# Patient Record
Sex: FEMALE | Race: WHITE | Employment: OTHER | URBAN - METROPOLITAN AREA
[De-identification: names, ages, dates, MRNs, and addresses within clinical notes are randomized per-mention and may not be internally consistent; named-entity substitution may affect disease eponyms.]

---

## 2017-01-02 DIAGNOSIS — I10 ESSENTIAL HYPERTENSION WITH GOAL BLOOD PRESSURE LESS THAN 130/80: ICD-10-CM

## 2017-01-03 RX ORDER — AMLODIPINE BESYLATE 5 MG/1
TABLET ORAL
Qty: 90 TAB | Refills: 0 | Status: SHIPPED | OUTPATIENT
Start: 2017-01-03 | End: 2017-04-01 | Stop reason: SDUPTHER

## 2017-03-14 ENCOUNTER — OFFICE VISIT (OUTPATIENT)
Dept: INTERNAL MEDICINE CLINIC | Age: 67
End: 2017-03-14

## 2017-03-14 VITALS
HEIGHT: 61 IN | TEMPERATURE: 98.4 F | DIASTOLIC BLOOD PRESSURE: 75 MMHG | OXYGEN SATURATION: 98 % | HEART RATE: 87 BPM | WEIGHT: 187 LBS | BODY MASS INDEX: 35.3 KG/M2 | RESPIRATION RATE: 18 BRPM | SYSTOLIC BLOOD PRESSURE: 149 MMHG

## 2017-03-14 DIAGNOSIS — R05.9 COUGH: ICD-10-CM

## 2017-03-14 DIAGNOSIS — M45.9 ANKYLOSING SPONDYLITIS (HCC): ICD-10-CM

## 2017-03-14 DIAGNOSIS — I10 ESSENTIAL HYPERTENSION, BENIGN: ICD-10-CM

## 2017-03-14 DIAGNOSIS — M17.11 PRIMARY OSTEOARTHRITIS OF RIGHT KNEE: Primary | ICD-10-CM

## 2017-03-14 DIAGNOSIS — K21.9 GASTROESOPHAGEAL REFLUX DISEASE, ESOPHAGITIS PRESENCE NOT SPECIFIED: ICD-10-CM

## 2017-03-14 DIAGNOSIS — Z01.818 PREOP GENERAL PHYSICAL EXAM: ICD-10-CM

## 2017-03-14 DIAGNOSIS — E11.9 CONTROLLED TYPE 2 DIABETES MELLITUS WITHOUT COMPLICATION, WITHOUT LONG-TERM CURRENT USE OF INSULIN (HCC): ICD-10-CM

## 2017-03-14 RX ORDER — RANITIDINE 150 MG/1
150 TABLET, FILM COATED ORAL 2 TIMES DAILY
Qty: 30 TAB | Refills: 0
Start: 2017-03-14 | End: 2020-04-07 | Stop reason: ALTCHOICE

## 2017-03-14 RX ORDER — BENZONATATE 200 MG/1
200 CAPSULE ORAL
Qty: 30 CAP | Refills: 0 | Status: SHIPPED | OUTPATIENT
Start: 2017-03-14 | End: 2017-03-21

## 2017-03-14 NOTE — PROGRESS NOTES
Have you been to the ER or urgent care clinic since your last visit? No     Have you been hospitalized since your last visit? No     Have you been seen or consulted any other health care provider outside of [address] [place] [address] since your last visit (including pap smears, colonoscopy screening)?   No

## 2017-03-14 NOTE — PROGRESS NOTES
HISTORY OF PRESENT ILLNESS  Reilly So is a 77 y.o. female. HPI  Reilly So was referred for evaluation by: Dr. Angelia Sandoval for Pre- Op Evaluation. Please see encounter details and orders for consultative summary. Type of surgery : Right total knee replacement  Surgery site : Right knee  Anesthesia type: Spinal with IV sedation  Date of procedure:  4/11/2017    Allergies: Allergies   Allergen Reactions    Latex Swelling    Benadryl [Diphenhydramine Hcl] Unknown (comments)     Raises heart rate    Atropine Swelling     Eye swelling    Compazine [Prochlorperazine Edisylate] Other (comments)     Muscle spasm    Cymbalta [Duloxetine] Vertigo    Diclofenac Sodium (Bulk) Rash    Phenobarbital Other (comments)     tachy     Latex allergy: yes  Prior reactions to anesthesia:  Has had early awakening with her cataract surgeries and Hysterectomy. Functional status:  she is able to ambulate up 1 flights of step without shortness of breath, chest pain  Prior cardiac evaluation:   Had cardiac workup for SOB 1/2016 with negative Lexiscan with EF of 65%. Has been taking Tums on a daily basis and has increased her usage up to 3 times per day which seems to keep her heartburn under control. Does not wish to take PPI due to side effect profile. Has history of Ankylosing spondylitis and is currently receiving Remicade infusions and takes Sulfasalazine. Her Iritis has been stable and she is not currently using steroid eye drops. Has started coughing over the past 2-3 days and has needed to use her Albuterol inhaler several times but denies purulent mucous or productive cough; denies fever, chills. Current Outpatient Prescriptions   Medication Sig    raNITIdine (ZANTAC) 150 mg tablet Take 1 Tab by mouth two (2) times a day.  benzonatate (TESSALON) 200 mg capsule Take 1 Cap by mouth three (3) times daily as needed for Cough for up to 7 days.     amLODIPine (NORVASC) 5 mg tablet TAKE 1 TABLET BY MOUTH DAILY    atorvastatin (LIPITOR) 20 mg tablet TAKE 1 TABLET BY MOUTH EVERY EVENING    metFORMIN ER (GLUCOPHAGE XR) 500 mg tablet TAKE 1 TABLET BY MOUTH EVERY DAY WITH DINNER    olmesartan-hydrochlorothiazide (BENICAR HCT) 40-25 mg per tablet Take 1 Tab by mouth daily.  montelukast (SINGULAIR) 10 mg tablet TAKE 1 TABLET BY MOUTH EVERY DAY    HYDROcodone-acetaminophen (NORCO) 5-325 mg per tablet Take 1 Tab by mouth every eight (8) hours as needed.  sulfaSALAzine (AZULFIDINE) 500 mg tablet Take 1,000 mg by mouth two (2) times a day.  albuterol (PROVENTIL HFA, VENTOLIN HFA, PROAIR HFA) 90 mcg/actuation inhaler Take 1 Puff by inhalation every six (6) hours as needed.  fluticasone (FLONASE) 50 mcg/actuation nasal spray 2 sprays by Both Nostrils route daily.  INFLIXIMAB (REMICADE IV) by IntraVENous route. Every 60 days    albuterol (PROVENTIL HFA, VENTOLIN HFA, PROAIR HFA) 90 mcg/actuation inhaler Take 1 Puff by inhalation every six (6) hours as needed for Wheezing for up to 30 days.  folic acid (FOLVITE) 1 mg tablet Take 2 mg by mouth daily. No current facility-administered medications for this visit.       Past Medical History:   Diagnosis Date    Ankylosing spondylitis ([facility] 2/8/2012    Autoimmune disease ([facility]     ankylosing spondylitis    Diabetes ([facility]     Essential hypertension, benign     First degree atrioventricular block     Hypercholesteremia     Iritis 1/15/2009    S/P partial hysterectomy     Sacroiliitis ([facility] 9/15/2010    Spinal stenosis, lumbar 5/29/14    mri tamiko at l4-l5    TMJ (dislocation of temporomandibular joint) 9/4/2009    Unspecified adverse effect of anesthesia     woke up durning  cataract and hysterectomy      Past Surgical History:   Procedure Laterality Date    ENDOSCOPY, COLON, DIAGNOSTIC  1/01/07    negative     HX CATARACT REMOVAL  1992    bilateral  / lens implant    HX GYN  9/28/13    bilat oophorectomy    HX HYSTERECTOMY  2008    HX KNEE ARTHROSCOPY  2003    right    HX ORTHOPAEDIC  8/6/15    LUMBAR LAMINECTOMY L3-4 dr Alissa Rogel     Social History   Substance Use Topics    Smoking status: Never Smoker    Smokeless tobacco: Never Used    Alcohol use Yes      Comment: occassional     Denies diagnosis of sleep apnea and does not use any CPAP device. Denies history of DVT or Pulmonary embolism; denies bleeding disorder. Blood pressure 149/75, pulse 87, temperature 98.4 °F (36.9 °C), temperature source Oral, resp. rate 18, height 5' 1\" (1.549 m), weight 187 lb (84.8 kg), SpO2 98 %. Review of Systems   Constitutional: Negative for chills, fever and malaise/fatigue. HENT: Positive for congestion. Negative for sore throat. Eyes: Positive for blurred vision. Respiratory: Positive for cough and wheezing. Negative for sputum production and shortness of breath. Cardiovascular: Negative for chest pain, palpitations and leg swelling. Gastrointestinal: Negative for abdominal pain, constipation, diarrhea, nausea and vomiting. Genitourinary: Negative for dysuria and frequency. Musculoskeletal: Positive for back pain and joint pain (right knee pain). Skin: Negative for rash. Neurological: Negative for dizziness, tingling and headaches. Endo/Heme/Allergies: Does not bruise/bleed easily. Psychiatric/Behavioral: Negative for depression. The patient is not nervous/anxious. Physical Exam   Constitutional: She is oriented to person, place, and time. She appears well-developed and well-nourished. HENT:   Head: Normocephalic and atraumatic. Right Ear: External ear normal.   Left Ear: External ear normal.   Nose: Nose normal.   Mouth/Throat: Oropharynx is clear and moist.   Eyes: Conjunctivae are normal.   Neck: Normal range of motion. Neck supple. No thyromegaly present. Cardiovascular: Normal rate, regular rhythm and normal heart sounds. Pulmonary/Chest: Effort normal. She has wheezes.  She has no rales.   Abdominal: Soft. Bowel sounds are normal. There is no tenderness. There is no rebound. Musculoskeletal:        Right knee: She exhibits decreased range of motion. Tenderness found. Medial joint line and lateral joint line tenderness noted. Lymphadenopathy:     She has no cervical adenopathy. Neurological: She is alert and oriented to person, place, and time. Skin: Skin is warm and dry. No rash noted. Psychiatric: She has a normal mood and affect. Her behavior is normal.   Nursing note and vitals reviewed. ASSESSMENT and Carly Bui was seen today for pre-op exam and wheezing. Diagnoses and all orders for this visit:    Primary osteoarthritis of right knee    Preop general physical exam -- she is scheduled for labs and EKG with Preadmission testing department at [facility] prior to surgery; provided all normal, she is considered acceptable risk for upcoming Right total knee replacement surgery with Spinal anesthesia and IV sedation. Ankylosing spondylitis ([facility] -- she has discussed stopping her infusions prior to knee replacement surgery with her Rheumatologist.    Gastroesophageal reflux disease, esophagitis presence not specified  -     raNITIdine (ZANTAC) 150 mg tablet; Take 1 Tab by mouth two (2) times a day. Essential hypertension, benign --stable    Cough -- no signs of bacterial infection noted at this time; advised to contact office if cough worsens or she develops purulent mucous  -     benzonatate (TESSALON) 200 mg capsule; Take 1 Cap by mouth three (3) times daily as needed for Cough for up to 7 days.     Diabetes Type 2, controlled -- has been stable on Metformin    lab results and schedule of future lab studies reviewed with patient  reviewed diet, exercise and weight control  reviewed medications and side effects in detail

## 2017-03-14 NOTE — MR AVS SNAPSHOT
Visit Information Date & Time Provider Department Dept. Phone Encounter #  
 3/14/2017 10:00 AM Cris Hernández NP [facility] [address] [id number] Upcoming Health Maintenance Date Due DTaP/Tdap/Td series (1 - Tdap) 12/20/1971 OSTEOPOROSIS SCREENING (DEXA) 12/20/2015 MEDICARE YEARLY EXAM 12/20/2015 BREAST CANCER SCRN MAMMOGRAM 6/4/2017 HEMOGLOBIN A1C Q6M 5/9/2017 FOOT EXAM Q1 5/25/2017 MICROALBUMIN Q1 8/23/2017 LIPID PANEL Q1 11/9/2017 EYE EXAM RETINAL OR DILATED Q1 2/7/2018 GLAUCOMA SCREENING Q2Y 2/7/2019 Pneumococcal 65+ Low/Medium Risk (2 of 2 - PPSV23) 8/6/2019 COLONOSCOPY 11/6/2019 Allergies as of 3/14/2017  Review Complete On: 3/14/2017 By: Cris Hernández NP Severity Noted Reaction Type Reactions Latex  01/15/2009    Swelling Benadryl [Diphenhydramine Hcl] Medium 03/07/2011   Systemic Unknown (comments) Raises heart rate Atropine  01/15/2009    Swelling Eye swelling Compazine [Prochlorperazine Edisylate]  01/15/2009    Other (comments) Muscle spasm Cymbalta [Duloxetine]  03/14/2017    Vertigo Diclofenac Sodium (Bulk)  11/13/2012    Rash Phenobarbital  01/15/2009    Other (comments)  
 tachy Current Immunizations  Reviewed on 3/14/2017 Name Date Influenza High Dose Vaccine PF 10/6/2016 Pneumococcal Conjugate (PCV-13) 5/25/2016 Pneumococcal Polysaccharide (PPSV-23) 8/6/2014 Pneumococcal Vaccine (Unspecified Type) 2/10/2011 10:59 AM  
 Varicella Virus Vaccine Live 2/1/2011 Zoster Vaccine, Live 1/1/2011 Reviewed by Cris Hernández NP on 3/14/2017 at 10:32 AM  
You Were Diagnosed With   
  
 Codes Comments Primary osteoarthritis of right knee    -  Primary ICD-10-CM: M17.11 ICD-9-CM: 715.16 Preop general physical exam     ICD-10-CM: U82.326 ICD-9-CM: V72.83 Ankylosing spondylitis (HCC)     ICD-10-CM: M45.9 ICD-9-CM: 720.0 Gastroesophageal reflux disease, esophagitis presence not specified     ICD-10-CM: K21.9 ICD-9-CM: 530.81 Essential hypertension, benign     ICD-10-CM: I10 
ICD-9-CM: 401.1 Cough     ICD-10-CM: R05 ICD-9-CM: 523. 2 Vitals BP Pulse Temp Resp Height(growth percentile) Weight(growth percentile) 149/75 (BP 1 Location: Left arm, BP Patient Position: Sitting) 87 98.4 °F (36.9 °C) (Oral) 18 5' 1\" (1.549 m) 187 lb (84.8 kg) SpO2 BMI OB Status Smoking Status 98% 35.33 kg/m2 Hysterectomy Never Smoker BMI and BSA Data Body Mass Index Body Surface Area  
 35.33 kg/m 2 1.91 m 2 Preferred Pharmacy Pharmacy Name Phone [facility] [address] [facility], [address] [place] [address] AT [address] [place] [address], [facility] [phone] Your Updated Medication List  
  
   
This list is accurate as of: 3/14/17 10:42 AM.  Always use your most recent med list.  
  
  
  
  
 * albuterol 90 mcg/actuation inhaler Commonly known as:  PROVENTIL HFA, VENTOLIN HFA, PROAIR HFA Take 1 Puff by inhalation every six (6) hours as needed. * albuterol 90 mcg/actuation inhaler Commonly known as:  PROVENTIL HFA, VENTOLIN HFA, PROAIR HFA Take 1 Puff by inhalation every six (6) hours as needed for Wheezing for up to 30 days. amLODIPine 5 mg tablet Commonly known as:  Mao Lasha TAKE 1 TABLET BY MOUTH DAILY  
  
 atorvastatin 20 mg tablet Commonly known as:  LIPITOR  
TAKE 1 TABLET BY MOUTH EVERY EVENING  
  
 benzonatate 200 mg capsule Commonly known as:  TESSALON Take 1 Cap by mouth three (3) times daily as needed for Cough for up to 7 days. fluticasone 50 mcg/actuation nasal spray Commonly known as:  FLONASE  
2 sprays by Both Nostrils route daily. folic acid 1 mg tablet Commonly known as:  Google Take 2 mg by mouth daily. HYDROcodone-acetaminophen 5-325 mg per tablet Commonly known as:  Lawrence Christine  
 Take 1 Tab by mouth every eight (8) hours as needed. metFORMIN  mg tablet Commonly known as:  GLUCOPHAGE XR  
TAKE 1 TABLET BY MOUTH EVERY DAY WITH DINNER  
  
 montelukast 10 mg tablet Commonly known as:  SINGULAIR  
TAKE 1 TABLET BY MOUTH EVERY DAY  
  
 olmesartan-hydroCHLOROthiazide 40-25 mg per tablet Commonly known as:  BENICAR HCT Take 1 Tab by mouth daily. raNITIdine 150 mg tablet Commonly known as:  ZANTAC Take 1 Tab by mouth two (2) times a day. REMICADE IV  
by IntraVENous route. Every 60 days  
  
 sulfaSALAzine 500 mg tablet Commonly known as:  AZULFIDINE Take 1,000 mg by mouth two (2) times a day. * Notice: This list has 2 medication(s) that are the same as other medications prescribed for you. Read the directions carefully, and ask your doctor or other care provider to review them with you. Prescriptions Sent to Pharmacy Refills  
 benzonatate (TESSALON) 200 mg capsule 0 Sig: Take 1 Cap by mouth three (3) times daily as needed for Cough for up to 7 days. Class: Normal  
 [facility] [address] [place] [address], [address] [place] [address] of [facility] Ph #: [phone] Route: Oral  
  
To-Do List   
 03/27/2017 8:00 AM  
  Appointment with [facility] PAT EXAM RM 3 at [place] [address] ([phone])  
  
 03/27/2017 9:30 AM  
  Appointment with [facility] PAT CLASSROOM at [place] [address] ([phone]) Patient Instructions Gastroesophageal Reflux Disease (GERD): Care Instructions Your Care Instructions Gastroesophageal reflux disease (GERD) is the backward flow of stomach acid into the esophagus. The esophagus is the tube that leads from your throat to your stomach. A one-way valve prevents the stomach acid from moving up into this tube. When you have GERD, this valve does not close tightly enough.  
If you have mild GERD symptoms including heartburn, you may be able to control the problem with antacids or over-the-counter medicine. Changing your diet, losing weight, and making other lifestyle changes can also help reduce symptoms. Follow-up care is a key part of your treatment and safety. Be sure to make and go to all appointments, and call your doctor if you are having problems. Its also a good idea to know your test results and keep a list of the medicines you take. How can you care for yourself at home? · Take your medicines exactly as prescribed. Call your doctor if you think you are having a problem with your medicine. · Your doctor may recommend over-the-counter medicine. For mild or occasional indigestion, antacids, such as Tums, Gaviscon, Mylanta, or Maalox, may help. Your doctor also may recommend over-the-counter acid reducers, such as Pepcid AC, Tagamet HB, Zantac 75, or Prilosec. Read and follow all instructions on the label. If you use these medicines often, talk with your doctor. · Change your eating habits. ¨ Its best to eat several small meals instead of two or three large meals. ¨ After you eat, wait 2 to 3 hours before you lie down. ¨ Chocolate, mint, and alcohol can make GERD worse. ¨ Spicy foods, foods that have a lot of acid (like tomatoes and oranges), and coffee can make GERD symptoms worse in some people. If your symptoms are worse after you eat a certain food, you may want to stop eating that food to see if your symptoms get better. · Do not smoke or chew tobacco. Smoking can make GERD worse. If you need help quitting, talk to your doctor about stop-smoking programs and medicines. These can increase your chances of quitting for good. · If you have GERD symptoms at night, raise the head of your bed 6 to 8 inches by putting the frame on blocks or placing a foam wedge under the head of your mattress. (Adding extra pillows does not work.) · Do not wear tight clothing around your middle. · Lose weight if you need to. Losing just 5 to 10 pounds can help. When should you call for help? Call your doctor now or seek immediate medical care if: 
· You have new or different belly pain. · Your stools are black and tarlike or have streaks of blood. Watch closely for changes in your health, and be sure to contact your doctor if: 
· Your symptoms have not improved after 2 days. · Food seems to catch in your throat or chest. 
Where can you learn more? Go to http://zara-nelly.info/. Enter Y382 in the search box to learn more about \"Gastroesophageal Reflux Disease (GERD): Care Instructions. \" Current as of: August 9, 2016 Content Version: 11.1 © 7603-8284 [employer]. Care instructions adapted under license by [employer] video (which disclaims liability or warranty for this information). If you have questions about a medical condition or this instruction, always ask your healthcare professional. [place] [address] any warranty or liability for your use of this information. Total Knee Replacement: Before Your Surgery What is a total knee replacement? A total knee replacement replaces the worn ends of the bones where they meet at the knee. Those bones are the thighbone (femur) and the lower leg bone (tibia). Your doctor will remove the damaged bone. Then he or she will replace it with plastic and metal parts. These new parts may be attached to your bones with cement. Your doctor will make a cut down the center of your knee. This cut is called an incision. It will be about 8 to 10 inches long. Sometimes the surgery can be done with a smaller incision that is 4 to 6 inches. Both kinds of incisions leave scars that usually fade with time. You probably will be able to go home about 3 to 5 days after surgery.  If you have both knees done at the same time, you may need to be in the hospital for 1 week. If there is no one to help you at home, you may go to a rehab center. Most people go back to normal activities or work in 4 to 16 weeks. This depends on your health. It also depends on how well your knee does in your rehab program. This may take longer if you have both knees done at the same time. Follow-up care is a key part of your treatment and safety. Be sure to make and go to all appointments, and call your doctor if you are having problems. It's also a good idea to know your test results and keep a list of the medicines you take. What happens before surgery? Surgery can be stressful. This information will help you understand what you can expect. And it will help you safely prepare for surgery. Preparing for surgery · Understand exactly what surgery is planned, along with the risks, benefits, and other options. · Tell your doctors ALL the medicines, vitamins, supplements, and herbal remedies you take. Some of these can increase the risk of bleeding or interact with anesthesia. · If you take blood thinners, such as warfarin (Coumadin), clopidogrel (Plavix), or aspirin, be sure to talk to your doctor. He or she will tell you if you should stop taking these medicines before your surgery. Make sure that you understand exactly what your doctor wants you to do. · Your doctor will tell you which medicines to take or stop before your surgery. You may need to stop taking certain medicines a week or more before surgery. So talk to your doctor as soon as you can. · If you have an advance directive, let your doctor know. It may include a living will and a durable power of  for health care. Bring a copy to the hospital. If you don't have one, you may want to prepare one. It lets your doctor and loved ones know your health care wishes. Doctors advise that everyone prepare these papers before any type of surgery or procedure. · You may need to shower or bathe with a special soap the night before and the morning of your surgery. The soap contains chlorhexidine. It reduces the amount of bacteria on your skin that could cause an infection after surgery. What happens on the day of surgery? · Follow the instructions exactly about when to stop eating and drinking. If you don't, your surgery may be canceled. If your doctor told you to take your medicines on the day of surgery, take them with only a sip of water. · Take a bath or shower before you come in for your surgery. Do not apply lotions, perfumes, deodorants, or nail polish. · Do not shave the surgical site yourself. · Take off all jewelry and piercings. And take out contact lenses, if you wear them. At the hospital or surgery center · Bring a picture ID. · The area for surgery is often marked to make sure there are no errors. · You will be kept comfortable and safe by your anesthesia provider. The anesthesia may make you sleep. Or it may just numb the area being worked on. 
· You also will get antibiotics through the IV tube before surgery. This lowers the risk of an infection of the incision. · The surgery will take about 2 to 3 hours. Going home · Be sure you have someone to drive you home. Anesthesia and pain medicine make it unsafe for you to drive. · You will be given more specific instructions about recovering from your surgery. They will cover things like diet, wound care, follow-up care, driving, and getting back to your normal routine. When should you call your doctor? · You have questions or concerns. · You don't understand how to prepare for your surgery. · You become ill before the surgery (such as fever, flu, or a cold). · You need to reschedule or have changed your mind about having the surgery. Where can you learn more? Go to http://zara-nelly.info/.  
Enter J519 in the search box to learn more about \"Total Knee Replacement: Before Your Surgery. \" Current as of: May 23, 2016 Content Version: 11.1 © 1011-0459 [employer]. Care instructions adapted under license by [employer] (which disclaims liability or warranty for this information). If you have questions about a medical condition or this instruction, always ask your healthcare professional. [address] any warranty or liability for your use of this information. Introducing [facility]! Dear Livia Timmons: 
Thank you for requesting a [employer] account. Our records indicate that you already have an active [employer] account. You can access your account anytime at [email or web address] Did you know that you can access your hospital and ER discharge instructions at any time in [employer] You can also review all of your test results from your hospital stay or ER visit. Additional Information If you have questions, please visit the Frequently Asked Questions section of the [employer] website at [email or web address] Remember, [employer] is NOT to be used for urgent needs. For medical emergencies, dial 911. Now available from your iPhone and Android! Please provide this summary of care documentation to your next provider. Your primary care clinician is listed as Chava Davis. If you have any questions after today's visit, please call [phone].

## 2017-03-14 NOTE — PATIENT INSTRUCTIONS
Gastroesophageal Reflux Disease (GERD): Care Instructions  Your Care Instructions    Gastroesophageal reflux disease (GERD) is the backward flow of stomach acid into the esophagus. The esophagus is the tube that leads from your throat to your stomach. A one-way valve prevents the stomach acid from moving up into this tube. When you have GERD, this valve does not close tightly enough. If you have mild GERD symptoms including heartburn, you may be able to control the problem with antacids or over-the-counter medicine. Changing your diet, losing weight, and making other lifestyle changes can also help reduce symptoms. Follow-up care is a key part of your treatment and safety. Be sure to make and go to all appointments, and call your doctor if you are having problems. Its also a good idea to know your test results and keep a list of the medicines you take. How can you care for yourself at home? · Take your medicines exactly as prescribed. Call your doctor if you think you are having a problem with your medicine. · Your doctor may recommend over-the-counter medicine. For mild or occasional indigestion, antacids, such as Tums, Gaviscon, Mylanta, or Maalox, may help. Your doctor also may recommend over-the-counter acid reducers, such as Pepcid AC, Tagamet HB, Zantac 75, or Prilosec. Read and follow all instructions on the label. If you use these medicines often, talk with your doctor. · Change your eating habits. ¨ Its best to eat several small meals instead of two or three large meals. ¨ After you eat, wait 2 to 3 hours before you lie down. ¨ Chocolate, mint, and alcohol can make GERD worse. ¨ Spicy foods, foods that have a lot of acid (like tomatoes and oranges), and coffee can make GERD symptoms worse in some people. If your symptoms are worse after you eat a certain food, you may want to stop eating that food to see if your symptoms get better.   · Do not smoke or chew tobacco. Smoking can make GERD worse. If you need help quitting, talk to your doctor about stop-smoking programs and medicines. These can increase your chances of quitting for good. · If you have GERD symptoms at night, raise the head of your bed 6 to 8 inches by putting the frame on blocks or placing a foam wedge under the head of your mattress. (Adding extra pillows does not work.)  · Do not wear tight clothing around your middle. · Lose weight if you need to. Losing just 5 to 10 pounds can help. When should you call for help? Call your doctor now or seek immediate medical care if:  · You have new or different belly pain. · Your stools are black and tarlike or have streaks of blood. Watch closely for changes in your health, and be sure to contact your doctor if:  · Your symptoms have not improved after 2 days. · Food seems to catch in your throat or chest.  Where can you learn more? Go to http://zaraigobubblenelly.info/. Enter Y111 in the search box to learn more about \"Gastroesophageal Reflux Disease (GERD): Care Instructions. \"  Current as of: August 9, 2016  Content Version: 11.1  © 9106-1678 [employer]. Care instructions adapted under license by [employer] (which disclaims liability or warranty for this information). If you have questions about a medical condition or this instruction, always ask your healthcare professional. [address] any warranty or liability for your use of this information. Total Knee Replacement: Before Your Surgery  What is a total knee replacement? A total knee replacement replaces the worn ends of the bones where they meet at the knee. Those bones are the thighbone (femur) and the lower leg bone (tibia). Your doctor will remove the damaged bone. Then he or she will replace it with plastic and metal parts. These new parts may be attached to your bones with cement. Your doctor will make a cut down the center of your knee.  This cut is called an incision. It will be about 8 to 10 inches long. Sometimes the surgery can be done with a smaller incision that is 4 to 6 inches. Both kinds of incisions leave scars that usually fade with time. You probably will be able to go home about 3 to 5 days after surgery. If you have both knees done at the same time, you may need to be in the hospital for 1 week. If there is no one to help you at home, you may go to a rehab center. Most people go back to normal activities or work in 4 to 16 weeks. This depends on your health. It also depends on how well your knee does in your rehab program. This may take longer if you have both knees done at the same time. Follow-up care is a key part of your treatment and safety. Be sure to make and go to all appointments, and call your doctor if you are having problems. It's also a good idea to know your test results and keep a list of the medicines you take. What happens before surgery? Surgery can be stressful. This information will help you understand what you can expect. And it will help you safely prepare for surgery. Preparing for surgery  · Understand exactly what surgery is planned, along with the risks, benefits, and other options. · Tell your doctors ALL the medicines, vitamins, supplements, and herbal remedies you take. Some of these can increase the risk of bleeding or interact with anesthesia. · If you take blood thinners, such as warfarin (Coumadin), clopidogrel (Plavix), or aspirin, be sure to talk to your doctor. He or she will tell you if you should stop taking these medicines before your surgery. Make sure that you understand exactly what your doctor wants you to do. · Your doctor will tell you which medicines to take or stop before your surgery. You may need to stop taking certain medicines a week or more before surgery. So talk to your doctor as soon as you can. · If you have an advance directive, let your doctor know.  It may include a living will and a durable power of  for health care. Bring a copy to the hospital. If you don't have one, you may want to prepare one. It lets your doctor and loved ones know your health care wishes. Doctors advise that everyone prepare these papers before any type of surgery or procedure. · You may need to shower or bathe with a special soap the night before and the morning of your surgery. The soap contains chlorhexidine. It reduces the amount of bacteria on your skin that could cause an infection after surgery. What happens on the day of surgery? · Follow the instructions exactly about when to stop eating and drinking. If you don't, your surgery may be canceled. If your doctor told you to take your medicines on the day of surgery, take them with only a sip of water. · Take a bath or shower before you come in for your surgery. Do not apply lotions, perfumes, deodorants, or nail polish. · Do not shave the surgical site yourself. · Take off all jewelry and piercings. And take out contact lenses, if you wear them. At the hospital or surgery center  · Bring a picture ID. · The area for surgery is often marked to make sure there are no errors. · You will be kept comfortable and safe by your anesthesia provider. The anesthesia may make you sleep. Or it may just numb the area being worked on.  · You also will get antibiotics through the IV tube before surgery. This lowers the risk of an infection of the incision. · The surgery will take about 2 to 3 hours. Going home  · Be sure you have someone to drive you home. Anesthesia and pain medicine make it unsafe for you to drive. · You will be given more specific instructions about recovering from your surgery. They will cover things like diet, wound care, follow-up care, driving, and getting back to your normal routine. When should you call your doctor? · You have questions or concerns. · You don't understand how to prepare for your surgery.   · You become ill before the surgery (such as fever, flu, or a cold). · You need to reschedule or have changed your mind about having the surgery. Where can you learn more? Go to http://zara-nelly.info/. Enter W285 in the search box to learn more about \"Total Knee Replacement: Before Your Surgery. \"  Current as of: May 23, 2016  Content Version: 11.1  © 5237-4963 [employer]. Care instructions adapted under license by [email or web address] (which disclaims liability or warranty for this information). If you have questions about a medical condition or this instruction, always ask your healthcare professional. [address] any warranty or liability for your use of this information.

## 2017-03-27 ENCOUNTER — HOSPITAL ENCOUNTER (OUTPATIENT)
Dept: PREADMISSION TESTING | Age: 67
Discharge: HOME | End: 2017-03-27
Payer: MEDICARE

## 2017-03-27 ENCOUNTER — APPOINTMENT (OUTPATIENT)
Dept: PREADMISSION TESTING | Age: 67
End: 2017-03-27
Payer: MEDICARE

## 2017-03-27 VITALS
BODY MASS INDEX: 34.41 KG/M2 | HEIGHT: 62 IN | HEART RATE: 84 BPM | OXYGEN SATURATION: 96 % | TEMPERATURE: 98.4 F | WEIGHT: 187 LBS | SYSTOLIC BLOOD PRESSURE: 136 MMHG | DIASTOLIC BLOOD PRESSURE: 80 MMHG

## 2017-03-27 LAB
ABO + RH BLD: NORMAL
ANION GAP BLD CALC-SCNC: 7 MMOL/L (ref 5–15)
APPEARANCE UR: CLEAR
ATRIAL RATE: 83 BPM
BACTERIA URNS QL MICRO: NEGATIVE /HPF
BILIRUB UR QL: NEGATIVE
BLOOD GROUP ANTIBODIES SERPL: NORMAL
BUN SERPL-MCNC: 15 MG/DL (ref 6–20)
BUN/CREAT SERPL: 19 (ref 12–20)
CALCIUM SERPL-MCNC: 9.3 MG/DL (ref 8.5–10.1)
CALCULATED P AXIS, ECG09: 45 DEGREES
CALCULATED R AXIS, ECG10: 8 DEGREES
CALCULATED T AXIS, ECG11: 8 DEGREES
CHLORIDE SERPL-SCNC: 101 MMOL/L (ref 97–108)
CO2 SERPL-SCNC: 30 MMOL/L (ref 21–32)
COLOR UR: ABNORMAL
CREAT SERPL-MCNC: 0.78 MG/DL (ref 0.55–1.02)
DIAGNOSIS, 93000: NORMAL
EPITH CASTS URNS QL MICRO: ABNORMAL /LPF
ERYTHROCYTE [DISTWIDTH] IN BLOOD BY AUTOMATED COUNT: 12.5 % (ref 11.5–14.5)
EST. AVERAGE GLUCOSE BLD GHB EST-MCNC: 146 MG/DL
GLUCOSE SERPL-MCNC: 176 MG/DL (ref 65–100)
GLUCOSE UR STRIP.AUTO-MCNC: 250 MG/DL
HBA1C MFR BLD: 6.7 % (ref 4.2–6.3)
HCT VFR BLD AUTO: 39.7 % (ref 35–47)
HGB BLD-MCNC: 13.6 G/DL (ref 11.5–16)
HGB UR QL STRIP: NEGATIVE
HYALINE CASTS URNS QL MICRO: ABNORMAL /LPF (ref 0–5)
INR PPP: 1 (ref 0.9–1.1)
KETONES UR QL STRIP.AUTO: NEGATIVE MG/DL
LEUKOCYTE ESTERASE UR QL STRIP.AUTO: NEGATIVE
MCH RBC QN AUTO: 32 PG (ref 26–34)
MCHC RBC AUTO-ENTMCNC: 34.3 G/DL (ref 30–36.5)
MCV RBC AUTO: 93.4 FL (ref 80–99)
NITRITE UR QL STRIP.AUTO: NEGATIVE
P-R INTERVAL, ECG05: 180 MS
PH UR STRIP: 5.5 [PH] (ref 5–8)
PLATELET # BLD AUTO: 247 K/UL (ref 150–400)
POTASSIUM SERPL-SCNC: 3.7 MMOL/L (ref 3.5–5.1)
PROT UR STRIP-MCNC: NEGATIVE MG/DL
PROTHROMBIN TIME: 10.7 SEC (ref 9–11.1)
Q-T INTERVAL, ECG07: 422 MS
QRS DURATION, ECG06: 96 MS
QTC CALCULATION (BEZET), ECG08: 495 MS
RBC # BLD AUTO: 4.25 M/UL (ref 3.8–5.2)
RBC #/AREA URNS HPF: ABNORMAL /HPF (ref 0–5)
SODIUM SERPL-SCNC: 138 MMOL/L (ref 136–145)
SP GR UR REFRACTOMETRY: 1.02 (ref 1–1.03)
SPECIMEN EXP DATE BLD: NORMAL
UA: UC IF INDICATED,UAUC: ABNORMAL
UROBILINOGEN UR QL STRIP.AUTO: 0.2 EU/DL (ref 0.2–1)
VENTRICULAR RATE, ECG03: 83 BPM
WBC # BLD AUTO: 7.4 K/UL (ref 3.6–11)
WBC URNS QL MICRO: ABNORMAL /HPF (ref 0–4)

## 2017-03-27 PROCEDURE — 85610 PROTHROMBIN TIME: CPT | Performed by: ORTHOPAEDIC SURGERY

## 2017-03-27 PROCEDURE — 93005 ELECTROCARDIOGRAM TRACING: CPT

## 2017-03-27 PROCEDURE — 86900 BLOOD TYPING SEROLOGIC ABO: CPT | Performed by: ORTHOPAEDIC SURGERY

## 2017-03-27 PROCEDURE — 36415 COLL VENOUS BLD VENIPUNCTURE: CPT | Performed by: ORTHOPAEDIC SURGERY

## 2017-03-27 PROCEDURE — 85027 COMPLETE CBC AUTOMATED: CPT | Performed by: ORTHOPAEDIC SURGERY

## 2017-03-27 PROCEDURE — 83036 HEMOGLOBIN GLYCOSYLATED A1C: CPT | Performed by: ORTHOPAEDIC SURGERY

## 2017-03-27 PROCEDURE — 81001 URINALYSIS AUTO W/SCOPE: CPT | Performed by: ORTHOPAEDIC SURGERY

## 2017-03-27 PROCEDURE — 80048 BASIC METABOLIC PNL TOTAL CA: CPT | Performed by: ORTHOPAEDIC SURGERY

## 2017-03-27 RX ORDER — MELATONIN 5 MG
5 CAPSULE ORAL
COMMUNITY

## 2017-03-27 RX ORDER — PREDNISOLONE ACETATE 10 MG/ML
1 SUSPENSION/ DROPS OPHTHALMIC AS NEEDED
COMMUNITY
End: 2019-07-12

## 2017-03-27 NOTE — PERIOP NOTES
Preoperative instructions reviewed with patient. Patient given six pack of CHG wipes. Instructions to be reviewed  in class on use of CHG wipes. Patient given SSI infection sheet and hand washing tips sheets. MRSA/MSSA treatment instruction sheet given with an explanation to patient that they  will be notified if treatment instructions need to be initiated. Patient was given the opportunity to ask questions on the information provided.

## 2017-03-28 LAB
BACTERIA SPEC CULT: NORMAL
BACTERIA SPEC CULT: NORMAL
SERVICE CMNT-IMP: NORMAL

## 2017-04-01 DIAGNOSIS — I10 ESSENTIAL HYPERTENSION WITH GOAL BLOOD PRESSURE LESS THAN 130/80: ICD-10-CM

## 2017-04-02 RX ORDER — AMLODIPINE BESYLATE 5 MG/1
TABLET ORAL
Qty: 90 TAB | Refills: 0 | Status: SHIPPED | OUTPATIENT
Start: 2017-04-02 | End: 2017-05-12 | Stop reason: SDUPTHER

## 2017-04-07 RX ORDER — DEXAMETHASONE SODIUM PHOSPHATE 100 MG/10ML
10 INJECTION INTRAMUSCULAR; INTRAVENOUS ONCE
Status: CANCELLED | OUTPATIENT
Start: 2017-04-07 | End: 2017-04-07

## 2017-04-07 RX ORDER — CELECOXIB 200 MG/1
200 CAPSULE ORAL ONCE
Status: CANCELLED | OUTPATIENT
Start: 2017-04-07 | End: 2017-04-07

## 2017-04-07 RX ORDER — CEFAZOLIN SODIUM IN 0.9 % NACL 2 G/50 ML
2 INTRAVENOUS SOLUTION, PIGGYBACK (ML) INTRAVENOUS ONCE
Status: CANCELLED | OUTPATIENT
Start: 2017-04-07 | End: 2017-04-07

## 2017-04-07 RX ORDER — ACETAMINOPHEN 500 MG
1000 TABLET ORAL ONCE
Status: CANCELLED | OUTPATIENT
Start: 2017-04-07 | End: 2017-04-07

## 2017-04-07 RX ORDER — PREGABALIN 75 MG/1
75 CAPSULE ORAL ONCE
Status: CANCELLED | OUTPATIENT
Start: 2017-04-07 | End: 2017-04-07

## 2017-04-10 ENCOUNTER — ANESTHESIA EVENT (OUTPATIENT)
Dept: SURGERY | Age: 67
DRG: 470 | End: 2017-04-10
Payer: MEDICARE

## 2017-04-10 RX ORDER — METFORMIN HYDROCHLORIDE 500 MG/1
TABLET, EXTENDED RELEASE ORAL
Qty: 30 TAB | Refills: 6 | Status: SHIPPED | OUTPATIENT
Start: 2017-04-10 | End: 2017-10-28 | Stop reason: SDUPTHER

## 2017-04-11 ENCOUNTER — ANESTHESIA (OUTPATIENT)
Dept: SURGERY | Age: 67
DRG: 470 | End: 2017-04-11
Payer: MEDICARE

## 2017-04-11 ENCOUNTER — HOSPITAL ENCOUNTER (INPATIENT)
Age: 67
LOS: 2 days | Discharge: HOME HEALTH | DRG: 470 | End: 2017-04-13
Attending: ORTHOPAEDIC SURGERY | Admitting: ORTHOPAEDIC SURGERY
Payer: MEDICARE

## 2017-04-11 PROBLEM — M17.11 PRIMARY OSTEOARTHRITIS OF RIGHT KNEE: Status: ACTIVE | Noted: 2017-04-11

## 2017-04-11 LAB
ABO + RH BLD: NORMAL
BLOOD GROUP ANTIBODIES SERPL: NORMAL
GLUCOSE BLD STRIP.AUTO-MCNC: 133 MG/DL (ref 65–100)
GLUCOSE BLD STRIP.AUTO-MCNC: 149 MG/DL (ref 65–100)
GLUCOSE BLD STRIP.AUTO-MCNC: 283 MG/DL (ref 65–100)
SERVICE CMNT-IMP: ABNORMAL
SPECIMEN EXP DATE BLD: NORMAL

## 2017-04-11 PROCEDURE — 77030033067 HC SUT PDO STRATFX SPIR J&J -B: Performed by: ORTHOPAEDIC SURGERY

## 2017-04-11 PROCEDURE — 77030014077 HC TOWER MX CEM J&J -C: Performed by: ORTHOPAEDIC SURGERY

## 2017-04-11 PROCEDURE — 77030020365 HC SOL INJ SOD CL 0.9% 50ML: Performed by: ORTHOPAEDIC SURGERY

## 2017-04-11 PROCEDURE — 77030000032 HC CUF TRNQT ZIMM -B: Performed by: ORTHOPAEDIC SURGERY

## 2017-04-11 PROCEDURE — 82962 GLUCOSE BLOOD TEST: CPT

## 2017-04-11 PROCEDURE — 74011250637 HC RX REV CODE- 250/637: Performed by: ORTHOPAEDIC SURGERY

## 2017-04-11 PROCEDURE — 74011250636 HC RX REV CODE- 250/636: Performed by: ORTHOPAEDIC SURGERY

## 2017-04-11 PROCEDURE — 77030011640 HC PAD GRND REM COVD -A: Performed by: ORTHOPAEDIC SURGERY

## 2017-04-11 PROCEDURE — 77030020788: Performed by: ORTHOPAEDIC SURGERY

## 2017-04-11 PROCEDURE — 74011000250 HC RX REV CODE- 250: Performed by: ANESTHESIOLOGY

## 2017-04-11 PROCEDURE — 77030031139 HC SUT VCRL2 J&J -A: Performed by: ORTHOPAEDIC SURGERY

## 2017-04-11 PROCEDURE — 76010000172 HC OR TIME 2.5 TO 3 HR INTENSV-TIER 1: Performed by: ORTHOPAEDIC SURGERY

## 2017-04-11 PROCEDURE — 86900 BLOOD TYPING SEROLOGIC ABO: CPT | Performed by: ORTHOPAEDIC SURGERY

## 2017-04-11 PROCEDURE — C1713 ANCHOR/SCREW BN/BN,TIS/BN: HCPCS | Performed by: ORTHOPAEDIC SURGERY

## 2017-04-11 PROCEDURE — 77030007866 HC KT SPN ANES BBMI -B: Performed by: NURSE ANESTHETIST, CERTIFIED REGISTERED

## 2017-04-11 PROCEDURE — 77030012935 HC DRSG AQUACEL BMS -B: Performed by: ORTHOPAEDIC SURGERY

## 2017-04-11 PROCEDURE — 64450 NJX AA&/STRD OTHER PN/BRANCH: CPT

## 2017-04-11 PROCEDURE — 77030013079 HC BLNKT BAIR HGGR 3M -A: Performed by: NURSE ANESTHETIST, CERTIFIED REGISTERED

## 2017-04-11 PROCEDURE — 3E0T3CZ INTRODUCE REGIONAL ANESTH IN PERIPH NRV, PLEXI, PERC: ICD-10-PCS | Performed by: ANESTHESIOLOGY

## 2017-04-11 PROCEDURE — 0SRC0J9 REPLACEMENT OF RIGHT KNEE JOINT WITH SYNTHETIC SUBSTITUTE, CEMENTED, OPEN APPROACH: ICD-10-PCS | Performed by: ORTHOPAEDIC SURGERY

## 2017-04-11 PROCEDURE — 36415 COLL VENOUS BLD VENIPUNCTURE: CPT | Performed by: ORTHOPAEDIC SURGERY

## 2017-04-11 PROCEDURE — 74011000258 HC RX REV CODE- 258: Performed by: ORTHOPAEDIC SURGERY

## 2017-04-11 PROCEDURE — C9290 INJ, BUPIVACAINE LIPOSOME: HCPCS | Performed by: ORTHOPAEDIC SURGERY

## 2017-04-11 PROCEDURE — 74011250636 HC RX REV CODE- 250/636: Performed by: ANESTHESIOLOGY

## 2017-04-11 PROCEDURE — 77030003601 HC NDL NRV BLK BBMI -A

## 2017-04-11 PROCEDURE — 77030018836 HC SOL IRR NACL ICUM -A: Performed by: ORTHOPAEDIC SURGERY

## 2017-04-11 PROCEDURE — 76210000006 HC OR PH I REC 0.5 TO 1 HR: Performed by: ORTHOPAEDIC SURGERY

## 2017-04-11 PROCEDURE — 77030018846 HC SOL IRR STRL H20 ICUM -A: Performed by: ORTHOPAEDIC SURGERY

## 2017-04-11 PROCEDURE — 74011000250 HC RX REV CODE- 250

## 2017-04-11 PROCEDURE — 74011250636 HC RX REV CODE- 250/636

## 2017-04-11 PROCEDURE — 77030016547 HC BLD SAW SAG1 STRY -B: Performed by: ORTHOPAEDIC SURGERY

## 2017-04-11 PROCEDURE — 74011000258 HC RX REV CODE- 258

## 2017-04-11 PROCEDURE — 77030018822 HC SLV COMPR FT COVD -B

## 2017-04-11 PROCEDURE — 74011000250 HC RX REV CODE- 250: Performed by: ORTHOPAEDIC SURGERY

## 2017-04-11 PROCEDURE — 77030005515 HC CATH URETH FOL14 BARD -B: Performed by: ORTHOPAEDIC SURGERY

## 2017-04-11 PROCEDURE — 65270000029 HC RM PRIVATE

## 2017-04-11 PROCEDURE — 76060000036 HC ANESTHESIA 2.5 TO 3 HR: Performed by: ORTHOPAEDIC SURGERY

## 2017-04-11 PROCEDURE — 77030002933 HC SUT MCRYL J&J -A: Performed by: ORTHOPAEDIC SURGERY

## 2017-04-11 PROCEDURE — C1776 JOINT DEVICE (IMPLANTABLE): HCPCS | Performed by: ORTHOPAEDIC SURGERY

## 2017-04-11 PROCEDURE — 77030010507 HC ADH SKN DERMBND J&J -B: Performed by: ORTHOPAEDIC SURGERY

## 2017-04-11 DEVICE — COMPONENT PAT DIA35MM KNEE POLY DOME CEM MEDIALIZED ATTUNE: Type: IMPLANTABLE DEVICE | Site: KNEE | Status: FUNCTIONAL

## 2017-04-11 DEVICE — BASEPLATE TIB SZ 3 KNEE CEM FIX BEAR ATTUNE: Type: IMPLANTABLE DEVICE | Site: KNEE | Status: FUNCTIONAL

## 2017-04-11 DEVICE — INSERT TIB SZ 5 THK7MM KNEE POST STBL FIX BEAR ATTUNE: Type: IMPLANTABLE DEVICE | Site: KNEE | Status: FUNCTIONAL

## 2017-04-11 DEVICE — COMPONENT FEM SZ 5 R KNEE POST STBL CEM ATTUNE: Type: IMPLANTABLE DEVICE | Site: KNEE | Status: FUNCTIONAL

## 2017-04-11 DEVICE — INSERT TIB RP FEM KNEE CEM: Type: IMPLANTABLE DEVICE | Site: KNEE | Status: FUNCTIONAL

## 2017-04-11 DEVICE — CEMENT BNE GENTAMICIN 40 GM SMARTSET GMV: Type: IMPLANTABLE DEVICE | Site: KNEE | Status: FUNCTIONAL

## 2017-04-11 RX ORDER — MIDAZOLAM HYDROCHLORIDE 1 MG/ML
1 INJECTION, SOLUTION INTRAMUSCULAR; INTRAVENOUS AS NEEDED
Status: DISCONTINUED | OUTPATIENT
Start: 2017-04-11 | End: 2017-04-11 | Stop reason: HOSPADM

## 2017-04-11 RX ORDER — NALOXONE HYDROCHLORIDE 0.4 MG/ML
0.4 INJECTION, SOLUTION INTRAMUSCULAR; INTRAVENOUS; SUBCUTANEOUS AS NEEDED
Status: DISCONTINUED | OUTPATIENT
Start: 2017-04-11 | End: 2017-04-13 | Stop reason: HOSPADM

## 2017-04-11 RX ORDER — SODIUM CHLORIDE, SODIUM LACTATE, POTASSIUM CHLORIDE, CALCIUM CHLORIDE 600; 310; 30; 20 MG/100ML; MG/100ML; MG/100ML; MG/100ML
125 INJECTION, SOLUTION INTRAVENOUS CONTINUOUS
Status: DISCONTINUED | OUTPATIENT
Start: 2017-04-11 | End: 2017-04-11 | Stop reason: HOSPADM

## 2017-04-11 RX ORDER — FLUTICASONE PROPIONATE 50 MCG
2 SPRAY, SUSPENSION (ML) NASAL DAILY
Status: DISCONTINUED | OUTPATIENT
Start: 2017-04-12 | End: 2017-04-13 | Stop reason: HOSPADM

## 2017-04-11 RX ORDER — MIDAZOLAM HYDROCHLORIDE 1 MG/ML
0.5 INJECTION, SOLUTION INTRAMUSCULAR; INTRAVENOUS
Status: DISCONTINUED | OUTPATIENT
Start: 2017-04-11 | End: 2017-04-11 | Stop reason: HOSPADM

## 2017-04-11 RX ORDER — MORPHINE SULFATE 10 MG/ML
2 INJECTION, SOLUTION INTRAMUSCULAR; INTRAVENOUS
Status: DISCONTINUED | OUTPATIENT
Start: 2017-04-11 | End: 2017-04-11 | Stop reason: HOSPADM

## 2017-04-11 RX ORDER — SODIUM CHLORIDE 0.9 % (FLUSH) 0.9 %
5-10 SYRINGE (ML) INJECTION AS NEEDED
Status: DISCONTINUED | OUTPATIENT
Start: 2017-04-11 | End: 2017-04-11 | Stop reason: HOSPADM

## 2017-04-11 RX ORDER — HYDROXYZINE HYDROCHLORIDE 10 MG/1
10 TABLET, FILM COATED ORAL
Status: DISCONTINUED | OUTPATIENT
Start: 2017-04-11 | End: 2017-04-13 | Stop reason: HOSPADM

## 2017-04-11 RX ORDER — LIDOCAINE HYDROCHLORIDE 10 MG/ML
0.1 INJECTION, SOLUTION EPIDURAL; INFILTRATION; INTRACAUDAL; PERINEURAL AS NEEDED
Status: DISCONTINUED | OUTPATIENT
Start: 2017-04-11 | End: 2017-04-11 | Stop reason: HOSPADM

## 2017-04-11 RX ORDER — ASPIRIN 325 MG
325 TABLET, DELAYED RELEASE (ENTERIC COATED) ORAL 2 TIMES DAILY
Status: DISCONTINUED | OUTPATIENT
Start: 2017-04-11 | End: 2017-04-13 | Stop reason: HOSPADM

## 2017-04-11 RX ORDER — DEXTROSE 50 % IN WATER (D50W) INTRAVENOUS SYRINGE
12.5-25 AS NEEDED
Status: DISCONTINUED | OUTPATIENT
Start: 2017-04-11 | End: 2017-04-13 | Stop reason: HOSPADM

## 2017-04-11 RX ORDER — SODIUM CHLORIDE, SODIUM LACTATE, POTASSIUM CHLORIDE, CALCIUM CHLORIDE 600; 310; 30; 20 MG/100ML; MG/100ML; MG/100ML; MG/100ML
INJECTION, SOLUTION INTRAVENOUS
Status: DISCONTINUED | OUTPATIENT
Start: 2017-04-11 | End: 2017-04-11

## 2017-04-11 RX ORDER — DEXAMETHASONE SODIUM PHOSPHATE 100 MG/10ML
10 INJECTION INTRAMUSCULAR; INTRAVENOUS ONCE
Status: DISPENSED | OUTPATIENT
Start: 2017-04-11 | End: 2017-04-12

## 2017-04-11 RX ORDER — SODIUM CHLORIDE, SODIUM LACTATE, POTASSIUM CHLORIDE, CALCIUM CHLORIDE 600; 310; 30; 20 MG/100ML; MG/100ML; MG/100ML; MG/100ML
75 INJECTION, SOLUTION INTRAVENOUS CONTINUOUS
Status: DISCONTINUED | OUTPATIENT
Start: 2017-04-11 | End: 2017-04-11 | Stop reason: HOSPADM

## 2017-04-11 RX ORDER — HYDROMORPHONE HYDROCHLORIDE 1 MG/ML
0.2 INJECTION, SOLUTION INTRAMUSCULAR; INTRAVENOUS; SUBCUTANEOUS
Status: DISCONTINUED | OUTPATIENT
Start: 2017-04-11 | End: 2017-04-11 | Stop reason: HOSPADM

## 2017-04-11 RX ORDER — HYDROCODONE BITARTRATE AND ACETAMINOPHEN 5; 325 MG/1; MG/1
1 TABLET ORAL AS NEEDED
Status: DISCONTINUED | OUTPATIENT
Start: 2017-04-11 | End: 2017-04-11 | Stop reason: HOSPADM

## 2017-04-11 RX ORDER — OXYCODONE HYDROCHLORIDE 5 MG/1
10 TABLET ORAL
Status: DISCONTINUED | OUTPATIENT
Start: 2017-04-11 | End: 2017-04-13 | Stop reason: HOSPADM

## 2017-04-11 RX ORDER — MONTELUKAST SODIUM 10 MG/1
10 TABLET ORAL
Status: DISCONTINUED | OUTPATIENT
Start: 2017-04-11 | End: 2017-04-13 | Stop reason: HOSPADM

## 2017-04-11 RX ORDER — CEFAZOLIN SODIUM IN 0.9 % NACL 2 G/50 ML
2 INTRAVENOUS SOLUTION, PIGGYBACK (ML) INTRAVENOUS EVERY 8 HOURS
Status: COMPLETED | OUTPATIENT
Start: 2017-04-11 | End: 2017-04-12

## 2017-04-11 RX ORDER — FENTANYL CITRATE 50 UG/ML
INJECTION, SOLUTION INTRAMUSCULAR; INTRAVENOUS AS NEEDED
Status: DISCONTINUED | OUTPATIENT
Start: 2017-04-11 | End: 2017-04-11 | Stop reason: HOSPADM

## 2017-04-11 RX ORDER — FENTANYL CITRATE 50 UG/ML
50 INJECTION, SOLUTION INTRAMUSCULAR; INTRAVENOUS AS NEEDED
Status: DISCONTINUED | OUTPATIENT
Start: 2017-04-11 | End: 2017-04-11 | Stop reason: HOSPADM

## 2017-04-11 RX ORDER — POLYETHYLENE GLYCOL 3350 17 G/17G
17 POWDER, FOR SOLUTION ORAL DAILY
Status: DISCONTINUED | OUTPATIENT
Start: 2017-04-12 | End: 2017-04-13 | Stop reason: HOSPADM

## 2017-04-11 RX ORDER — PREGABALIN 75 MG/1
75 CAPSULE ORAL ONCE
Status: COMPLETED | OUTPATIENT
Start: 2017-04-11 | End: 2017-04-11

## 2017-04-11 RX ORDER — MIDAZOLAM HYDROCHLORIDE 1 MG/ML
INJECTION, SOLUTION INTRAMUSCULAR; INTRAVENOUS AS NEEDED
Status: DISCONTINUED | OUTPATIENT
Start: 2017-04-11 | End: 2017-04-11 | Stop reason: HOSPADM

## 2017-04-11 RX ORDER — ONDANSETRON 2 MG/ML
4 INJECTION INTRAMUSCULAR; INTRAVENOUS
Status: ACTIVE | OUTPATIENT
Start: 2017-04-11 | End: 2017-04-12

## 2017-04-11 RX ORDER — ACETAMINOPHEN 500 MG
1000 TABLET ORAL ONCE
Status: COMPLETED | OUTPATIENT
Start: 2017-04-11 | End: 2017-04-11

## 2017-04-11 RX ORDER — DEXAMETHASONE SODIUM PHOSPHATE 10 MG/ML
10 INJECTION INTRAMUSCULAR; INTRAVENOUS ONCE
Status: COMPLETED | OUTPATIENT
Start: 2017-04-11 | End: 2017-04-11

## 2017-04-11 RX ORDER — OXYCODONE HYDROCHLORIDE 5 MG/1
5 TABLET ORAL
Status: DISCONTINUED | OUTPATIENT
Start: 2017-04-11 | End: 2017-04-13 | Stop reason: HOSPADM

## 2017-04-11 RX ORDER — ACETAMINOPHEN 500 MG
500 TABLET ORAL
Status: DISCONTINUED | OUTPATIENT
Start: 2017-04-11 | End: 2017-04-13 | Stop reason: HOSPADM

## 2017-04-11 RX ORDER — SODIUM CHLORIDE 0.9 % (FLUSH) 0.9 %
5-10 SYRINGE (ML) INJECTION EVERY 8 HOURS
Status: DISCONTINUED | OUTPATIENT
Start: 2017-04-12 | End: 2017-04-13 | Stop reason: HOSPADM

## 2017-04-11 RX ORDER — AMOXICILLIN 250 MG
1 CAPSULE ORAL 2 TIMES DAILY
Status: DISCONTINUED | OUTPATIENT
Start: 2017-04-11 | End: 2017-04-13 | Stop reason: HOSPADM

## 2017-04-11 RX ORDER — FACIAL-BODY WIPES
10 EACH TOPICAL DAILY PRN
Status: DISCONTINUED | OUTPATIENT
Start: 2017-04-13 | End: 2017-04-13 | Stop reason: HOSPADM

## 2017-04-11 RX ORDER — HYDROMORPHONE HYDROCHLORIDE 1 MG/ML
0.5 INJECTION, SOLUTION INTRAMUSCULAR; INTRAVENOUS; SUBCUTANEOUS
Status: DISPENSED | OUTPATIENT
Start: 2017-04-11 | End: 2017-04-12

## 2017-04-11 RX ORDER — SULFASALAZINE 500 MG/1
1000 TABLET ORAL 2 TIMES DAILY
Status: DISCONTINUED | OUTPATIENT
Start: 2017-04-11 | End: 2017-04-13 | Stop reason: HOSPADM

## 2017-04-11 RX ORDER — SODIUM CHLORIDE 0.9 % (FLUSH) 0.9 %
5-10 SYRINGE (ML) INJECTION AS NEEDED
Status: DISCONTINUED | OUTPATIENT
Start: 2017-04-11 | End: 2017-04-13 | Stop reason: HOSPADM

## 2017-04-11 RX ORDER — ONDANSETRON 2 MG/ML
4 INJECTION INTRAMUSCULAR; INTRAVENOUS AS NEEDED
Status: DISCONTINUED | OUTPATIENT
Start: 2017-04-11 | End: 2017-04-11 | Stop reason: HOSPADM

## 2017-04-11 RX ORDER — SODIUM CHLORIDE 9 MG/ML
50 INJECTION, SOLUTION INTRAVENOUS CONTINUOUS
Status: DISCONTINUED | OUTPATIENT
Start: 2017-04-11 | End: 2017-04-11 | Stop reason: HOSPADM

## 2017-04-11 RX ORDER — SODIUM CHLORIDE 9 MG/ML
25 INJECTION, SOLUTION INTRAVENOUS CONTINUOUS
Status: DISCONTINUED | OUTPATIENT
Start: 2017-04-11 | End: 2017-04-11 | Stop reason: HOSPADM

## 2017-04-11 RX ORDER — ATORVASTATIN CALCIUM 10 MG/1
10 TABLET, FILM COATED ORAL
Status: DISCONTINUED | OUTPATIENT
Start: 2017-04-11 | End: 2017-04-13 | Stop reason: HOSPADM

## 2017-04-11 RX ORDER — FAMOTIDINE 20 MG/1
20 TABLET, FILM COATED ORAL 2 TIMES DAILY
Status: DISCONTINUED | OUTPATIENT
Start: 2017-04-11 | End: 2017-04-13 | Stop reason: HOSPADM

## 2017-04-11 RX ORDER — CEFAZOLIN SODIUM IN 0.9 % NACL 2 G/50 ML
2 INTRAVENOUS SOLUTION, PIGGYBACK (ML) INTRAVENOUS ONCE
Status: COMPLETED | OUTPATIENT
Start: 2017-04-11 | End: 2017-04-11

## 2017-04-11 RX ORDER — FENTANYL CITRATE 50 UG/ML
25 INJECTION, SOLUTION INTRAMUSCULAR; INTRAVENOUS
Status: COMPLETED | OUTPATIENT
Start: 2017-04-11 | End: 2017-04-11

## 2017-04-11 RX ORDER — PHENYLEPHRINE HCL IN 0.9% NACL 0.4MG/10ML
SYRINGE (ML) INTRAVENOUS AS NEEDED
Status: DISCONTINUED | OUTPATIENT
Start: 2017-04-11 | End: 2017-04-11 | Stop reason: HOSPADM

## 2017-04-11 RX ORDER — ALBUTEROL SULFATE 0.83 MG/ML
2.5 SOLUTION RESPIRATORY (INHALATION)
Status: DISCONTINUED | OUTPATIENT
Start: 2017-04-11 | End: 2017-04-13 | Stop reason: HOSPADM

## 2017-04-11 RX ORDER — SODIUM CHLORIDE 0.9 % (FLUSH) 0.9 %
5-10 SYRINGE (ML) INJECTION EVERY 8 HOURS
Status: DISCONTINUED | OUTPATIENT
Start: 2017-04-11 | End: 2017-04-11 | Stop reason: HOSPADM

## 2017-04-11 RX ORDER — AMLODIPINE BESYLATE 5 MG/1
5 TABLET ORAL DAILY
Status: DISCONTINUED | OUTPATIENT
Start: 2017-04-12 | End: 2017-04-13 | Stop reason: HOSPADM

## 2017-04-11 RX ORDER — CELECOXIB 200 MG/1
200 CAPSULE ORAL ONCE
Status: COMPLETED | OUTPATIENT
Start: 2017-04-11 | End: 2017-04-11

## 2017-04-11 RX ORDER — SODIUM CHLORIDE 9 MG/ML
125 INJECTION, SOLUTION INTRAVENOUS CONTINUOUS
Status: DISPENSED | OUTPATIENT
Start: 2017-04-11 | End: 2017-04-12

## 2017-04-11 RX ORDER — PROPOFOL 10 MG/ML
INJECTION, EMULSION INTRAVENOUS
Status: DISCONTINUED | OUTPATIENT
Start: 2017-04-11 | End: 2017-04-11 | Stop reason: HOSPADM

## 2017-04-11 RX ORDER — MAGNESIUM SULFATE 100 %
4 CRYSTALS MISCELLANEOUS AS NEEDED
Status: DISCONTINUED | OUTPATIENT
Start: 2017-04-11 | End: 2017-04-13 | Stop reason: HOSPADM

## 2017-04-11 RX ADMIN — DOCUSATE SODIUM AND SENNOSIDES 1 TABLET: 8.6; 5 TABLET, FILM COATED ORAL at 21:36

## 2017-04-11 RX ADMIN — ASPIRIN 325 MG: 325 TABLET, DELAYED RELEASE ORAL at 20:00

## 2017-04-11 RX ADMIN — HYDROMORPHONE HYDROCHLORIDE 0.2 MG: 1 INJECTION, SOLUTION INTRAMUSCULAR; INTRAVENOUS; SUBCUTANEOUS at 17:45

## 2017-04-11 RX ADMIN — SODIUM CHLORIDE, SODIUM LACTATE, POTASSIUM CHLORIDE, AND CALCIUM CHLORIDE 125 ML/HR: 600; 310; 30; 20 INJECTION, SOLUTION INTRAVENOUS at 11:05

## 2017-04-11 RX ADMIN — ONDANSETRON 4 MG: 2 INJECTION INTRAMUSCULAR; INTRAVENOUS at 17:13

## 2017-04-11 RX ADMIN — MIDAZOLAM HYDROCHLORIDE 3 MG: 1 INJECTION, SOLUTION INTRAMUSCULAR; INTRAVENOUS at 12:40

## 2017-04-11 RX ADMIN — FENTANYL CITRATE 25 MCG: 50 INJECTION, SOLUTION INTRAMUSCULAR; INTRAVENOUS at 13:27

## 2017-04-11 RX ADMIN — PROPOFOL 35 MCG/KG/MIN: 10 INJECTION, EMULSION INTRAVENOUS at 13:44

## 2017-04-11 RX ADMIN — CELECOXIB 200 MG: 200 CAPSULE ORAL at 11:17

## 2017-04-11 RX ADMIN — PREGABALIN 75 MG: 75 CAPSULE ORAL at 11:17

## 2017-04-11 RX ADMIN — FAMOTIDINE 20 MG: 20 TABLET ORAL at 21:36

## 2017-04-11 RX ADMIN — HYDROMORPHONE HYDROCHLORIDE 0.2 MG: 1 INJECTION, SOLUTION INTRAMUSCULAR; INTRAVENOUS; SUBCUTANEOUS at 17:15

## 2017-04-11 RX ADMIN — MIDAZOLAM HYDROCHLORIDE 1 MG: 1 INJECTION, SOLUTION INTRAMUSCULAR; INTRAVENOUS at 13:36

## 2017-04-11 RX ADMIN — Medication 80 MCG: at 14:52

## 2017-04-11 RX ADMIN — ACETAMINOPHEN 500 MG: 500 TABLET, FILM COATED ORAL at 21:36

## 2017-04-11 RX ADMIN — FENTANYL CITRATE 25 MCG: 50 INJECTION, SOLUTION INTRAMUSCULAR; INTRAVENOUS at 13:44

## 2017-04-11 RX ADMIN — HYDROMORPHONE HYDROCHLORIDE 0.2 MG: 1 INJECTION, SOLUTION INTRAMUSCULAR; INTRAVENOUS; SUBCUTANEOUS at 17:30

## 2017-04-11 RX ADMIN — FENTANYL CITRATE 25 MCG: 50 INJECTION, SOLUTION INTRAMUSCULAR; INTRAVENOUS at 17:25

## 2017-04-11 RX ADMIN — SODIUM CHLORIDE 125 ML/HR: 900 INJECTION, SOLUTION INTRAVENOUS at 16:24

## 2017-04-11 RX ADMIN — FENTANYL CITRATE 25 MCG: 50 INJECTION, SOLUTION INTRAMUSCULAR; INTRAVENOUS at 13:37

## 2017-04-11 RX ADMIN — ATORVASTATIN CALCIUM 10 MG: 10 TABLET, FILM COATED ORAL at 21:35

## 2017-04-11 RX ADMIN — FENTANYL CITRATE 25 MCG: 50 INJECTION, SOLUTION INTRAMUSCULAR; INTRAVENOUS at 17:39

## 2017-04-11 RX ADMIN — HYDROMORPHONE HYDROCHLORIDE 0.5 MG: 1 INJECTION, SOLUTION INTRAMUSCULAR; INTRAVENOUS; SUBCUTANEOUS at 20:30

## 2017-04-11 RX ADMIN — SULFASALAZINE 1000 MG: 500 TABLET ORAL at 21:35

## 2017-04-11 RX ADMIN — SODIUM CHLORIDE, SODIUM LACTATE, POTASSIUM CHLORIDE, AND CALCIUM CHLORIDE: 600; 310; 30; 20 INJECTION, SOLUTION INTRAVENOUS at 14:30

## 2017-04-11 RX ADMIN — LIDOCAINE HYDROCHLORIDE 0.1 ML: 10 INJECTION, SOLUTION EPIDURAL; INFILTRATION; INTRACAUDAL; PERINEURAL at 11:05

## 2017-04-11 RX ADMIN — MIDAZOLAM HYDROCHLORIDE 1 MG: 1 INJECTION, SOLUTION INTRAMUSCULAR; INTRAVENOUS at 13:32

## 2017-04-11 RX ADMIN — MIDAZOLAM HYDROCHLORIDE 2 MG: 1 INJECTION, SOLUTION INTRAMUSCULAR; INTRAVENOUS at 13:27

## 2017-04-11 RX ADMIN — FENTANYL CITRATE 25 MCG: 50 INJECTION, SOLUTION INTRAMUSCULAR; INTRAVENOUS at 17:15

## 2017-04-11 RX ADMIN — FENTANYL CITRATE 25 MCG: 50 INJECTION, SOLUTION INTRAMUSCULAR; INTRAVENOUS at 17:45

## 2017-04-11 RX ADMIN — FENTANYL CITRATE 50 MCG: 50 INJECTION, SOLUTION INTRAMUSCULAR; INTRAVENOUS at 12:41

## 2017-04-11 RX ADMIN — CEFAZOLIN 2 G: 1 INJECTION, POWDER, FOR SOLUTION INTRAMUSCULAR; INTRAVENOUS; PARENTERAL at 20:30

## 2017-04-11 RX ADMIN — MIDAZOLAM HYDROCHLORIDE 1 MG: 1 INJECTION, SOLUTION INTRAMUSCULAR; INTRAVENOUS at 13:48

## 2017-04-11 RX ADMIN — Medication 80 MCG: at 14:49

## 2017-04-11 RX ADMIN — Medication 80 MCG: at 14:36

## 2017-04-11 RX ADMIN — ACETAMINOPHEN 1000 MG: 500 TABLET, FILM COATED ORAL at 11:18

## 2017-04-11 RX ADMIN — CEFAZOLIN 2 G: 1 INJECTION, POWDER, FOR SOLUTION INTRAMUSCULAR; INTRAVENOUS; PARENTERAL at 13:30

## 2017-04-11 RX ADMIN — DEXAMETHASONE SODIUM PHOSPHATE 8 MG: 10 INJECTION, SOLUTION INTRAMUSCULAR; INTRAVENOUS at 14:59

## 2017-04-11 NOTE — PROGRESS NOTES
Primary Nurse Hussein Alfred RN and Jeanine Alarcon RN performed a dual skin assessment on this patient No impairment noted  Ramón score is 20

## 2017-04-11 NOTE — IP AVS SNAPSHOT
[address] 
[phone] Patient: Gayathri Cruz MRN: [id number] You are allergic to the following Allergen Reactions Latex Swelling SWELLING TONGUE, ORAL CAVITY Benadryl (Diphenhydramine Hcl) Unknown (comments) Raises heart rate Atropine Swelling Eye swelling Chocolate (Cocoa) Rash Compazine (Prochlorperazine Edisylate) Other (comments) Muscle spasm, POSSIBLE SEIZURES Cymbalta (Duloxetine) Vertigo Diclofenac Sodium (Bulk) Rash Milk Rash Phenobarbital Other (comments)  
 tachy Recent Documentation Height Weight BMI OB Status Smoking Status 1.575 m 84.8 kg 34.19 kg/m2 Hysterectomy Never Smoker Emergency Contacts Name Discharge Info Relation Home Work Mobile [address] [place] CAREGIVER [3] Spouse [3] [phone] About your hospitalization You were admitted on:  April 11, 2017 [facility]:  April 13, 2017 Unit phone number:  [phone] Why you were hospitalized Your primary diagnosis was:  Primary Osteoarthritis Of Right Knee Your diagnoses also included:  Obesity (Bmi 30.0-34. 9) Providers Seen During Your Hospitalizations Provider Role Specialty Primary office phone Pranav Jenkins MD Attending Provider Orthopedic Surgery [phone] Your Primary Care Physician (PCP) Primary Care Physician Office Phone Office Fax Davida Gaytan [phone] Follow-up Information Follow up With Details Comments Contact Info At [facility] Health/Physical therapy [phone] Mago Coles MD   [address] 
[phone] Current Discharge Medication List  
  
 START taking these medications Dose & Instructions Dispensing Information Comments Morning Noon Evening Bedtime  
 aspirin delayed-release 325 mg tablet Your last dose was: Your next dose is:    
   
   
 Dose:  325 mg Take 1 Tab by mouth two (2) times a day. Quantity:  60 Tab Refills:  0  
     
   
   
   
  
 oxyCODONE IR 5 mg immediate release tablet Commonly known as:  Jimy Hoyt Your last dose was: Your next dose is:    
   
   
 Dose:  5-10 mg Take 1-2 Tabs by mouth every four (4) hours as needed for Pain. Max Daily Amount: 60 mg.  
 Quantity:  90 Tab Refills:  0 CONTINUE these medications which have CHANGED Dose & Instructions Dispensing Information Comments Morning Noon Evening Bedtime  
 atorvastatin 20 mg tablet Commonly known as:  LIPITOR What changed:  See the new instructions. Your last dose was: Your next dose is: TAKE 1 TABLET BY MOUTH EVERY EVENING Quantity:  30 Tab Refills:  5 CONTINUE these medications which have NOT CHANGED Dose & Instructions Dispensing Information Comments Morning Noon Evening Bedtime * albuterol 90 mcg/actuation inhaler Commonly known as:  PROVENTIL HFA, VENTOLIN HFA, PROAIR HFA Your last dose was: Your next dose is:    
   
   
 Dose:  1 Puff Take 1 Puff by inhalation every six (6) hours as needed. Refills:  0  
     
   
   
   
  
 * albuterol 90 mcg/actuation inhaler Commonly known as:  PROVENTIL HFA, VENTOLIN HFA, PROAIR HFA Your last dose was: Your next dose is:    
   
   
 Dose:  1 Puff Take 1 Puff by inhalation every six (6) hours as needed for Wheezing for up to 30 days. Quantity:  1 Inhaler Refills:  3  
     
   
   
   
  
 amLODIPine 5 mg tablet Commonly known as:  Claudell Hesselbach Your last dose was: Your next dose is: TAKE 1 TABLET BY MOUTH DAILY Quantity:  90 Tab Refills:  0  
     
   
   
   
  
 fluticasone 50 mcg/actuation nasal spray Commonly known as:  Mee Us Your last dose was: Your next dose is:    
   
   
 Dose:  2 Spray  
2 sprays by Both Nostrils route daily. Quantity:  1 Bottle Refills:  3  
     
   
   
   
  
 melatonin 5 mg Cap capsule Your last dose was: Your next dose is:    
   
   
 Dose:  5 mg Take 5 mg by mouth nightly. Refills:  0  
     
   
   
   
  
 metFORMIN  mg tablet Commonly known as:  GLUCOPHAGE XR Your last dose was: Your next dose is: TAKE 1 TABLET BY MOUTH EVERY DAY WITH DINNER Quantity:  30 Tab Refills:  6  
     
   
   
   
  
 montelukast 10 mg tablet Commonly known as:  SINGULAIR Your last dose was: Your next dose is: TAKE 1 TABLET BY MOUTH EVERY DAY Quantity:  30 Tab Refills:  11  
     
   
   
   
  
 olmesartan-hydroCHLOROthiazide 40-25 mg per tablet Commonly known as:  BENICAR HCT Your last dose was: Your next dose is:    
   
   
 Dose:  1 Tab Take 1 Tab by mouth daily. Quantity:  90 Tab Refills:  2  
     
   
   
   
  
 prednisoLONE acetate 1 % ophthalmic suspension Commonly known as:  PRED FORTE Your last dose was: Your next dose is:    
   
   
 Dose:  1 Drop Administer 1 Drop to both eyes as needed. Refills:  0  
     
   
   
   
  
 raNITIdine 150 mg tablet Commonly known as:  ZANTAC Your last dose was: Your next dose is:    
   
   
 Dose:  150 mg Take 1 Tab by mouth two (2) times a day. Quantity:  30 Tab Refills:  0  
     
   
   
   
  
 sulfaSALAzine 500 mg tablet Commonly known as:  AZULFIDINE Your last dose was: Your next dose is:    
   
   
 Dose:  1000 mg Take 1,000 mg by mouth two (2) times a day. Refills:  0 * Notice: This list has 2 medication(s) that are the same as other medications prescribed for you. Read the directions carefully, and ask your doctor or other care provider to review them with you. STOP taking these medications HYDROcodone-acetaminophen 5-325 mg per tablet Commonly known as:  Bao Washington  
   
  
 REMICADE IV Where to Get Your Medications Information on where to get these meds will be given to you by the nurse or doctor. ! Ask your nurse or doctor about these medications  
  aspirin delayed-release 325 mg tablet  
 oxyCODONE IR 5 mg immediate release tablet Discharge Instructions Patient meets criteria for BUNDLED PAYMENT  
for Care Improvement Initiative Criteria Contact Information for Orthopedic Nurse Navigator:     
ATUL Anthony, RN-BC 
J:[phone] U:[phone] Y:[phone] After Hospital Care Plan:  Discharge Instructions Knee Replacement- Dr. Huong Whitlock Patient Name: Ralph Peters Date of procedure: 4/11/2017 Procedure: Procedure(s): RIGHT TOTAL KNEE ARTHROPLASTY Surgeon: Surgeon(s) and Role: Daly Whittington MD - Primary PCP: Mihcael Ch MD 
Date of discharge: No discharge date for patient encounter. Follow up appointments ? Follow up with Dr. Huong Whitlock in 4 weeks. Call [phone] to make an appointment. ? If home health has been arranged for you the agency will contact you to arrange dates/times for visits. Please call them if you do not hear from them within 24 hours after you are discharged When to call your Orthopaedic Surgeon: Call [phone]. If you call after 5pm or on a weekend, the on call physician will be contacted ? Unrelieved pain ? Signs of infection-if your incision is red; continues to have drainage; drainage has a foul odor or if you have a persistent fever over 101 degrees ? Signs of a blood clot in your leg-calf pain, tenderness, redness, swelling of lower leg When to call your Primary Care Physician: 
? Concerns about medical conditions such as diabetes, high blood pressure, asthma, congestive heart failure ? Call if blood sugars are elevated, persistent headache or dizziness, coughing or congestion, constipation or diarrhea, burning with urination, abnormal heart rate When to call 911and go to the nearest emergency room ? Acute onset of chest pain, shortness of breath, difficulty breathing Activity ? Weight bearing as tolerated with walker or crutches. Refer to pages 23-31 of your handbook for instructions and pictures ? Complete your Home Exercise Program daily as instructed by your therapist.  Refer to pages 33-41 of your handbook for instructions and pictures ? Get up every one hour and walk (except at night when sleeping) ? Do not drive or operate heavy machinery Incision Care ? The Aquacel (brown, waterproof) surgical dressing is to remain on your knee for 7 days. On the 7th day have someone gently peel the dressing off by carefully lifting the edge and stretching it slightly to break the adhesive seal 
? If you have steri-strips (small, white pieces of tape) on your incision, they may come off when you remove the Aquacel surgical dressing. This is okay. You may now leave your incision open to air ? If your Aquacel dressing comes loose/off before the 7th day, you may replace it with a dry sterile gauze dressing; change it daily. Once you incision is not draining, you may leave it open to air ? You may take a shower with the Aquacel dressing in place. Once the Aquacel is removed, you may shower and get your incision wet but do not submerge your incision under water in a bath tub, hot tub or swimming pool for 6 weeks after surgery. Preventing blood clots ? Take Enteric coated Aspirin (delayed release) one tablet twice a day for one month following surgery Pain management ? Take pain medication as prescribed; decrease the amount you use as your pain lessens ? Avoid alcoholic beverages while taking pain medication ? Continue to take Tylenol (acetaminophen) for pain control. Please be aware that many medications contain Tylenol. We do not want you to over medicate so please read the information below as a guide. Do not take more than 3 Grams of Tylenol in a 24 hour period. (There are 1000 milligrams in one Gram) o Tylenol 325 mg per tablet (do not take more than 9 tablets in 24 hours) o Tylenol 500 mg per tablet (do not take more than 6 tablets in 24 hours) o Tylenol 650 mg per tablet (do not take more than 4 tablets in 24 hours) ? You may place an ice bag on your knee for 15-20 minutes after exercising and as needed for comfort Diet ? Resume usual diet; drink plenty of fluids; eat foods high in fiber ? You may want to take a stool softener (such as Senokot-S or Colace) to prevent constipation while you are taking pain medication. If constipation occurs, take a laxative (such as Dulcolax tablets, Milk of Magnesia, or a suppository) Home Health Care Protocol (to be followed by [address]) Nursing-per physicians order ? Complete head to toe assessment, vital signs ? Medication reconciliation ? Review pain management ? Manage chronic medical conditions Physical Therapy-per physicians order Weight bearing status: 
Precautions at Admission: Fall, WBAT Right Side Weight Bearing: As tolerated Mobility Status: 
Supine to Sit: Modified independent Sit to Stand: Stand-by asssistance Gait: 
Distance (ft): 120 Feet (ft) Ambulation - Level of Assistance: Contact guard assistance Assistive Device: Gait belt, Walker, rolling Gait Abnormalities: Antalgic, Decreased step clearance, Step to gait ADL status overall composite: 
  
  
  
  
 
Physical Therapy ?  Assessment and evaluation-bed mobility; functional transfers (bed, chair, bathroom, stairs); ambulation with equipment, car transfers, shower transfers, safety and ability to get out of house in the event of an emergency ? Review weight bearing as tolerated, wean from walker or crutches as tolerated ? Discuss pain management ? Review how to do ADLs. Refer to page 42 of patient handbook Home Exercise Program-refer to pages 33-41 of patient handbook for exercises. Discharge Orders None ACO Transitions of Care Introducing [facility] [address] offers a voluntary care coordination program to provide high quality service and care to [facility] fee-for-service beneficiaries. Guy Thompson was designed to help you enhance your health and well-being through the following services: ? Transitions of Care  support for individuals who are transitioning from one care setting to another (example: Hospital to home). ? Chronic and Complex Care Coordination  support for individuals and caregivers of those with serious or chronic illnesses or with more than one chronic (ongoing) condition and those who take a number of different medications. If you meet specific medical criteria, a [address] [place] [address] Rd may call you directly to coordinate your care with your primary care physician and your other care providers. For questions about the [facility] programs, please, contact your physicians office. For general questions or additional information about Accountable Care Organizations: 
Please visit www.medicare.gov/acos. html or call 1-800-MEDICARE (1-912.641.2129) TTY users should call 0-646.839.1058. Introducing \[facility] [place] [facility]\"" & HEALTH SERVICES! Dear Sherrell Holt: 
Thank you for requesting a [employer] account. Our records indicate that you already have an active [employer] account. You can access your account anytime at [email or web address] Did you know that you can access your hospital and ER discharge instructions at any time in [employer]? You can also review all of your test results from your hospital stay or ER visit. Additional Information If you have questions, please visit the Frequently Asked Questions section of the [employer] website at [email or web address] Remember, Home[employer] is NOT to be used for urgent needs. For medical emergencies, dial 911. Now available from your iPhone and Android! General Information Please provide this summary of care documentation to your next provider. Patient Signature:  ____________________________________________________________ Date:  ____________________________________________________________  
  
Arna Star Provider Signature:  ____________________________________________________________ Date:  ____________________________________________________________

## 2017-04-11 NOTE — ANESTHESIA POSTPROCEDURE EVALUATION
Post-Anesthesia Evaluation and Assessment    Patient: Rosibel Gilbert MRN: [id number]  SSN: [id number]    YOB: 1950  Age: 77 y.o. Sex: female       Cardiovascular Function/Vital Signs  Visit Vitals    /77    Pulse 82    Temp 36.6 °C (97.8 °F)    Resp 15    Ht 5' 2\" (1.575 m)    Wt 84.8 kg (186 lb 15.2 oz)    SpO2 100%    BMI 34.19 kg/m2       Patient is status post spinal anesthesia for Procedure(s):  RIGHT TOTAL KNEE ARTHROPLASTY . Nausea/Vomiting: None    Postoperative hydration reviewed and adequate. Pain:  Pain Scale 1: Numeric (0 - 10) (04/11/17 1605)  Pain Intensity 1: 0 (04/11/17 1605)   Managed    Neurological Status:   Neuro (WDL): Exceptions to WDL (s/p spinal BLE numb and weak) (04/11/17 1605)  Neuro  Neurologic State: Appropriate for age;Eyes open spontaneously; Eyes open to voice (04/11/17 1605)  LUE Motor Response: Purposeful (04/11/17 1605)  LLE Motor Response: Purposeful;Weak;Numbness; Pharmacologically paralyzed (04/11/17 1605)  RUE Motor Response: Purposeful (04/11/17 1605)  RLE Motor Response: Purposeful;Weak;Numbness; Pharmacologically paralyzed (04/11/17 1605)   At baseline    Mental Status and Level of Consciousness: Arousable    Pulmonary Status:   O2 Device: Nasal cannula (04/11/17 1605)   Adequate oxygenation and airway patent    Complications related to anesthesia: None    Post-anesthesia assessment completed.  No concerns    Signed By: Quiana Fam MD     April 11, 2017

## 2017-04-11 NOTE — ANESTHESIA PROCEDURE NOTES
Peripheral Block    Start time: 4/11/2017 1:05 PM  End time: 4/11/2017 1:11 PM  Performed by: An Whitehead  Authorized by: An Whitehead       Pre-procedure: Indications: at surgeon's request and post-op pain management    Preanesthetic Checklist: patient identified, risks and benefits discussed, site marked, timeout performed and patient being monitored      Block Type:   Block Type:   Adductor canal  Laterality:  Right  Monitoring:  Standard ASA monitoring, continuous pulse ox, frequent vital sign checks, heart rate, responsive to questions and oxygen  Injection Technique:  Single shot  Procedures: ultrasound guided    Patient Position: supine  Prep: DuraPrep    Needle Type:  Stimuplex  Needle Gauge:  22 G  Needle Localization:  Ultrasound guidance  Medication Injected:  0.5%  ropivacaine  Volume (mL):  20    Assessment:  Number of attempts:  1  Injection Assessment:  Incremental injection every 5 mL, local visualized surrounding nerve on ultrasound, negative aspiration for blood, no paresthesia and no intravascular symptoms  Patient tolerance:  Patient tolerated the procedure well with no immediate complications

## 2017-04-11 NOTE — PROGRESS NOTES
Bedside and Verbal shift change report given to rafael (oncoming nurse) by Alverto Alaniz (offgoing nurse). Report included the following information SBAR, Kardex, OR Summary, Procedure Summary, Intake/Output, MAR and Recent Results.

## 2017-04-11 NOTE — ANESTHESIA PROCEDURE NOTES
Spinal Block    Performed by: Buddy Cowan  Authorized by: Buddy Cowan     Pre-procedure: Indications: primary anesthetic          Assessment:      Spinal Block Procedure Note    Anesthetic procedure including post-op analgesic plan was discussed and all questions were answered. Pt. agrees to proceed. Patient was assisted to the seated position in the O.R., a \"time-out\" was performed. Monitors (ECG, NIBP & Sp02) and oxygen @ 2LPM via nasal cannula were applied. Mild sedation was administered. The back was prepped at the lumbar region with topical antiseptic and draped. 3mL 1% lidocaine was injected locally at the L3-4 interspace. A 25 G pencil point spinal needle was placed @ the above noted interspace and advanced until CSF was obtained. No blood or paresthesia was noted. Bupivacaine-MPF(hyperbaric) 12mg   was injected into the CSF. Patient tolerated the procedure well. No apparent complications were noted.

## 2017-04-11 NOTE — PROGRESS NOTES
The following herbal, alternative, and/or nutritional/dietary supplement product(s) has been discontinued  per [facility] approved policy:    Melatonin (medication name/dose/frequency)    Please reorder upon discharge if appropriate.

## 2017-04-11 NOTE — H&P (VIEW-ONLY)
HISTORY OF PRESENT ILLNESS  Katie Marte is a 77 y.o. female. HPI  Katie Marte was referred for evaluation by: Dr. Kulkarni for Pre- Op Evaluation. Please see encounter details and orders for consultative summary. Type of surgery : Right total knee replacement  Surgery site : Right knee  Anesthesia type: Spinal with IV sedation  Date of procedure:  4/11/2017    Allergies: Allergies   Allergen Reactions    Latex Swelling    Benadryl [Diphenhydramine Hcl] Unknown (comments)     Raises heart rate    Atropine Swelling     Eye swelling    Compazine [Prochlorperazine Edisylate] Other (comments)     Muscle spasm    Cymbalta [Duloxetine] Vertigo    Diclofenac Sodium (Bulk) Rash    Phenobarbital Other (comments)     tachy     Latex allergy: yes  Prior reactions to anesthesia:  Has had early awakening with her cataract surgeries and Hysterectomy. Functional status:  she is able to ambulate up 1 flights of step without shortness of breath, chest pain  Prior cardiac evaluation:   Had cardiac workup for SOB 1/2016 with negative Lexiscan with EF of 65%. Has been taking Tums on a daily basis and has increased her usage up to 3 times per day which seems to keep her heartburn under control. Does not wish to take PPI due to side effect profile. Has history of Ankylosing spondylitis and is currently receiving Remicade infusions and takes Sulfasalazine. Her Iritis has been stable and she is not currently using steroid eye drops. Has started coughing over the past 2-3 days and has needed to use her Albuterol inhaler several times but denies purulent mucous or productive cough; denies fever, chills. Current Outpatient Prescriptions   Medication Sig    raNITIdine (ZANTAC) 150 mg tablet Take 1 Tab by mouth two (2) times a day.  benzonatate (TESSALON) 200 mg capsule Take 1 Cap by mouth three (3) times daily as needed for Cough for up to 7 days.     amLODIPine (NORVASC) 5 mg tablet TAKE 1 TABLET BY MOUTH DAILY    atorvastatin (LIPITOR) 20 mg tablet TAKE 1 TABLET BY MOUTH EVERY EVENING    metFORMIN ER (GLUCOPHAGE XR) 500 mg tablet TAKE 1 TABLET BY MOUTH EVERY DAY WITH DINNER    olmesartan-hydrochlorothiazide (BENICAR HCT) 40-25 mg per tablet Take 1 Tab by mouth daily.  montelukast (SINGULAIR) 10 mg tablet TAKE 1 TABLET BY MOUTH EVERY DAY    HYDROcodone-acetaminophen (NORCO) 5-325 mg per tablet Take 1 Tab by mouth every eight (8) hours as needed.  sulfaSALAzine (AZULFIDINE) 500 mg tablet Take 1,000 mg by mouth two (2) times a day.  albuterol (PROVENTIL HFA, VENTOLIN HFA, PROAIR HFA) 90 mcg/actuation inhaler Take 1 Puff by inhalation every six (6) hours as needed.  fluticasone (FLONASE) 50 mcg/actuation nasal spray 2 sprays by Both Nostrils route daily.  INFLIXIMAB (REMICADE IV) by IntraVENous route. Every 60 days    albuterol (PROVENTIL HFA, VENTOLIN HFA, PROAIR HFA) 90 mcg/actuation inhaler Take 1 Puff by inhalation every six (6) hours as needed for Wheezing for up to 30 days.  folic acid (FOLVITE) 1 mg tablet Take 2 mg by mouth daily. No current facility-administered medications for this visit.       Past Medical History:   Diagnosis Date    Ankylosing spondylitis ([facility] [address] 2/8/2012    Autoimmune disease ([facility] [address]     ankylosing spondylitis    Diabetes ([facility] [address]     Essential hypertension, benign     First degree atrioventricular block     Hypercholesteremia     Iritis 1/15/2009    S/P partial hysterectomy     Sacroiliitis ([facility] [address] 9/15/2010    Spinal stenosis, lumbar 5/29/14    mri tamiko at l4-l5    TMJ (dislocation of temporomandibular joint) 9/4/2009    Unspecified adverse effect of anesthesia     woke up durning  cataract and hysterectomy      Past Surgical History:   Procedure Laterality Date    ENDOSCOPY, COLON, DIAGNOSTIC  1/01/07    negative     HX CATARACT REMOVAL  1992    bilateral  / lens implant    HX GYN  9/28/13    bilat oophorectomy    HX HYSTERECTOMY  2008    HX KNEE ARTHROSCOPY  2003    right    HX ORTHOPAEDIC  8/6/15    LUMBAR LAMINECTOMY L3-4 dr Du Fontaine     Social History   Substance Use Topics    Smoking status: Never Smoker    Smokeless tobacco: Never Used    Alcohol use Yes      Comment: occassional     Denies diagnosis of sleep apnea and does not use any CPAP device. Denies history of DVT or Pulmonary embolism; denies bleeding disorder. Blood pressure 149/75, pulse 87, temperature 98.4 °F (36.9 °C), temperature source Oral, resp. rate 18, height 5' 1\" (1.549 m), weight 187 lb (84.8 kg), SpO2 98 %. Review of Systems   Constitutional: Negative for chills, fever and malaise/fatigue. HENT: Positive for congestion. Negative for sore throat. Eyes: Positive for blurred vision. Respiratory: Positive for cough and wheezing. Negative for sputum production and shortness of breath. Cardiovascular: Negative for chest pain, palpitations and leg swelling. Gastrointestinal: Negative for abdominal pain, constipation, diarrhea, nausea and vomiting. Genitourinary: Negative for dysuria and frequency. Musculoskeletal: Positive for back pain and joint pain (right knee pain). Skin: Negative for rash. Neurological: Negative for dizziness, tingling and headaches. Endo/Heme/Allergies: Does not bruise/bleed easily. Psychiatric/Behavioral: Negative for depression. The patient is not nervous/anxious. Physical Exam   Constitutional: She is oriented to person, place, and time. She appears well-developed and well-nourished. HENT:   Head: Normocephalic and atraumatic. Right Ear: External ear normal.   Left Ear: External ear normal.   Nose: Nose normal.   Mouth/Throat: Oropharynx is clear and moist.   Eyes: Conjunctivae are normal.   Neck: Normal range of motion. Neck supple. No thyromegaly present. Cardiovascular: Normal rate, regular rhythm and normal heart sounds. Pulmonary/Chest: Effort normal. She has wheezes.  She has no rales.   Abdominal: Soft. Bowel sounds are normal. There is no tenderness. There is no rebound. Musculoskeletal:        Right knee: She exhibits decreased range of motion. Tenderness found. Medial joint line and lateral joint line tenderness noted. Lymphadenopathy:     She has no cervical adenopathy. Neurological: She is alert and oriented to person, place, and time. Skin: Skin is warm and dry. No rash noted. Psychiatric: She has a normal mood and affect. Her behavior is normal.   Nursing note and vitals reviewed. ASSESSMENT and Chetna Villegas was seen today for pre-op exam and wheezing. Diagnoses and all orders for this visit:    Primary osteoarthritis of right knee    Preop general physical exam -- she is scheduled for labs and EKG with Preadmission testing department at [facility] prior to surgery; provided all normal, she is considered acceptable risk for upcoming Right total knee replacement surgery with Spinal anesthesia and IV sedation. Ankylosing spondylitis ([facility] [address] -- she has discussed stopping her infusions prior to knee replacement surgery with her Rheumatologist.    Gastroesophageal reflux disease, esophagitis presence not specified  -     raNITIdine (ZANTAC) 150 mg tablet; Take 1 Tab by mouth two (2) times a day. Essential hypertension, benign --stable    Cough -- no signs of bacterial infection noted at this time; advised to contact office if cough worsens or she develops purulent mucous  -     benzonatate (TESSALON) 200 mg capsule; Take 1 Cap by mouth three (3) times daily as needed for Cough for up to 7 days.     Diabetes Type 2, controlled -- has been stable on Metformin    lab results and schedule of future lab studies reviewed with patient  reviewed diet, exercise and weight control  reviewed medications and side effects in detail

## 2017-04-11 NOTE — BRIEF OP NOTE
BRIEF OPERATIVE NOTE    Date of Procedure: 4/11/2017   Preoperative Diagnosis: OA RIGHT KNEE  Postoperative Diagnosis: OA RIGHT KNEE    Procedure(s):  RIGHT TOTAL KNEE ARTHROPLASTY   Surgeon(s) and Role:     * Gil Sewell MD - Primary            Surgical Staff:  Circ-1: Dulce Quiles RN  Circ-Relief: Rachell Ponce RN  Scrub Tech-1: Lila Cowan  Retractor Quiros: Amalia Dandy  Surg Asst-1: Elnoria Lobe  Surg Asst-Relief: Nan Velarde; Дмитрий Gregorio  Event Time In   Incision Start 1413   Incision Close 1550     Anesthesia: Spinal   Estimated Blood Loss: 100  Specimens: * No specimens in log *   Findings: severe djd   Complications: none  Implants:   Implant Name Type Inv.  Item Serial No.  Lot No. LRB No. Used Action   CEMENT BNE GENTAMC MV 40GM -- [employer] - [id number]  CEMENT BNE GENTAMC MV 40GM -- [employer] [id number] [facility] [employer] [id number] Right 2 Implanted   FEM PS SZ 5 RT ASHOK -- ATTUNE - SN/A  FEM PS SZ 5 RT ASHOK -- ATTUNE N/A [facility] [employer] [id number] Right 1 Implanted   BASE TIB FB SZ 3 ASHOK -- ATTUNE - SN/A  BASE TIB FB SZ 3 ASHOK -- ATTUNE N/A [facility] [employer] [id number] Right 1 Implanted   INSERT TIB FB PS SZ 5 7MM -- ATTUNE - SN/A  INSERT TIB FB PS SZ 5 7MM -- ATTUNE N/A [facility] [employer] [id number] Right 1 Implanted   PAT ASHOK DOME MEDIAL 35MM -- ATTUNE - SN/A   PAT ASHOK DOME MEDIAL 35MM -- ATTUNE N/A [facility] [employer] [id number] Right 1 Implanted

## 2017-04-11 NOTE — ANESTHESIA PREPROCEDURE EVALUATION
Anesthetic History   No history of anesthetic complications            Review of Systems / Medical History  Patient summary reviewed, nursing notes reviewed and pertinent labs reviewed    Pulmonary  Within defined limits          Asthma        Neuro/Psych   Within defined limits           Cardiovascular  Within defined limits  Hypertension                   GI/Hepatic/Renal  Within defined limits              Endo/Other  Within defined limits  Diabetes    Obesity     Other Findings              Physical Exam    Airway  Mallampati: II  TM Distance: > 6 cm  Neck ROM: normal range of motion   Mouth opening: Normal     Cardiovascular  Regular rate and rhythm,  S1 and S2 normal,  no murmur, click, rub, or gallop             Dental  No notable dental hx       Pulmonary  Breath sounds clear to auscultation               Abdominal  GI exam deferred       Other Findings            Anesthetic Plan    ASA: 2  Anesthesia type: spinal      Post-op pain plan if not by surgeon: peripheral nerve block single    Induction: Intravenous  Anesthetic plan and risks discussed with: Patient

## 2017-04-11 NOTE — INTERVAL H&P NOTE
H&P Update:  Magaly Gomez was seen and examined. History and physical has been reviewed. The patient has been examined. There have been no significant clinical changes since the completion of the originally dated History and Physical.    Severe osteoarthritis right knee with progressive symptoms despite comprehensive conservative treatment measures including NSAIDS, tylenol, corticosteroid injections, and activity modification. Proceed with Right total knee replacement.     Signed By: Ji Brewer MD     April 11, 2017 12:15 PM

## 2017-04-11 NOTE — PERIOP NOTES
1240: RT leg adductor canal nerve block done by Dr Guillaume Shetty using 20cc of 0.5, ropivicaine with US guidance, with pt monitored, O2 @ 2l/m/nc and IV sedation. Pt tolerated procedure well. 1241:VSS post nerve block: 707/68-72-53, SaO2=95% on 2l/m/nc. Arouses to verbal stimuli. See anesthesia record.

## 2017-04-11 NOTE — PROGRESS NOTES
TRANSFER - IN REPORT:    Verbal report received from Stephenie(name) on [facility]  being received from [place]) for routine post - op      Report consisted of patients Situation, Background, Assessment and   Recommendations(SBAR). Information from the following report(s) SBAR, Kardex, OR Summary, Procedure Summary, Intake/Output and Recent Results was reviewed with the receiving nurse. Opportunity for questions and clarification was provided. Assessment completed upon patients arrival to unit and care assumed.

## 2017-04-11 NOTE — PERIOP NOTES
TRANSFER - OUT REPORT:    Verbal report given to Chasidy (name) on Bisi Redmond  being transferred to  570 (unit) for routine post - op       Report consisted of patients Situation, Background, Assessment and   Recommendations(SBAR). Time Pre op antibiotic given:1330   Anesthesia Stop time: 0197  Maguire Present on Transfer to floor:yes  Order for Maguire on Chart:yes    Information from the following report(s) SBAR, Kardex, OR Summary, Intake/Output, MAR, Recent Results, Med Rec Status and Cardiac Rhythm SR was reviewed with the receiving nurse. Opportunity for questions and clarification was provided. Is the patient on 02? YES       L/Min 2L      Is the patient on a monitor? NO    Is the nurse transporting with the patient? NO    Surgical Waiting Area notified of patient's transfer from PACU?  YES      The following personal items collected during your admission accompanied patient upon transfer:   Dental Appliance: Dental Appliances: None  Vision:    Hearing Aid:    Jewelry:    Clothing: Clothing:  (clothing bag, glasses, cane to pacu)  Other Valuables:    Valuables sent to safe:

## 2017-04-11 NOTE — IP AVS SNAPSHOT
Current Discharge Medication List  
  
START taking these medications Dose & Instructions Dispensing Information Comments Morning Noon Evening Bedtime  
 aspirin delayed-release 325 mg tablet Your last dose was: Your next dose is:    
   
   
 Dose:  325 mg Take 1 Tab by mouth two (2) times a day. Quantity:  60 Tab Refills:  0  
     
   
   
   
  
 oxyCODONE IR 5 mg immediate release tablet Commonly known as:  Hernandez Lilly Your last dose was: Your next dose is:    
   
   
 Dose:  5-10 mg Take 1-2 Tabs by mouth every four (4) hours as needed for Pain. Max Daily Amount: 60 mg.  
 Quantity:  90 Tab Refills:  0 CONTINUE these medications which have CHANGED Dose & Instructions Dispensing Information Comments Morning Noon Evening Bedtime  
 atorvastatin 20 mg tablet Commonly known as:  LIPITOR What changed:  See the new instructions. Your last dose was: Your next dose is: TAKE 1 TABLET BY MOUTH EVERY EVENING Quantity:  30 Tab Refills:  5 CONTINUE these medications which have NOT CHANGED Dose & Instructions Dispensing Information Comments Morning Noon Evening Bedtime * albuterol 90 mcg/actuation inhaler Commonly known as:  PROVENTIL HFA, VENTOLIN HFA, PROAIR HFA Your last dose was: Your next dose is:    
   
   
 Dose:  1 Puff Take 1 Puff by inhalation every six (6) hours as needed. Refills:  0  
     
   
   
   
  
 * albuterol 90 mcg/actuation inhaler Commonly known as:  PROVENTIL HFA, VENTOLIN HFA, PROAIR HFA Your last dose was: Your next dose is:    
   
   
 Dose:  1 Puff Take 1 Puff by inhalation every six (6) hours as needed for Wheezing for up to 30 days. Quantity:  1 Inhaler Refills:  3  
     
   
   
   
  
 amLODIPine 5 mg tablet Commonly known as:  Opal Heymann Your last dose was: Your next dose is: TAKE 1 TABLET BY MOUTH DAILY Quantity:  90 Tab Refills:  0  
     
   
   
   
  
 fluticasone 50 mcg/actuation nasal spray Commonly known as:  Olivia Franklin Your last dose was: Your next dose is:    
   
   
 Dose:  2 Spray  
2 sprays by Both Nostrils route daily. Quantity:  1 Bottle Refills:  3  
     
   
   
   
  
 melatonin 5 mg Cap capsule Your last dose was: Your next dose is:    
   
   
 Dose:  5 mg Take 5 mg by mouth nightly. Refills:  0  
     
   
   
   
  
 metFORMIN  mg tablet Commonly known as:  GLUCOPHAGE XR Your last dose was: Your next dose is: TAKE 1 TABLET BY MOUTH EVERY DAY WITH DINNER Quantity:  30 Tab Refills:  6  
     
   
   
   
  
 montelukast 10 mg tablet Commonly known as:  SINGULAIR Your last dose was: Your next dose is: TAKE 1 TABLET BY MOUTH EVERY DAY Quantity:  30 Tab Refills:  11  
     
   
   
   
  
 olmesartan-hydroCHLOROthiazide 40-25 mg per tablet Commonly known as:  BENICAR HCT Your last dose was: Your next dose is:    
   
   
 Dose:  1 Tab Take 1 Tab by mouth daily. Quantity:  90 Tab Refills:  2  
     
   
   
   
  
 prednisoLONE acetate 1 % ophthalmic suspension Commonly known as:  PRED FORTE Your last dose was: Your next dose is:    
   
   
 Dose:  1 Drop Administer 1 Drop to both eyes as needed. Refills:  0  
     
   
   
   
  
 raNITIdine 150 mg tablet Commonly known as:  ZANTAC Your last dose was: Your next dose is:    
   
   
 Dose:  150 mg Take 1 Tab by mouth two (2) times a day. Quantity:  30 Tab Refills:  0  
     
   
   
   
  
 sulfaSALAzine 500 mg tablet Commonly known as:  AZULFIDINE Your last dose was: Your next dose is:    
   
   
 Dose:  1000 mg Take 1,000 mg by mouth two (2) times a day. Refills:  0 * Notice: This list has 2 medication(s) that are the same as other medications prescribed for you. Read the directions carefully, and ask your doctor or other care provider to review them with you. STOP taking these medications HYDROcodone-acetaminophen 5-325 mg per tablet Commonly known as:  Queenie POST IV Where to Get Your Medications Information on where to get these meds will be given to you by the nurse or doctor. ! Ask your nurse or doctor about these medications  
  aspirin delayed-release 325 mg tablet  
 oxyCODONE IR 5 mg immediate release tablet

## 2017-04-12 PROBLEM — E66.9 OBESITY (BMI 30.0-34.9): Status: ACTIVE | Noted: 2017-04-11

## 2017-04-12 LAB
ANION GAP BLD CALC-SCNC: 10 MMOL/L (ref 5–15)
BUN SERPL-MCNC: 10 MG/DL (ref 6–20)
BUN/CREAT SERPL: 11 (ref 12–20)
CALCIUM SERPL-MCNC: 8 MG/DL (ref 8.5–10.1)
CHLORIDE SERPL-SCNC: 102 MMOL/L (ref 97–108)
CO2 SERPL-SCNC: 25 MMOL/L (ref 21–32)
CREAT SERPL-MCNC: 0.91 MG/DL (ref 0.55–1.02)
GLUCOSE BLD STRIP.AUTO-MCNC: 149 MG/DL (ref 65–100)
GLUCOSE BLD STRIP.AUTO-MCNC: 164 MG/DL (ref 65–100)
GLUCOSE BLD STRIP.AUTO-MCNC: 226 MG/DL (ref 65–100)
GLUCOSE BLD STRIP.AUTO-MCNC: 233 MG/DL (ref 65–100)
GLUCOSE SERPL-MCNC: 235 MG/DL (ref 65–100)
HGB BLD-MCNC: 10.9 G/DL (ref 11.5–16)
POTASSIUM SERPL-SCNC: 4.1 MMOL/L (ref 3.5–5.1)
SERVICE CMNT-IMP: ABNORMAL
SODIUM SERPL-SCNC: 137 MMOL/L (ref 136–145)

## 2017-04-12 PROCEDURE — 85018 HEMOGLOBIN: CPT | Performed by: ORTHOPAEDIC SURGERY

## 2017-04-12 PROCEDURE — 65270000029 HC RM PRIVATE

## 2017-04-12 PROCEDURE — 36415 COLL VENOUS BLD VENIPUNCTURE: CPT | Performed by: ORTHOPAEDIC SURGERY

## 2017-04-12 PROCEDURE — 97161 PT EVAL LOW COMPLEX 20 MIN: CPT

## 2017-04-12 PROCEDURE — 74011636637 HC RX REV CODE- 636/637: Performed by: ORTHOPAEDIC SURGERY

## 2017-04-12 PROCEDURE — 74011636637 HC RX REV CODE- 636/637: Performed by: NURSE PRACTITIONER

## 2017-04-12 PROCEDURE — 82962 GLUCOSE BLOOD TEST: CPT

## 2017-04-12 PROCEDURE — 74011250637 HC RX REV CODE- 250/637: Performed by: ORTHOPAEDIC SURGERY

## 2017-04-12 PROCEDURE — 97116 GAIT TRAINING THERAPY: CPT

## 2017-04-12 PROCEDURE — 80048 BASIC METABOLIC PNL TOTAL CA: CPT | Performed by: ORTHOPAEDIC SURGERY

## 2017-04-12 PROCEDURE — 74011250636 HC RX REV CODE- 250/636: Performed by: ORTHOPAEDIC SURGERY

## 2017-04-12 RX ORDER — INSULIN LISPRO 100 [IU]/ML
INJECTION, SOLUTION INTRAVENOUS; SUBCUTANEOUS
Status: DISCONTINUED | OUTPATIENT
Start: 2017-04-12 | End: 2017-04-13 | Stop reason: HOSPADM

## 2017-04-12 RX ADMIN — INSULIN LISPRO 3 UNITS: 100 INJECTION, SOLUTION INTRAVENOUS; SUBCUTANEOUS at 12:15

## 2017-04-12 RX ADMIN — ACETAMINOPHEN 500 MG: 500 TABLET, FILM COATED ORAL at 09:18

## 2017-04-12 RX ADMIN — Medication 10 ML: at 22:02

## 2017-04-12 RX ADMIN — SULFASALAZINE 1000 MG: 500 TABLET ORAL at 09:18

## 2017-04-12 RX ADMIN — ASPIRIN 325 MG: 325 TABLET, DELAYED RELEASE ORAL at 09:18

## 2017-04-12 RX ADMIN — CEFAZOLIN 2 G: 1 INJECTION, POWDER, FOR SOLUTION INTRAMUSCULAR; INTRAVENOUS; PARENTERAL at 05:24

## 2017-04-12 RX ADMIN — OXYCODONE HYDROCHLORIDE 10 MG: 5 TABLET ORAL at 16:50

## 2017-04-12 RX ADMIN — DOCUSATE SODIUM AND SENNOSIDES 1 TABLET: 8.6; 5 TABLET, FILM COATED ORAL at 09:18

## 2017-04-12 RX ADMIN — POLYETHYLENE GLYCOL 3350 17 G: 17 POWDER, FOR SOLUTION ORAL at 09:18

## 2017-04-12 RX ADMIN — FAMOTIDINE 20 MG: 20 TABLET ORAL at 09:18

## 2017-04-12 RX ADMIN — ACETAMINOPHEN 500 MG: 500 TABLET, FILM COATED ORAL at 05:23

## 2017-04-12 RX ADMIN — OXYCODONE HYDROCHLORIDE 5 MG: 5 TABLET ORAL at 03:09

## 2017-04-12 RX ADMIN — ACETAMINOPHEN 500 MG: 500 TABLET, FILM COATED ORAL at 17:32

## 2017-04-12 RX ADMIN — OXYCODONE HYDROCHLORIDE 10 MG: 5 TABLET ORAL at 20:01

## 2017-04-12 RX ADMIN — HUMAN INSULIN 3 UNITS: 100 INJECTION, SOLUTION SUBCUTANEOUS at 06:53

## 2017-04-12 RX ADMIN — Medication 10 ML: at 05:24

## 2017-04-12 RX ADMIN — FAMOTIDINE 20 MG: 20 TABLET ORAL at 17:32

## 2017-04-12 RX ADMIN — ASPIRIN 325 MG: 325 TABLET, DELAYED RELEASE ORAL at 17:32

## 2017-04-12 RX ADMIN — MONTELUKAST SODIUM 10 MG: 10 TABLET, FILM COATED ORAL at 22:02

## 2017-04-12 RX ADMIN — AMLODIPINE BESYLATE 5 MG: 5 TABLET ORAL at 09:18

## 2017-04-12 RX ADMIN — SULFASALAZINE 1000 MG: 500 TABLET ORAL at 17:31

## 2017-04-12 RX ADMIN — ACETAMINOPHEN 500 MG: 500 TABLET, FILM COATED ORAL at 22:02

## 2017-04-12 RX ADMIN — OXYCODONE HYDROCHLORIDE 5 MG: 5 TABLET ORAL at 10:52

## 2017-04-12 RX ADMIN — ATORVASTATIN CALCIUM 10 MG: 10 TABLET, FILM COATED ORAL at 22:02

## 2017-04-12 RX ADMIN — Medication 10 ML: at 13:43

## 2017-04-12 RX ADMIN — DOCUSATE SODIUM AND SENNOSIDES 1 TABLET: 8.6; 5 TABLET, FILM COATED ORAL at 17:31

## 2017-04-12 RX ADMIN — INSULIN LISPRO 2 UNITS: 100 INJECTION, SOLUTION INTRAVENOUS; SUBCUTANEOUS at 17:31

## 2017-04-12 RX ADMIN — OXYCODONE HYDROCHLORIDE 5 MG: 5 TABLET ORAL at 09:18

## 2017-04-12 RX ADMIN — OXYCODONE HYDROCHLORIDE 10 MG: 5 TABLET ORAL at 13:43

## 2017-04-12 RX ADMIN — ACETAMINOPHEN 500 MG: 500 TABLET, FILM COATED ORAL at 13:43

## 2017-04-12 NOTE — OP NOTES
[address]   [address], [address]   OP NOTE       Name:  Kylie Grover   MR#:  [id number]   :  1950   Account #:  [de-identified]    Surgery Date:  2017   Date of Adm:  2017       SURGEON: Lucia Anders MD    FIRST ASSISTANT: Fernandez Ayala    PREOPERATIVE DIAGNOSIS: Osteoarthritis of right knee. POSTOPERATIVE DIAGNOSIS: Osteoarthritis of right knee. PROCEDURES PERFORMED: Right total knee arthroplasty. COMPONENTS IMPLANTED: DePuy Attune size 5 posterior stabilized   femur, size 3 tibial tray with 7-mm posterior stabilized polyethylene   insert, and 35 mm patella. ANESTHESIA: Spinal with sedation as well as adductor canal block. COMPLICATIONS: None. ESTIMATED BLOOD LOSS: 100 mL. SPECIMENS REMOVED: None. INDICATIONS: The patient is a 27-year-old female with progressive   debilitating right knee pain due to severe tricompartmental arthritis. Symptoms have progressed despite comprehensive conservative   treatment, and she presents for right total knee replacement. Risks,   benefits, alternatives of the procedure were reviewed with her in detail   and she desires to proceed. DESCRIPTION OF PROCEDURE: The patient was taken to the   operating room where the anesthesia team placed a spinal.   Preoperative IV antibiotics were administered. Maguire catheter was   inserted and a padded pneumatic tourniquet was placed around the   right upper thigh. Right lower extremity was prepped and draped in the   usual sterile fashion. Tourniquet was inflated to 275. Through a midline anterior knee incision, I performed a medial   parapatellar arthrotomy. A very small medial release was performed to   facilitate exposure. I took 10 mm off the distal femur using a 5-degree   distal femoral cutting block over a long intramedullary mickey. The femur   was sized to a 5, prepared for a size 5 posterior stabilized component   utilizing appropriate jigs. Rotational alignment was gauged off of   Whitesides line as well as the transepicondylar axis. This required a   few extra degrees of external rotation due to significant posterior wear   on the lateral femoral condyle. Neutral proximal tibial resection was   performed using an extramedullary alignment guide. Menisci were   excised and posterior osteophytes removed from the femur. Subperiosteal posterior capsular release was performed. No additional   medial release was required to produce a symmetrical extension gap   with a 5-mm spacer block. The flexion gap was also symmetrical, but   with 7-mm I resected an additional 2 mm off the distal femur and trials   were inserted. With the 7-mm trial insert in place, the knee had full   extension to gravity, satisfactory coronal plane, stability and soft tissue   tension throughout arc of motion. The patella was prepared for a 35   mm component and tracked centrally using no-thumbs technique. Trials were removed and bony surfaces were copiously irrigated by   pulse lavage and dried before the real components were cemented   into place using antibiotic-impregnated cement. Excess cement was   removed and the knee was reduced in full extension with the real insert   in place during cement setup. Periarticular soft tissues were injected   with a solution containing Exparel, 0.5% Marcaine with epinephrine, as   well as morphine and Toradol. Tourniquet was released and   hemostasis obtained with the Bovie. Arthrotomy was closed with a   combination of heavy Vicryl sutures and a running #2 Stratafix suture. Skin and subcutaneous layers were closed in layered fashion with   Vicryl and a running Monocryl subcuticular stitch. The wound was   dressed with Dermabond and an Aquacel occlusive dressing as well as   a sterile compressive dressing. The patient was transported to the   postanesthesia care unit in stable condition.  All counts were correct at   the end of the procedure.         Marvin Heredia MD      [facility] JASWINDER / MARITZA   D:  04/11/2017   23:33   T:  04/12/2017   01:26   Job #:  [id number]

## 2017-04-12 NOTE — DIABETES MGMT
DTC Consult Note    Recommendations/ Comments: PO day 1 TKA. BG's 130-140 initially. Results > 200 since receiving dexamethasone yesterday. A1c is 6.7    If appropriate, please consider   Resume home medication Metformin 500 mg daily  If BG remains > 200 add Amaryl 1mg daily    Consult received for:  [x]             Assessment of home management                [x]      Medication Recommendations                    Chart reviewed and initial evaluation complete on [facility]. Patient is a 77 y.o. female with known DM on Metfomrin 500mg daily. BG monitoring at home - [facility] stopped testing and just goes by what her a1c is. Assessed and instructed patient on the following:   · interpretation of lab results - A1c 6.7  · blood sugar goals and importance of excellent control post-op  · complications of diabetes mellitus including risk of infection post op,    · exercise,   · need for insulin correction scale   · Effect of surgery, pain, steroids and stress on BG's   · SMBG - she has  ameter and knows how to use it. Instructed her to test BG's 2x/day at home,   · Nutrition. She avoids sweet drinks and sweet foods. Both she and her  have DM. They attended DTC class. They have recipe books which they use. and   · referred to Diabetes Educator. She attended class at the DTC in 3/2006. Informed of availability of 1:1 sessions    Encouraged the following:   · Test BG 2x/day at home     Provided patient with the following: [x]             Survival skills education materials               []             Insulin education materials               []             CHO counting education materials               [x]             Outpatient DTC contact number               []             Glucometer               Discussed with patient and/or family need for follow up appointment for diabetes management after discharge.       A1c:   Lab Results   Component Value Date/Time    Hemoglobin A1c 6.7 03/27/2017 09:01 AM Recent Glucose Results:   Lab Results   Component Value Date/Time     (H) 04/12/2017 03:10 AM    GLUCPOC 233 (H) 04/12/2017 11:57 AM    GLUCPOC 226 (H) 04/12/2017 06:23 AM    GLUCPOC 283 (H) 04/11/2017 09:15 PM        Lab Results   Component Value Date/Time    Creatinine 0.91 04/12/2017 03:10 AM       Active Orders   Diet    DIET DIABETIC WITH OPTIONS Consistent Carb 1800kcal; Regular; No Conc. Sweets        PO intake: No data found. Current hospital DM medication: lispro normal correction scale  Will continue to follow as needed. Thank you.   Anthony Downey RN, CDE

## 2017-04-12 NOTE — PROGRESS NOTES
Pain controlled. No cp/sob.  No n/v.    Visit Vitals    /75 (BP 1 Location: Right arm, BP Patient Position: At rest)    Pulse 80    Temp 97.7 °F (36.5 °C)    Resp 16    Ht 5' 2\" (1.575 m)    Wt 84.8 kg (186 lb 15.2 oz)    SpO2 95%    BMI 34.19 kg/m2       R knee dressing dry  Calves soft NT  Motor 5/5  Pulses symmetrical  Recent Results (from the past 12 hour(s))   GLUCOSE, POC    Collection Time: 04/11/17  9:15 PM   Result Value Ref Range    Glucose (POC) 283 (H) 65 - 100 mg/dL    Performed by JASPER HOLLAND    METABOLIC PANEL, BASIC    Collection Time: 04/12/17  3:10 AM   Result Value Ref Range    Sodium 137 136 - 145 mmol/L    Potassium 4.1 3.5 - 5.1 mmol/L    Chloride 102 97 - 108 mmol/L    CO2 25 21 - 32 mmol/L    Anion gap 10 5 - 15 mmol/L    Glucose 235 (H) 65 - 100 mg/dL    BUN 10 6 - 20 MG/DL    Creatinine 0.91 0.55 - 1.02 MG/DL    BUN/Creatinine ratio 11 (L) 12 - 20      GFR est AA >60 >60 ml/min/1.73m2    GFR est non-AA >60 >60 ml/min/1.73m2    Calcium 8.0 (L) 8.5 - 10.1 MG/DL   HEMOGLOBIN    Collection Time: 04/12/17  3:10 AM   Result Value Ref Range    HGB 10.9 (L) 11.5 - 16.0 g/dL   GLUCOSE, POC    Collection Time: 04/12/17  6:23 AM   Result Value Ref Range    Glucose (POC) 226 (H) 65 - 100 mg/dL    Performed by THEE IWLL          POD 1 R TKA; acute postop blood loss anemia (expected)  -PT  -pain control  -ASA  -disch planning - home today vs tomorrow  -no Remicaid until after f/u with me    Lilli Poole MD

## 2017-04-12 NOTE — PROGRESS NOTES
Problem: Mobility Impaired (Adult and Pediatric)  Goal: *Acute Goals and Plan of Care (Insert Text)  Physical Therapy Goals  Initiated 4/12/2017    1. Patient will move from supine to sit and sit to supine in bed with modified independence within 4 days. 2. Patient will perform sit to stand with modified independence within 4 days. 3. Patient will ambulate with modified independence for 250 feet with the least restrictive device within 4 days. 4. Patient will ascend/descend 4 stairs with use of handrail(s) with modified independence within 4 days. 5. Patient will perform home exercise program per protocol with independence within 4 days. 6. Patient will demonstrate AROM 0-90 degrees in operative joint within 4 days. PHYSICAL THERAPY EVALUATION  Patient: Dana Oliveira (38 y.o. female)  Date: 4/12/2017  Primary Diagnosis: OA RIGHT KNEE  Primary osteoarthritis of right knee  Procedure(s) (LRB):  RIGHT TOTAL KNEE ARTHROPLASTY  (Right) 1 Day Post-Op   Precautions:   Fall, WBAT      ASSESSMENT :  Based on the objective data described below, the patient presents with decreased R knee ROM/strength and decreased functional independence compared to her baseline. She was able to perform knee exercises followed by ambulate 100 ft with RW, supervision for safety. Returned to chair at end of session and ice applied. Pending her progression today and clearance on stairs, pt to discharge home with HHPT today vs tomorrow. Pt has all equipment needs at this time. Patient will benefit from skilled intervention to address the above impairments.   Patients rehabilitation potential is considered to be Good  Factors which may influence rehabilitation potential include:   [X]         None noted  [ ]         Mental ability/status  [ ]         Medical condition  [ ]         Home/family situation and support systems  [ ]         Safety awareness  [ ]         Pain tolerance/management  [ ]         Other:        PLAN :  Recommendations and Planned Interventions:  [X]           Bed Mobility Training             [ ]    Neuromuscular Re-Education  [X]           Transfer Training                   [ ]    Orthotic/Prosthetic Training  [X]           Gait Training                         [ ]    Modalities  [X]           Therapeutic Exercises           [ ]    Edema Management/Control  [X]           Therapeutic Activities            [X]    Patient and Family Training/Education  [ ]           Other (comment):     Frequency/Duration: Patient will be followed by physical therapy  twice daily to address goals.   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.      OBJECTIVE DATA SUMMARY:   HISTORY:    Past Medical History:   Diagnosis Date    Ankylosing spondylitis ([facility] [address] 2/8/2012    Asthma      Chronic pain      Diabetes ([facility] [address]      Essential hypertension, benign      First degree atrioventricular block      GERD (gastroesophageal reflux disease)      Hypercholesteremia      Iritis 1/15/2009    PUD (peptic ulcer disease)       HX ULCERS 1970'S    S/P partial hysterectomy      Sacroiliitis ([address] 9/15/2010    Seizures ([facility] [address] CHILD     HX SEIZURES CHILD (NOT CURRENT PROBLEM)    Spinal stenosis, lumbar 5/29/14     mri tamiko at l4-l5    TMJ (dislocation of temporomandibular joint) 9/4/2009    Unspecified adverse effect of anesthesia       woke up durning  cataract and hysterectomy      Past Surgical History:   Procedure Laterality Date    ENDOSCOPY, COLON, DIAGNOSTIC   1/01/07     negative     HX CATARACT REMOVAL   1992     bilateral  / lens implant    HX GYN   9/28/13     bilat oophorectomy    HX HYSTERECTOMY   2008    HX KNEE ARTHROSCOPY   2003     right    HX ORTHOPAEDIC   8/6/15     LUMBAR LAMINECTOMY L3-4 dr Wilnette Nyhan     Prior Level of Function/Home Situation: independent  Personal factors and/or comorbidities impacting plan of care:      Home Situation  Home Environment: Private residence  # Steps to Enter: 0  One/Two Story Residence: One story  Living Alone: No  Support Systems: Spouse/Significant Other/Partner  Patient Expects to be Discharged to[de-identified] Private residence  Current DME Used/Available at Home: [employer], straight, Walker, rolling     EXAMINATION/PRESENTATION/DECISION MAKING:   Critical Behavior:  Neurologic State: Alert  Orientation Level: Oriented X4  Cognition: Appropriate decision making, Appropriate for age attention/concentration, Follows commands  Safety/Judgement: Awareness of environment  Hearing: Auditory  Auditory Impairment: None  Skin:  Appears intact     Range Of Motion:  AROM: Generally decreased, functional                       Strength:    Strength: Generally decreased, functional                    Tone & Sensation:   Tone: Normal              Sensation: Intact               Coordination:  Coordination: Within functional limits  Functional Mobility:  Bed Mobility:     Supine to Sit: Modified independent        Transfers:  Sit to Stand: Supervision  Stand to Sit: Supervision                       Balance:   Sitting: Intact  Standing: Intact; With support  Ambulation/Gait Training:  Distance (ft): 100 Feet (ft)  Assistive Device: Walker, rolling;Gait belt  Ambulation - Level of Assistance: Stand-by asssistance     Gait Description (WDL): Exceptions to WDL  Gait Abnormalities: Antalgic  Right Side Weight Bearing: As tolerated                    Therapeutic Exercises:   Protocol knee exercises x 10 reps each      Functional Measure:  Tinetti test:      Sitting Balance: 1  Arises: 1  Attempts to Rise: 2  Immediate Standing Balance: 1  Standing Balance: 1  Nudged: 2  Eyes Closed: 1  Turn 360 Degrees - Continuous/Discontinuous: 1  Turn 360 Degrees - Steady/Unsteady: 1  Sitting Down: 1  Balance Score: 12  Indication of Gait: 1  R Step Length/Height: 1  L Step Length/Height: 1  R Foot Clearance: 1  L Foot Clearance: 1  Step Symmetry: 1  Step Continuity: 1  Path: 1  Trunk: 0  Walking Time: 1  Gait Score: 9  Total Score: 21         Tinetti Test and G-code impairment scale:  Percentage of Impairment CH     0%    CI     1-19% CJ     20-39% CK     40-59% CL     60-79% CM     80-99% CN      100%   Tinetti  Score 0-28 28 23-27 17-22 12-16 6-11 1-5 0          Tinetti Tool Score Risk of Falls  <19 = High Fall Risk  19-24 = Moderate Fall Risk  25-28 = Low Fall Risk  Tinetti ME. Performance-Oriented Assessment of Mobility Problems in Elderly Patients. [address]; [id number]. (Scoring Description: PT Bulletin Feb. 10, 1993)     Older adults: Genevieve Underwood et al, 2009; n = 1000 [facility] elderly evaluated with ABC, ELIZABETH, ADL, and IADL)  · Mean ELIZABETH score for males aged 69-68 years = 26.21(3.40)  · Mean ELIZABETH score for females age 69-68 years = 25.16(4.30)  · Mean ELIZABETH score for males over 80 years = 23.29(6.02)  · Mean ELIZABETH score for females over 80 years = 17.20(8.32)         G codes: In compliance with CMSs Claims Based Outcome Reporting, the following G-code set was chosen for this patient based on their primary functional limitation being treated: The outcome measure chosen to determine the severity of the functional limitation was the Tinetti with a score of 21/28 which was correlated with the impairment scale.       · Mobility - Walking and Moving Around:               - CURRENT STATUS:    CJ - 20%-39% impaired, limited or restricted               - GOAL STATUS:           CI - 1%-19% impaired, limited or restricted               - D/C STATUS:                       ---------------To be determined---------------      Physical Therapy Evaluation Charge Determination   History Examination Presentation Decision-Making   MEDIUM  Complexity : 1-2 comorbidities / personal factors will impact the outcome/ POC  MEDIUM Complexity : 3 Standardized tests and measures addressing body structure, function, activity limitation and / or participation in recreation  LOW Complexity : Stable, uncomplicated  Other outcome measures tinetti  LOW       Based on the above components, the patient evaluation is determined to be of the following complexity level: LOW      Pain:  Pain Scale 1: Numeric (0 - 10)  Pain Intensity 1: 7  Pain Location 1: Knee  Pain Orientation 1: Right  Pain Description 1: Aching  Pain Intervention(s) 1: Medication (see MAR)  Activity Tolerance:   No apparent distress   Please refer to the flowsheet for vital signs taken during this treatment. After treatment:   [X]         Patient left in no apparent distress sitting up in chair  [ ]         Patient left in no apparent distress in bed  [X]         Call bell left within reach  [X]         Nursing notified  [X]         Caregiver present  [ ]         Bed alarm activated      COMMUNICATION/EDUCATION:   The patients plan of care was discussed with: Registered Nurse.  [X]         Fall prevention education was provided and the patient/caregiver indicated understanding. [X]         Patient/family have participated as able in goal setting and plan of care. [X]         Patient/family agree to work toward stated goals and plan of care. [ ]         Patient understands intent and goals of therapy, but is neutral about his/her participation. [ ]         Patient is unable to participate in goal setting and plan of care.      Thank you for this referral.  Arianna Mac, PT   Time Calculation: 24 mins

## 2017-04-12 NOTE — PROGRESS NOTES
Spiritual Care Partner Volunteer visited patient in [address] [place] [address] [address] on 4/12/17. Documented by:  Piter Zhang M.Div.    Paging Service [phone])

## 2017-04-12 NOTE — PROGRESS NOTES
RN and PCT assisted patient into sitting then standing position. Patient able to take several side steps. Patient returned to bed after.

## 2017-04-12 NOTE — PROGRESS NOTES
Care Management Interventions  PCP Verified by CM: Yes  Mode of Transport at Discharge:  (Personal vehicle)  Transition of Care Consult (CM Consult): [address]: No  Reason Outside [place]: Unable to staff case  Dustin Rack: No  Discharge Durable Medical Equipment: No  Physical Therapy Consult: Yes  Occupational Therapy Consult: Yes  Speech Therapy Consult: No  Current Support Network: Lives with Spouse  Confirm Follow Up Transport: Family  Plan discussed with Pt/Family/Caregiver: Yes  Freedom of Choice Offered: Yes  Discharge Location  Discharge Placement:  (Home)     Writer met with patient and her  for introduction to self and role. Demographics including PCP verified to be correct. Freedom of choice offered, no preference indicated. [facility]. No barriers to care and needs identified at this time.   Shweta Arreola LCSW, CRM

## 2017-04-12 NOTE — PROGRESS NOTES
Problem: Mobility Impaired (Adult and Pediatric)  Goal: *Acute Goals and Plan of Care (Insert Text)  Physical Therapy Goals  Initiated 4/12/2017    1. Patient will move from supine to sit and sit to supine in bed with modified independence within 4 days. 2. Patient will perform sit to stand with modified independence within 4 days. 3. Patient will ambulate with modified independence for 250 feet with the least restrictive device within 4 days. 4. Patient will ascend/descend 4 stairs with use of handrail(s) with modified independence within 4 days. 5. Patient will perform home exercise program per protocol with independence within 4 days. 6. Patient will demonstrate AROM 0-90 degrees in operative joint within 4 days. PHYSICAL THERAPY TREATMENT  Patient: Mary Anders (89 y.o. female)  Date: 4/12/2017  Diagnosis: OA RIGHT KNEE  Primary osteoarthritis of right knee Primary osteoarthritis of right knee  Procedure(s) (LRB):  RIGHT TOTAL KNEE ARTHROPLASTY  (Right) 1 Day Post-Op  Precautions: Fall, WBAT      ASSESSMENT:  Pt resting in bed and agreeable to progress her ambulation this afternoon despite increased pain. She was able to ambulate 150 ft with RW, supervision for safety. Pt demonstrates good step through gait mechanics. She plans to stay tonight and attempt stairs tomorrow morning in preparation for discharge home with  and HHPT. Progression toward goals:  [X]      Improving appropriately and progressing toward goals  [ ]      Improving slowly and progressing toward goals  [ ]      Not making progress toward goals and plan of care will be adjusted       PLAN:  Patient continues to benefit from skilled intervention to address the above impairments. Continue treatment per established plan of care.   Discharge Recommendations:  Home Health  Further Equipment Recommendations for Discharge:  none       SUBJECTIVE:   Everyone keeps coming in to look at that bird's nest      OBJECTIVE DATA SUMMARY: Range of Motion:  AROM: Generally decreased, functional                       Functional Mobility Training:  Bed Mobility:     Supine to Sit: Modified independent              Transfers:  Sit to Stand: Supervision  Stand to Sit: Supervision                             Ambulation/Gait Training:  Distance (ft): 150 Feet (ft)  Assistive Device: Walker, rolling;Gait belt  Ambulation - Level of Assistance: Supervision     Gait Description (WDL): Exceptions to WDL  Gait Abnormalities: Antalgic  Right Side Weight Bearing: As tolerated                 Therapeutic Exercises:   Exercises performed per protocol. See morning treatment note for description. Pain:  Pain Scale 1: Numeric (0 - 10)  Pain Intensity 1: 7  Pain Location 1: Knee  Pain Orientation 1: Right  Pain Description 1: Aching  Pain Intervention(s) 1: Medication (see MAR)  Activity Tolerance:   No apparent distress   Please refer to the flowsheet for vital signs taken during this treatment.   After treatment:   [ ] Patient left in no apparent distress sitting up in chair  [X] Patient left in no apparent distress in bed  [X] Call bell left within reach  [X] Nursing notified  [ ] Caregiver present  [ ] Bed alarm activated      COMMUNICATION/COLLABORATION:   The patients plan of care was discussed with: Registered Nurse     Maya Mac, PT   Time Calculation: 14 mins

## 2017-04-12 NOTE — PROGRESS NOTES
NP ORTHO Addendum to PROGRESS NOTE  Post Op day: 1 Day Post-Op    April 12, 2017 8:37 AM     Rosibel Gilbert  [id number]  female  77 y.o.   1950    Admit date: 4/11/2017  Date of Surgery: 4/11/2017   Procedures: Procedure(s):  RIGHT TOTAL KNEE ARTHROPLASTY   Admitting Physician: Lew Hunter MD   Surgeon: Cindy Begum) and Role:     * Lew Hunter MD - Primary    Chart/Meds/Labs Reviewed  Current Facility-Administered Medications   Medication Dose Route Frequency    insulin lispro (HUMALOG) injection   SubCUTAneous AC&HS    losartan/hydroCHLOROthiazide (HYZAAR) 100/25 mg   Oral DAILY    sodium chloride 0.9 % bolus infusion 500 mL  500 mL IntraVENous ONCE PRN    albuterol (PROVENTIL VENTOLIN) nebulizer solution 2.5 mg  2.5 mg Nebulization Q6H PRN    amLODIPine (NORVASC) tablet 5 mg  5 mg Oral DAILY    atorvastatin (LIPITOR) tablet 10 mg  10 mg Oral QHS    fluticasone (FLONASE) 50 mcg/actuation nasal spray 2 Spray  2 Spray Both Nostrils DAILY    montelukast (SINGULAIR) tablet 10 mg  10 mg Oral QHS    famotidine (PEPCID) tablet 20 mg  20 mg Oral BID    sulfaSALAzine (AZULFIDINE) tablet 1,000 mg  1,000 mg Oral BID    0.9% sodium chloride infusion  125 mL/hr IntraVENous CONTINUOUS    sodium chloride (NS) flush 5-10 mL  5-10 mL IntraVENous Q8H    sodium chloride (NS) flush 5-10 mL  5-10 mL IntraVENous PRN    acetaminophen (TYLENOL) tablet 500 mg  500 mg Oral Q4HWA    oxyCODONE IR (ROXICODONE) tablet 5 mg  5 mg Oral Q3H PRN    oxyCODONE IR (ROXICODONE) tablet 10 mg  10 mg Oral Q3H PRN    HYDROmorphone (PF) (DILAUDID) injection 0.5 mg  0.5 mg IntraVENous Q4H PRN    naloxone (NARCAN) injection 0.4 mg  0.4 mg IntraVENous PRN    ondansetron (ZOFRAN) injection 4 mg  4 mg IntraVENous Q4H PRN    hydrOXYzine HCl (ATARAX) tablet 10 mg  10 mg Oral Q8H PRN    senna-docusate (PERICOLACE) 8.6-50 mg per tablet 1 Tab  1 Tab Oral BID    polyethylene glycol (MIRALAX) packet 17 g  17 g Oral DAILY    [START ON 4/13/2017] bisacodyl (DULCOLAX) suppository 10 mg  10 mg Rectal DAILY PRN    aspirin delayed-release tablet 325 mg  325 mg Oral BID    glucose chewable tablet 16 g  4 Tab Oral PRN    dextrose (D50W) injection syrg 12.5-25 g  12.5-25 g IntraVENous PRN    glucagon (GLUCAGEN) injection 1 mg  1 mg IntraMUSCular PRN       Subjective:    Complaints: None  Denies Dizziness, CP, SOB, N/V, Abdominal pain, numbness or tingling of extremities. Able to perform ankle pumps easily. Progressing with mobility. Took some steps with RN last night but not seen yet by PT--expect home with [facility] by tomorrow. Incisional pain control: well controlled. Norco 5  Taken occasionally (`once every 3 days) at home for back pain & knee pain  Pain Control:   Pain Assessment  Pain Scale 1: Numeric (0 - 10)  Pain Intensity 1: 4  Pain Onset 1: post op  Pain Location 1: Knee  Pain Orientation 1: Right  Pain Description 1: Aching  Pain Intervention(s) 1: Medication (see MAR)    Oral diet: Tolerating diabetic diet well    Objective:  General: Alert,Ox4, cooperative, NAD  HEENT: Atraumatic, PERRL, anicteric sclerae  Lungs: Bilateral expansion. Equal excursion. No accessory muscle use. Gastrointestinal:  Soft, non-tender, non-distended, obese  Extremities:  Neurovasc exam WDL. + DP pulses. Sensation intact to light touch. Motor: + DF/PF          Calves non-tender upon palpation or with passive stretch. No significant erythema or swelling. Bulky ace wrapped Dressing: clean, dry and intact.   NS infusing via peripheral IV  Vital Signs:   Visit Vitals    /78 (BP 1 Location: Right arm, BP Patient Position: Sitting)    Pulse 77    Temp 97.9 °F (36.6 °C)    Resp 16    Ht 5' 2\" (1.575 m)    Wt 84.8 kg (186 lb 15.2 oz)    SpO2 94%    BMI 34.19 kg/m2    O2 Flow Rate (L/min): 2 l/min O2 Device: Nasal cannula   Patient Vitals for the past 24 hrs:   BP Temp Pulse Resp SpO2 Height Weight   04/12/17 0831 136/78 97.9 °F (36.6 °C) 77 16 94 % - -   17 0304 114/75 97.7 °F (36.5 °C) 80 16 95 % - -   17 2341 139/73 97.8 °F (36.6 °C) 93 16 94 % - -   17 2246 143/78 98 °F (36.7 °C) 83 16 95 % - -   17 2140 157/85 98.4 °F (36.9 °C) (!) 102 16 95 % - -   17 (!) 144/91 98.5 °F (36.9 °C) 100 15 94 % - -   17 1935 160/88 98.8 °F (37.1 °C) 96 15 94 % - -   17 1915 138/84 - 85 13 96 % - -   17 1900 - - 89 13 96 % - -   17 1845 142/84 - 90 18 95 % - -   17 1830 - - 92 16 96 % - -   17 1815 123/54 - 89 26 96 % - -   17 1800 138/66 - 85 11 96 % - -   17 1745 153/78 - 81 10 95 % - -   17 1730 141/87 - 81 14 97 % - -   17 1715 139/80 - 82 15 96 % - -   17 1700 151/79 - 79 11 95 % - -   17 1645 142/75 - 82 17 100 % - -   17 1630 156/77 - 82 15 100 % - -   17 1615 141/83 - 82 10 99 % - -   17 1610 141/55 - 84 10 99 % - -   17 1605 138/80 97.8 °F (36.6 °C) 84 14 98 % - -   17 1604 132/72 97.8 °F (36.6 °C) 86 19 98 % - -   17 1600 132/72 - - - 95 % - -   17 1043 151/88 98.3 °F (36.8 °C) 92 16 97 % 5' 2\" (1.575 m) 84.8 kg (186 lb 15.2 oz)     Temp (24hrs), Av.1 °F (36.7 °C), Min:97.7 °F (36.5 °C), Max:98.8 °F (37.1 °C)      Intake/output-last 8 hours:        Intake/output- 24 hours:  04/10 1901 -  0700  In: 1800 [I.V.:1800]  Out: 3000 [Urine:3000]    LAB:   Recent Results (from the past 24 hour(s))   TYPE & SCREEN    Collection Time: 17 11:09 AM   Result Value Ref Range    Crossmatch Expiration 2017     ABO/Rh(D) Vernon Valencia POSITIVE     Antibody screen NEG    GLUCOSE, POC    Collection Time: 17 11:12 AM   Result Value Ref Range    Glucose (POC) 149 (H) 65 - 100 mg/dL    Performed by Caterina Berry    GLUCOSE, POC    Collection Time: 17  4:45 PM   Result Value Ref Range    Glucose (POC) 133 (H) 65 - 100 mg/dL    Performed by Rosemary Velazquez, POC    Collection Time: 17 9:15 PM   Result Value Ref Range    Glucose (POC) 283 (H) 65 - 100 mg/dL    Performed by JASPER HOLLAND    METABOLIC PANEL, BASIC    Collection Time: 04/12/17  3:10 AM   Result Value Ref Range    Sodium 137 136 - 145 mmol/L    Potassium 4.1 3.5 - 5.1 mmol/L    Chloride 102 97 - 108 mmol/L    CO2 25 21 - 32 mmol/L    Anion gap 10 5 - 15 mmol/L    Glucose 235 (H) 65 - 100 mg/dL    BUN 10 6 - 20 MG/DL    Creatinine 0.91 0.55 - 1.02 MG/DL    BUN/Creatinine ratio 11 (L) 12 - 20      GFR est AA >60 >60 ml/min/1.73m2    GFR est non-AA >60 >60 ml/min/1.73m2    Calcium 8.0 (L) 8.5 - 10.1 MG/DL   HEMOGLOBIN    Collection Time: 04/12/17  3:10 AM   Result Value Ref Range    HGB 10.9 (L) 11.5 - 16.0 g/dL   GLUCOSE, POC    Collection Time: 04/12/17  6:23 AM   Result Value Ref Range    Glucose (POC) 226 (H) 65 - 100 mg/dL    Performed by Fredrick Pepper       Lab Results   Component Value Date/Time    INR 1.0 03/27/2017 09:01 AM    INR 1.0 07/24/2015 09:30 AM     Lab Results   Component Value Date/Time    HGB 10.9 04/12/2017 03:10 AM    HGB 13.6 03/27/2017 09:01 AM    HGB 14.4 08/23/2016 01:52 PM    HGB 14.1 04/11/2016 09:01 AM     Recent Labs      04/12/17   0310   NA  137   K  4.1   CL  102   BUN  10   CREA  0.91   GLU  235*   CA  8.0*       PT/OT:   Gait:                    PATIENT MOBILITY                         Assessment and Plan    Principal Problem:    Primary osteoarthritis of right knee (4/11/2017)    Active Chronic Problems:  Stable. Continue to monitor & encourage best management--to f/u with PCP. Obesity (BMI 30.0-34.9) (4/11/2017)    DM: BGs elevated--patient refused SSI regular last night but did take this morning. SSI changed to lispro. Expected rise with surgery & steroids. Holding metformin until sure of good GI status. Consult to diabetes center for med management & assessment of home. POD#1 Procedure(s):  RIGHT TOTAL KNEE ARTHROPLASTY   Expected acute blood loss anemia related to surgery.   No indications of active bleeding-continue to monitor. Labs trending as expected. VTE prophylaxis: ASA, Mobilization, Ankle pumping exercises, SCDs per order if not able to exercise or mobilize well. Weight bearing:  WBAT  Pain management:  Multi-modal pain plan, see above for medication,  Ice packs & elevation of extremity per orders, active gentle ROM & mobilize frequently for short periods of time. PT: to be evaluated  Wound benign. Neurovascularly intact. Progressing well. No indications of concerns. Continue present plans per orthopedic attending surgeon & interdisciplinary team for joint replacement.         Signed By: Kiley Copeland NP    RN, MSN, MA, Adult NP-BC

## 2017-04-13 VITALS
RESPIRATION RATE: 18 BRPM | BODY MASS INDEX: 34.4 KG/M2 | DIASTOLIC BLOOD PRESSURE: 75 MMHG | OXYGEN SATURATION: 95 % | HEART RATE: 85 BPM | WEIGHT: 186.95 LBS | HEIGHT: 62 IN | TEMPERATURE: 98 F | SYSTOLIC BLOOD PRESSURE: 143 MMHG

## 2017-04-13 LAB
GLUCOSE BLD STRIP.AUTO-MCNC: 155 MG/DL (ref 65–100)
GLUCOSE BLD STRIP.AUTO-MCNC: 209 MG/DL (ref 65–100)
SERVICE CMNT-IMP: ABNORMAL
SERVICE CMNT-IMP: ABNORMAL

## 2017-04-13 PROCEDURE — 82962 GLUCOSE BLOOD TEST: CPT

## 2017-04-13 PROCEDURE — 74011636637 HC RX REV CODE- 636/637: Performed by: NURSE PRACTITIONER

## 2017-04-13 PROCEDURE — 74011250637 HC RX REV CODE- 250/637: Performed by: NURSE PRACTITIONER

## 2017-04-13 PROCEDURE — 74011250637 HC RX REV CODE- 250/637: Performed by: ORTHOPAEDIC SURGERY

## 2017-04-13 PROCEDURE — 97116 GAIT TRAINING THERAPY: CPT | Performed by: PHYSICAL THERAPIST

## 2017-04-13 PROCEDURE — 97110 THERAPEUTIC EXERCISES: CPT | Performed by: PHYSICAL THERAPIST

## 2017-04-13 RX ORDER — ASPIRIN 325 MG
325 TABLET, DELAYED RELEASE (ENTERIC COATED) ORAL 2 TIMES DAILY
Qty: 60 TAB | Refills: 0 | Status: SHIPPED | OUTPATIENT
Start: 2017-04-13 | End: 2017-08-14 | Stop reason: ALTCHOICE

## 2017-04-13 RX ORDER — OXYCODONE HYDROCHLORIDE 5 MG/1
5-10 TABLET ORAL
Qty: 90 TAB | Refills: 0 | Status: SHIPPED | OUTPATIENT
Start: 2017-04-13 | End: 2018-12-06 | Stop reason: ALTCHOICE

## 2017-04-13 RX ADMIN — ACETAMINOPHEN 500 MG: 500 TABLET, FILM COATED ORAL at 09:14

## 2017-04-13 RX ADMIN — SULFASALAZINE 1000 MG: 500 TABLET ORAL at 09:14

## 2017-04-13 RX ADMIN — OXYCODONE HYDROCHLORIDE 10 MG: 5 TABLET ORAL at 05:55

## 2017-04-13 RX ADMIN — Medication 10 ML: at 05:55

## 2017-04-13 RX ADMIN — ASPIRIN 325 MG: 325 TABLET, DELAYED RELEASE ORAL at 09:14

## 2017-04-13 RX ADMIN — OXYCODONE HYDROCHLORIDE 10 MG: 5 TABLET ORAL at 02:11

## 2017-04-13 RX ADMIN — OXYCODONE HYDROCHLORIDE 10 MG: 5 TABLET ORAL at 12:07

## 2017-04-13 RX ADMIN — AMLODIPINE BESYLATE 5 MG: 5 TABLET ORAL at 09:14

## 2017-04-13 RX ADMIN — OXYCODONE HYDROCHLORIDE 10 MG: 5 TABLET ORAL at 09:15

## 2017-04-13 RX ADMIN — DOCUSATE SODIUM AND SENNOSIDES 1 TABLET: 8.6; 5 TABLET, FILM COATED ORAL at 09:14

## 2017-04-13 RX ADMIN — HYDROCHLOROTHIAZIDE: 25 TABLET ORAL at 09:14

## 2017-04-13 RX ADMIN — FAMOTIDINE 20 MG: 20 TABLET ORAL at 09:14

## 2017-04-13 RX ADMIN — ACETAMINOPHEN 500 MG: 500 TABLET, FILM COATED ORAL at 05:55

## 2017-04-13 RX ADMIN — INSULIN LISPRO 2 UNITS: 100 INJECTION, SOLUTION INTRAVENOUS; SUBCUTANEOUS at 06:47

## 2017-04-13 NOTE — DISCHARGE INSTRUCTIONS
Patient meets criteria for   BUNDLED PAYMENT   for Care Improvement Initiative Criteria    Contact Information for Orthopedic Nurse Navigator:      ATUL Mead, RN-BC  [phone]  S:[phone]    After Hospital Care Plan:  Discharge Instructions Knee Replacement- Dr. Hang Jaeger    Patient Name: Kerrie Singh  Date of procedure: 4/11/2017   Procedure: Procedure(s):  RIGHT TOTAL KNEE ARTHROPLASTY   Surgeon: Jumana Archre) and Role:     * Elisa Call MD - Primary  PCP: Esteban Lezama MD  Date of discharge: No discharge date for patient encounter. Follow up appointments   Follow up with Dr. Hang Jaeger in 4 weeks. Call [phone] to make an appointment.  If home health has been arranged for you the agency will contact you to arrange dates/times for visits. Please call them if you do not hear from them within 24 hours after you are discharged    When to call your Orthopaedic Surgeon: Call [phone]. If you call after 5pm or on a weekend, the on call physician will be contacted   Unrelieved pain   Signs of infection-if your incision is red; continues to have drainage; drainage has a foul odor or if you have a persistent fever over 101 degrees   Signs of a blood clot in your leg-calf pain, tenderness, redness, swelling of lower leg    When to call your Primary Care Physician:   Concerns about medical conditions such as diabetes, high blood pressure, asthma, congestive heart failure   Call if blood sugars are elevated, persistent headache or dizziness, coughing or congestion, constipation or diarrhea, burning with urination, abnormal heart rate    When to call [id number] go to the nearest emergency room   Acute onset of chest pain, shortness of breath, difficulty breathing      Activity   Weight bearing as tolerated with walker or crutches.  Refer to pages 23-31 of your handbook for instructions and pictures   Complete your Home Exercise Program daily as instructed by your therapist. Refer to pages 33-41 of your handbook for instructions and pictures   Get up every one hour and walk (except at night when sleeping)   Do not drive or operate heavy machinery      Incision Care   The Aquacel (brown, waterproof) surgical dressing is to remain on your knee for 7 days. On the 7th day have someone gently peel the dressing off by carefully lifting the edge and stretching it slightly to break the adhesive seal   If you have steri-strips (small, white pieces of tape) on your incision, they may come off when you remove the Aquacel surgical dressing. This is okay. You may now leave your incision open to air   If your Aquacel dressing comes loose/off before the 7th day, you may replace it with a dry sterile gauze dressing; change it daily. Once you incision is not draining, you may leave it open to air   You may take a shower with the Aquacel dressing in place. Once the Aquacel is removed, you may shower and get your incision wet but do not submerge your incision under water in a bath tub, hot tub or swimming pool for 6 weeks after surgery. Preventing blood clots    Take Enteric coated Aspirin (delayed release) one tablet twice a day for one month following surgery    Pain management   Take pain medication as prescribed; decrease the amount you use as your pain lessens   Avoid alcoholic beverages while taking pain medication   Continue to take Tylenol (acetaminophen) for pain control. Please be aware that many medications contain Tylenol. We do not want you to over medicate so please read the information below as a guide. Do not take more than 3 Grams of Tylenol in a 24 hour period.   (There are 1000 milligrams in one Gram)   o Tylenol 325 mg per tablet (do not take more than 9 tablets in 24 hours)  o Tylenol 500 mg per tablet (do not take more than 6 tablets in 24 hours)  o Tylenol 650 mg per tablet (do not take more than 4 tablets in 24 hours)   You may place an ice bag on your knee for 15-20 minutes after exercising and as needed for comfort    Diet   Resume usual diet; drink plenty of fluids; eat foods high in fiber   You may want to take a stool softener (such as Senokot-S or Colace) to prevent constipation while you are taking pain medication. If constipation occurs, take a laxative (such as Dulcolax tablets, Milk of Magnesia, or a suppository)      [facility] Protocol (to be followed by [address])  Nursing-per physicians order   Complete head to toe assessment, vital signs   Medication reconciliation   Review pain management   Manage chronic medical conditions    Physical Therapy-per physicians order  Weight bearing status:  Precautions at Admission: Fall, WBAT     Right Side Weight Bearing: As tolerated    Mobility Status:  Supine to Sit: Modified independent  Sit to Stand: Stand-by asssistance        Gait:  Distance (ft): 120 Feet (ft)  Ambulation - Level of Assistance: Contact guard assistance  Assistive Device: Gait belt, Walker, rolling  Gait Abnormalities: Antalgic, Decreased step clearance, Step to gait    ADL status overall composite:                Physical Therapy   Assessment and evaluation-bed mobility; functional transfers (bed, chair, bathroom, stairs); ambulation with equipment, car transfers, shower transfers, safety and ability to get out of house in the event of an emergency   Review weight bearing as tolerated, wean from walker or crutches as tolerated   Discuss pain management   Review how to do ADLs. Refer to page 42 of patient handbook    Home Exercise Program-refer to pages 33-41 of patient handbook for exercises.

## 2017-04-13 NOTE — PROGRESS NOTES
Bedside shift change report given to ANIL Ortiz (oncoming nurse) by Richard Resendez (offgoing nurse). Report included the following information SBAR and Kardex.

## 2017-04-13 NOTE — PROGRESS NOTES
Bedside and Verbal shift change report given to Jessy Huitron (oncoming nurse) by Lambert Farias (offgoing nurse). Report included the following information SBAR, Kardex, Intake/Output, MAR and Accordion.

## 2017-04-13 NOTE — PROGRESS NOTES
I have reviewed discharge instructions with the patient and spouse. The patient and spouse verbalized understanding. Copy of AVS and scripts sent with pt. Educated on new meds, next medication administration times, s&s of infection and blood clots. Pt and spouse verbalized understanding, no further questions at this time. IV removed per protocol, belongings returned.

## 2017-04-13 NOTE — PROGRESS NOTES
Complaints: none   Events: none    GEN:  NAD. AOx3   ABD:  S/NT/ND   RLE:  Dressing C/D/I    5/5 motor    Calf nttp (Bilat)    Sensate all distribution to light touch    1+ dp/pt pulses, foot perfused      Lab Results   Component Value Date/Time    HGB 10.9 04/12/2017 03:10 AM    INR 1.0 03/27/2017 09:01 AM       Lab Results   Component Value Date/Time    Sodium 137 04/12/2017 03:10 AM    Potassium 4.1 04/12/2017 03:10 AM    Chloride 102 04/12/2017 03:10 AM    CO2 25 04/12/2017 03:10 AM    BUN 10 04/12/2017 03:10 AM    Creatinine 0.91 04/12/2017 03:10 AM    Calcium 8.0 04/12/2017 03:10 AM            POD#2 R TKA. Doing Well  Acute post-operative blood-loss anemia - expected  ABX: Complete today  PATHWAY: D/C Andrez per protocol  DVT Prophylaxis: Aspirin, SCD, ALF   Weight Bearing: WBAT RLE   Pain Control: PRN oral narcotics  Anticipated Discharge Date: 4/13/17   Disposition: Home, HHPT.

## 2017-04-13 NOTE — PROGRESS NOTES
Problem: Mobility Impaired (Adult and Pediatric)  Goal: *Acute Goals and Plan of Care (Insert Text)  Physical Therapy Goals  Initiated 4/12/2017    1. Patient will move from supine to sit and sit to supine in bed with modified independence within 4 days. 2. Patient will perform sit to stand with modified independence within 4 days. 3. Patient will ambulate with modified independence for 250 feet with the least restrictive device within 4 days. 4. Patient will ascend/descend 4 stairs with use of handrail(s) with modified independence within 4 days. 5. Patient will perform home exercise program per protocol with independence within 4 days. 6. Patient will demonstrate AROM 0-90 degrees in operative joint within 4 days. PHYSICAL THERAPY TREATMENT  Patient: Magaly Gomez (89 y.o. female)  Date: 4/13/2017  Diagnosis: OA RIGHT KNEE  Primary osteoarthritis of right knee Primary osteoarthritis of right knee  Procedure(s) (LRB):  RIGHT TOTAL KNEE ARTHROPLASTY  (Right) 2 Days Post-Op  Precautions: Fall, WBAT      ASSESSMENT:  Patient making steady progress toward goals. Patient received sitting and chair is agreeable to PT. Sit to stand with CGA and cues for technique. Amb approx 125 feet with RW and CGA with decreased step clearance and overall antalgic gait pattern. Up/down 2 stairs with CGA using B rails. Patient does not have to navigate stairs at home but instructed in case she encounters stairs at in community. Instructed in all exercises with good return demo. Recommend  PT at discharge. Patient cleared for DC at this time and nurse notified. Progression toward goals:  [X]      Improving appropriately and progressing toward goals  [ ]      Improving slowly and progressing toward goals  [ ]      Not making progress toward goals and plan of care will be adjusted       PLAN:  Patient continues to benefit from skilled intervention to address the above impairments.   Continue treatment per established plan of care. Discharge Recommendations:  Home Health  Further Equipment Recommendations for Discharge:  TBD       SUBJECTIVE:   Patient stated I'm glad you said I'm meeting goals because I didn't feel like I was meeting any.       OBJECTIVE DATA SUMMARY:   Critical Behavior:  Neurologic State: Alert  Orientation Level: Oriented X4  Cognition: Follows commands  Safety/Judgement: Awareness of environment                          Functional Mobility Training:  Bed Mobility:      not tested              Transfers:  Sit to Stand: Stand-by asssistance  Stand to Sit: Stand-by asssistance                             Balance:  Sitting: Intact  Standing: Intact; With support  Ambulation/Gait Training:  Distance (ft): 120 Feet (ft)  Assistive Device: Gait belt;Walker, rolling  Ambulation - Level of Assistance: Contact guard assistance        Gait Abnormalities: Antalgic;Decreased step clearance; Step to gait  Right Side Weight Bearing: As tolerated     Base of Support: Widened  Stance: Right decreased  Speed/[place]: Pace decreased (<100 feet/min); Shuffled; Slow  Step Length: Left shortened;Right shortened                    Stairs:  Number of Stairs Trained: 2  Stairs - Level of Assistance: Contact guard assistance  Rail Use: Both  Therapeutic Exercises:     EXERCISE   Sets   Reps   Active Active Assist   Passive Self ROM   Comments   Ankle Pumps 1 10 [ ]                                        [ ]                                        [ ]                                        [ ]                                            Quad Sets 1 8 [ ]                                        [ ]                                        [ ]                                        [ ]                                            Hamstring Sets     [ ]                                        [ ]                                        [ ]                                        [ ]                                            Dorothyann Heimlich     [ ] [ ]                                        [ ]                                        [ ]                                            Henrietta Diamond       [ ]                                          [ ]                                          [ ]                                          [ ]                                            Heel Slides 1 8 [ ]                                        [ ]                                        [ ]                                        [ ]                                            Marquita Baxter     [ ]                                        [ ]                                        [ ]                                        [ ]                                            Worthy Fruit     [ ]                                        [ ]                                        [ ]                                        [ ]                                            Edis Sesay     [ ]                                        [ ]                                        [ ]                                        [ ]                                               Pain:  Pain Scale 1: Numeric (0 - 10)  Pain Intensity 1: 2  Pain Location 1: Back;Knee  Pain Orientation 1: Right  Pain Description 1: Aching  Pain Intervention(s) 1: Ice  Activity Tolerance:   VSS  Please refer to the flowsheet for vital signs taken during this treatment.   After treatment:   [X] Patient left in no apparent distress sitting up in chair  [ ] Patient left in no apparent distress in bed  [X] Call bell left within reach  [X] Nursing notified  [X] Caregiver present  [ ] Bed alarm activated      COMMUNICATION/COLLABORATION:   The patients plan of care was discussed with: Physical Therapist, Occupational Therapist and Registered Nurse     Arturo Oliveros PT, DPT   Time Calculation: 23 mins

## 2017-04-14 ENCOUNTER — TELEPHONE (OUTPATIENT)
Dept: OTHER | Age: 67
End: 2017-04-14

## 2017-04-14 ENCOUNTER — PATIENT OUTREACH (OUTPATIENT)
Dept: INTERNAL MEDICINE CLINIC | Age: 67
End: 2017-04-14

## 2017-04-14 ENCOUNTER — NURSE NAVIGATOR (OUTPATIENT)
Dept: OTHER | Age: 67
End: 2017-04-14

## 2017-04-14 NOTE — PROGRESS NOTES
This note will not be viewable in [address] Post Discharge Follow-up contact after Joint Replacement    Patient discharged on 4/13/17  By  Rosalba Nelson   following  right knee Arthroplasty. Spoke with patient today, who reports they are \" moving along well, doing my exercises, and I have talked with At Home. \"  Denies Fever, Shortness of Breath or Chest Pain. Home Health has not visited. Patient also reports:. Incision  clean, dry, intact  Calf is non-tender,   operative extremity has minimal swelling. Pain is well managed. Discussed use of ice & elevation. Taking Aspirin for anticoagulation. Patient is not experiencing symptoms of constipation & urinating without difficulty. Discussed side effects of anticoagulants & pain medications (bleeding/bruising, constipation, lightheaded/dizziness)  Follow up appointment is not scheduled; but patient states plan to schedule. Discussed calling surgeon Dr Dorothy Saucedo  for drainage, bleeding, swelling in operative extremity, fever or pain. Discussed calling PCP Dr Armando Holt with other medical issues.

## 2017-04-14 NOTE — PROGRESS NOTES
NNTOCIP call. Patient discharged on 4/13/17 from [facility] for a scheduled procedure. Admission dates: 4/11/17 - 4/13/17. SILVINA call completed by Terrell Hermosillo RN, marino note. SURGEON: Genesis Berger MD   POSTOPERATIVE DIAGNOSIS: Osteoarthritis of right knee.   PROCEDURES PERFORMED: Right total knee arthroplasty.

## 2017-04-14 NOTE — NURSE NAVIGATOR
[address]  SBAR Bundled Payment Handoff     FROM:                                TO: At [facility]                                                      ([address] [place] [address] or Facility name)  [address]  [address] [address]  Dept: [address] [address]: [phone]                                      Room#:  570/01                                                    Nurse Navigator:  Shadi Edmondson RN           SITUATION      ASAScore: ASA 2 - Patient with mild systemic disease with no functional limitations    Admitted:  4/11/2017  Hospital Day: 3      Attending Provider:  No att. providers found     Consultations:  None    PCP:  Sapna Munson MD   [phone]     Admitting Dx:  OA RIGHT KNEE  Primary osteoarthritis of right knee       Principal Problem:    Primary osteoarthritis of right knee (4/11/2017)    Active Problems:    Obesity (BMI 30.0-34.9) (4/11/2017)      3 Days Post-Op of   Procedure(s):  RIGHT TOTAL KNEE ARTHROPLASTY    BY: Gutierrez Norris MD             ON: 4/11/2017                  Code Status: Prior             Advance Directive? No Doesnt Have (Send w/patient)     Isolation:  There are currently no Active Isolations       MDRO: No current active infections    BACKGROUND     Allergies:   Allergies   Allergen Reactions    Latex Swelling     SWELLING TONGUE, ORAL CAVITY    Benadryl [Diphenhydramine Hcl] Unknown (comments)     Raises heart rate    Atropine Swelling     Eye swelling    Chocolate [Cocoa] Rash    Compazine [Prochlorperazine Edisylate] Other (comments)     Muscle spasm, POSSIBLE SEIZURES    Cymbalta [Duloxetine] Vertigo    Diclofenac Sodium (Bulk) Rash    Milk Rash    Phenobarbital Other (comments)     tachy       Past Medical History:   Diagnosis Date    Ankylosing spondylitis (HCC) 2/8/2012    Asthma     Chronic pain     Diabetes (HCC)     Essential hypertension, benign     First degree atrioventricular block  GERD (gastroesophageal reflux disease)     Hypercholesteremia     Iritis 1/15/2009    PUD (peptic ulcer disease)     HX ULCERS 1970'S    S/P partial hysterectomy     Sacroiliitis ([facility] 9/15/2010    Seizures ([facility] CHILD    HX SEIZURES CHILD (NOT CURRENT PROBLEM)    Spinal stenosis, lumbar 14    mri tamiko at l4-l5    TMJ (dislocation of temporomandibular joint) 2009    Unspecified adverse effect of anesthesia     woke up durning  cataract and hysterectomy        Past Surgical History:   Procedure Laterality Date    ENDOSCOPY, COLON, DIAGNOSTIC  07    negative     HX CATARACT REMOVAL      bilateral  / lens implant    HX GYN  13    bilat oophorectomy    HX HYSTERECTOMY      HX KNEE ARTHROSCOPY      right    HX ORTHOPAEDIC  8/6/15    LUMBAR LAMINECTOMY L3-4 dr Amada Talley       Prior to Admission Medications   Prescriptions Last Dose Informant Patient Reported? Taking? albuterol (PROVENTIL HFA, VENTOLIN HFA, PROAIR HFA) 90 mcg/actuation inhaler   No No   Sig: Take 1 Puff by inhalation every six (6) hours as needed for Wheezing for up to 30 days. albuterol (PROVENTIL HFA, VENTOLIN HFA, PROAIR HFA) 90 mcg/actuation inhaler 4/10/2017 at Unknown time  Yes Yes   Sig: Take 1 Puff by inhalation every six (6) hours as needed. amLODIPine (NORVASC) 5 mg tablet 2017 at Unknown time  No Yes   Sig: TAKE 1 TABLET BY MOUTH DAILY   atorvastatin (LIPITOR) 20 mg tablet 4/10/2017 at Unknown time  No Yes   Sig: TAKE 1 TABLET BY MOUTH EVERY EVENING   Patient taking differently: TAKE 1/2 TABLET BY MOUTH EVERY EVENING   fluticasone (FLONASE) 50 mcg/actuation nasal spray 4/10/2017 at Unknown time  No Yes   Si sprays by Both Nostrils route daily. melatonin 5 mg cap capsule 2017 at Unknown time  Yes Yes   Sig: Take 5 mg by mouth nightly.    metFORMIN ER (GLUCOPHAGE XR) 500 mg tablet 4/10/2017 at Unknown time  No Yes Sig: TAKE 1 TABLET BY MOUTH EVERY DAY WITH DINNER   montelukast (SINGULAIR) 10 mg tablet 4/11/2017 at Unknown time  No Yes   Sig: TAKE 1 TABLET BY MOUTH EVERY DAY   olmesartan-hydrochlorothiazide (BENICAR HCT) 40-25 mg per tablet 4/11/2017 at Unknown time  No Yes   Sig: Take 1 Tab by mouth daily. prednisoLONE acetate (PRED FORTE) 1 % ophthalmic suspension Not Taking at Unknown time  Yes No   Sig: Administer 1 Drop to both eyes as needed. raNITIdine (ZANTAC) 150 mg tablet 4/11/2017 at Unknown time  No Yes   Sig: Take 1 Tab by mouth two (2) times a day. sulfaSALAzine (AZULFIDINE) 500 mg tablet 4/11/2017 at Unknown time  Yes Yes   Sig: Take 1,000 mg by mouth two (2) times a day. Facility-Administered Medications: None       Vaccinations:    Immunization History   Administered Date(s) Administered    Influenza High Dose Vaccine PF 10/06/2016    Pneumococcal Conjugate (PCV-13) 05/25/2016    Pneumococcal Polysaccharide (PPSV-23) 08/06/2014    Pneumococcal Vaccine (Unspecified Type) 02/10/2011    Varicella Virus Vaccine Live 02/01/2011    Zoster Vaccine, Live 01/01/2011         ASSESSMENT   Age: 77 y.o. Gender: female        Height: Height: 5' 2\" (157.5 cm)                    Weight:Weight: 84.8 kg (186 lb 15.2 oz)     No data found. Active Orders   There are no active orders of the following type(s): Diet.        Orientation: Orientation Level: Oriented X4    Active Lines/Drains:  (Peg Tube / Maguire / CL or S/L?):no    Urinary Status: Voiding      Last BM: Last Bowel Movement Date: 04/10/17     Skin Integrity: Incision (comment) (RLE)   Wound Knee Right-DRESSING STATUS: Clean, dry, and intact    Wound Knee Right-DRESSING TYPE: Silver products (Aquacel)    Mobility: Slightly limited   Weight Bearing Status: WBAT (Weight Bearing as Tolerated)      Gait Training  Assistive Device: Gait belt, Walker, rolling  Ambulation - Level of Assistance: Contact guard assistance  Distance (ft): 120 Feet (ft)  Stairs - Level of Assistance: Contact guard assistance  Number of Stairs Trained: 2  Rail Use: Both     On Anticoagulation? YES  Aspirin                Lab Results   Component Value Date/Time    Glucose 235 04/12/2017 03:10 AM    Hemoglobin A1c 6.7 03/27/2017 09:01 AM    INR 1.0 03/27/2017 09:01 AM    INR 1.0 07/24/2015 09:30 AM    HGB 10.9 04/12/2017 03:10 AM    HGB 13.6 03/27/2017 09:01 AM    HGB 14.4 08/23/2016 01:52 PM    HGB 14.1 04/11/2016 09:01 AM       Readmission Risks:  Score:         RECOMMENDATION     See After Visit Summary (AVS) for:  · Discharge instructions  · After [address]   · Medication Reconciliation          [address] [place] [address] Orthopaedic Nurse Navigator  ATUL Good, RN-BC       Office  [phone]  [facility]      [phone]  Fax      [phone]  [email or web address]             . Lloyd

## 2017-05-08 ENCOUNTER — TELEPHONE (OUTPATIENT)
Dept: INTERNAL MEDICINE CLINIC | Age: 67
End: 2017-05-08

## 2017-05-08 NOTE — TELEPHONE ENCOUNTER
Patient states ever since she had her knee replaced on 4/11/17, her bp has been approx 156/90. She is unsure whether her meds need to be adjusted or not.  Please call [phone]

## 2017-05-12 ENCOUNTER — OFFICE VISIT (OUTPATIENT)
Dept: INTERNAL MEDICINE CLINIC | Age: 67
End: 2017-05-12

## 2017-05-12 VITALS
DIASTOLIC BLOOD PRESSURE: 88 MMHG | OXYGEN SATURATION: 98 % | HEIGHT: 62 IN | SYSTOLIC BLOOD PRESSURE: 143 MMHG | TEMPERATURE: 98.7 F | WEIGHT: 180 LBS | BODY MASS INDEX: 33.13 KG/M2 | HEART RATE: 85 BPM | RESPIRATION RATE: 18 BRPM

## 2017-05-12 DIAGNOSIS — I10 ESSENTIAL HYPERTENSION WITH GOAL BLOOD PRESSURE LESS THAN 130/80: ICD-10-CM

## 2017-05-12 RX ORDER — DOCUSATE SODIUM 100 MG/1
100 CAPSULE, LIQUID FILLED ORAL 2 TIMES DAILY
COMMUNITY
End: 2018-02-14 | Stop reason: ALTCHOICE

## 2017-05-12 RX ORDER — ACETAMINOPHEN 325 MG/1
325 TABLET ORAL AS NEEDED
COMMUNITY

## 2017-05-12 RX ORDER — AMLODIPINE BESYLATE 5 MG/1
7.5 TABLET ORAL DAILY
Qty: 135 TAB | Refills: 0 | Status: SHIPPED | OUTPATIENT
Start: 2017-05-12 | End: 2017-09-06 | Stop reason: SDUPTHER

## 2017-05-12 NOTE — PROGRESS NOTES
HISTORY OF PRESENT ILLNESS  Tom Watts is a 77 y.o. female. HPI  Worked in today because of hypertension. On April 11th she had knee surgery. She notes that since that time her  has been doing more cooking and their diet has had a higher salt content. She is doing physical therapy today. The physical therapist took her pressure and it was 150/103. Earlier in the week it was 150/90. She denies headaches or chest pain, significant constipation. She's been able to titrate her Oxycodone down to one pill every 4-5 hours and pain is controlled with this. She's using Colace. She denies dizzy spells or chest pain. She's on Benicar, HCTZ and Amlodipine. Review of Systems   Constitutional: Negative for chills, fever and weight loss. Respiratory: Negative for cough, shortness of breath and wheezing. Cardiovascular: Negative for chest pain, palpitations, orthopnea, leg swelling and PND. Gastrointestinal: Negative for abdominal pain, constipation, heartburn and nausea. Musculoskeletal: Negative for myalgias. Neurological: Negative for dizziness and headaches. Physical Exam   Constitutional: She is oriented to person, place, and time. She appears well-developed and well-nourished. HENT:   Head: Normocephalic and atraumatic. Neck: Normal range of motion. Neck supple. Carotid bruit is not present. No thyromegaly present. Cardiovascular: Normal rate, regular rhythm, S1 normal, S2 normal, normal heart sounds and intact distal pulses. No murmur heard. Pulmonary/Chest: Effort normal and breath sounds normal. No respiratory distress. She has no wheezes. She has no rales. Musculoskeletal: She exhibits no edema. Neurological: She is alert and oriented to person, place, and time. Psychiatric: She has a normal mood and affect. Her behavior is normal.   Nursing note and vitals reviewed. Michelle Collins was seen today for hypertension.     Diagnoses and all orders for this visit:    Essential hypertension with goal blood pressure less than 130/80  -     amLODIPine (NORVASC) 5 mg tablet; Take 1.5 Tabs by mouth daily.       the following changes in treatment are made: inc from 5 to 7.5 mg and try to dec dietary salt   appt in 3mo sooner if bp stays up  Sees rheumatology end of  Mo for remicade and will have bp checked

## 2017-05-12 NOTE — MR AVS SNAPSHOT
Visit Information Date & Time Provider Department Dept. Phone Encounter #  
 5/12/2017 11:00 AM Wanna Dakin, MD [facility] [address] [facility] [id number] Your Appointments 5/15/2017  9:00 AM  
ROUTINE CARE with Wanna Dakin, MD  
[facility]) Appt Note: discuss bp  
 [address] [facility] [id number] [phone]  
  
   
 [address] [facility] [address] Upcoming Health Maintenance Date Due DTaP/Tdap/Td series (1 - Tdap) 12/20/1971 OSTEOPOROSIS SCREENING (DEXA) 12/20/2015 MEDICARE YEARLY EXAM 12/20/2015 FOOT EXAM Q1 5/25/2017 BREAST CANCER SCRN MAMMOGRAM 6/4/2017 INFLUENZA AGE 9 TO ADULT 8/1/2017 MICROALBUMIN Q1 8/23/2017 HEMOGLOBIN A1C Q6M 9/27/2017 LIPID PANEL Q1 11/9/2017 EYE EXAM RETINAL OR DILATED Q1 2/7/2018 GLAUCOMA SCREENING Q2Y 2/7/2019 Pneumococcal 65+ Low/Medium Risk (2 of 2 - PPSV23) 8/6/2019 COLONOSCOPY 11/6/2019 Allergies as of 5/12/2017  Review Complete On: 5/12/2017 By: Wanna Dakin, MD  
  
 Severity Noted Reaction Type Reactions Latex  01/15/2009    Swelling SWELLING TONGUE, ORAL CAVITY Benadryl [Diphenhydramine Hcl] Medium 03/07/2011   Systemic Unknown (comments) Raises heart rate Atropine  01/15/2009    Swelling Eye swelling Chocolate [Cocoa]  03/27/2017    Rash Compazine [Prochlorperazine Edisylate]  01/15/2009    Other (comments) Muscle spasm, POSSIBLE SEIZURES Cymbalta [Duloxetine]  03/14/2017    Vertigo Diclofenac Sodium (Bulk)  11/13/2012    Rash Milk  03/27/2017    Rash Phenobarbital  01/15/2009    Other (comments)  
 tachy Current Immunizations  Reviewed on 3/14/2017 Name Date Influenza High Dose Vaccine PF 10/6/2016 Pneumococcal Conjugate (PCV-13) 5/25/2016 Pneumococcal Polysaccharide (PPSV-23) 8/6/2014 Pneumococcal Vaccine (Unspecified Type) 2/10/2011 10:59 AM  
 Varicella Virus Vaccine Live 2/1/2011 Zoster Vaccine, Live 1/1/2011 Not reviewed this visit You Were Diagnosed With   
  
 Codes Comments Essential hypertension with goal blood pressure less than 130/80     ICD-10-CM: I10 
ICD-9-CM: 401.9 Vitals BP Pulse Temp Resp Height(growth percentile) Weight(growth percentile) 143/88 (BP 1 Location: Left arm, BP Patient Position: Sitting) 85 98.7 °F (37.1 °C) 18 5' 2\" (1.575 m) 180 lb (81.6 kg) SpO2 BMI OB Status Smoking Status 98% 32.92 kg/m2 Hysterectomy Never Smoker Vitals History BMI and BSA Data Body Mass Index Body Surface Area  
 32.92 kg/m 2 1.89 m 2 Preferred Pharmacy Pharmacy Name Phone [facility] [address] [facility], [address] [place] [address] AT [address] [place] [address] [phone] Your Updated Medication List  
  
   
This list is accurate as of: 5/12/17 12:13 PM.  Always use your most recent med list.  
  
  
  
  
 * albuterol 90 mcg/actuation inhaler Commonly known as:  PROVENTIL HFA, VENTOLIN HFA, PROAIR HFA Take 1 Puff by inhalation every six (6) hours as needed. * albuterol 90 mcg/actuation inhaler Commonly known as:  PROVENTIL HFA, VENTOLIN HFA, PROAIR HFA Take 1 Puff by inhalation every six (6) hours as needed for Wheezing for up to 30 days. amLODIPine 5 mg tablet Commonly known as:  Julee Medici Take 1.5 Tabs by mouth daily. aspirin delayed-release 325 mg tablet Take 1 Tab by mouth two (2) times a day. atorvastatin 20 mg tablet Commonly known as:  LIPITOR  
TAKE 1 TABLET BY MOUTH EVERY EVENING  
  
 COLACE 100 mg capsule Generic drug:  docusate sodium Take 100 mg by mouth two (2) times a day. fluticasone 50 mcg/actuation nasal spray Commonly known as:  FLONASE  
2 sprays by Both Nostrils route daily. melatonin 5 mg Cap capsule Take 5 mg by mouth nightly. metFORMIN  mg tablet Commonly known as:  GLUCOPHAGE XR  
TAKE 1 TABLET BY MOUTH EVERY DAY WITH DINNER  
  
 montelukast 10 mg tablet Commonly known as:  SINGULAIR  
TAKE 1 TABLET BY MOUTH EVERY DAY  
  
 olmesartan-hydroCHLOROthiazide 40-25 mg per tablet Commonly known as:  BENICAR HCT Take 1 Tab by mouth daily. oxyCODONE IR 5 mg immediate release tablet Commonly known as:  Nirmala Hails Take 1-2 Tabs by mouth every four (4) hours as needed for Pain. Max Daily Amount: 60 mg.  
  
 prednisoLONE acetate 1 % ophthalmic suspension Commonly known as:  PRED FORTE Administer 1 Drop to both eyes as needed. raNITIdine 150 mg tablet Commonly known as:  ZANTAC Take 1 Tab by mouth two (2) times a day. sulfaSALAzine 500 mg tablet Commonly known as:  AZULFIDINE Take 1,000 mg by mouth two (2) times a day. TYLENOL 325 mg tablet Generic drug:  acetaminophen Take  by mouth every four (4) hours as needed for Pain. * Notice: This list has 2 medication(s) that are the same as other medications prescribed for you. Read the directions carefully, and ask your doctor or other care provider to review them with you. Prescriptions Sent to Pharmacy Refills  
 amLODIPine (NORVASC) 5 mg tablet 0 Sig: Take 1.5 Tabs by mouth daily. Class: Normal  
 Pharmacy: [facility] [address] [place] [address], [address] [place] [address] List Ph #: [phone] Route: Oral  
  
Introducing [facility]! Dear Yan Her: 
Thank you for requesting a [employer] account. Our records indicate that you already have an active [employer] account. You can access your account anytime at [email or web address] Did you know that you can access your hospital and ER discharge instructions at any time in [employer]? You can also review all of your test results from your hospital stay or ER visit. Additional Information If you have questions, please visit the Frequently Asked Questions section of the [employer] website at [email or web address] Remember, [employer] is NOT to be used for urgent needs. For medical emergencies, dial 911. Now available from your iPhone and Android! Please provide this summary of care documentation to your next provider. Your primary care clinician is listed as Chava Davis. If you have any questions after today's visit, please call [phone].

## 2017-05-25 DIAGNOSIS — I10 ESSENTIAL HYPERTENSION WITH GOAL BLOOD PRESSURE LESS THAN 130/80: ICD-10-CM

## 2017-05-25 RX ORDER — OLMESARTAN MEDOXOMIL-HYDROCHLOROTHIAZIDE 40; 25 MG/1; MG/1
TABLET, FILM COATED ORAL
Qty: 90 TAB | Refills: 3 | Status: SHIPPED | OUTPATIENT
Start: 2017-05-25 | End: 2017-12-13 | Stop reason: SDUPTHER

## 2017-06-29 DIAGNOSIS — I10 ESSENTIAL HYPERTENSION WITH GOAL BLOOD PRESSURE LESS THAN 130/80: ICD-10-CM

## 2017-06-29 RX ORDER — AMLODIPINE BESYLATE 5 MG/1
TABLET ORAL
Qty: 90 TAB | Refills: 2 | Status: SHIPPED | OUTPATIENT
Start: 2017-06-29 | End: 2017-08-14 | Stop reason: SDUPTHER

## 2017-07-12 RX ORDER — MONTELUKAST SODIUM 10 MG/1
TABLET ORAL
Qty: 30 TAB | Refills: 11 | Status: SHIPPED | OUTPATIENT
Start: 2017-07-12 | End: 2018-06-06 | Stop reason: SDUPTHER

## 2017-08-14 ENCOUNTER — HOSPITAL ENCOUNTER (OUTPATIENT)
Dept: LAB | Age: 67
Discharge: HOME | End: 2017-08-14
Payer: MEDICARE

## 2017-08-14 ENCOUNTER — OFFICE VISIT (OUTPATIENT)
Dept: INTERNAL MEDICINE CLINIC | Age: 67
End: 2017-08-14

## 2017-08-14 VITALS
DIASTOLIC BLOOD PRESSURE: 73 MMHG | HEART RATE: 87 BPM | SYSTOLIC BLOOD PRESSURE: 125 MMHG | HEIGHT: 62 IN | WEIGHT: 187.2 LBS | OXYGEN SATURATION: 98 % | RESPIRATION RATE: 16 BRPM | TEMPERATURE: 98.8 F | BODY MASS INDEX: 34.45 KG/M2

## 2017-08-14 DIAGNOSIS — Z23 ENCOUNTER FOR IMMUNIZATION: ICD-10-CM

## 2017-08-14 DIAGNOSIS — E11.65 TYPE 2 DIABETES MELLITUS WITH HYPERGLYCEMIA, WITHOUT LONG-TERM CURRENT USE OF INSULIN (HCC): ICD-10-CM

## 2017-08-14 DIAGNOSIS — Z71.89 ADVANCED CARE PLANNING/COUNSELING DISCUSSION: ICD-10-CM

## 2017-08-14 DIAGNOSIS — Z13.39 SCREENING FOR ALCOHOLISM: ICD-10-CM

## 2017-08-14 DIAGNOSIS — Z78.0 POSTMENOPAUSAL: ICD-10-CM

## 2017-08-14 DIAGNOSIS — Z00.00 MEDICARE ANNUAL WELLNESS VISIT, INITIAL: Primary | ICD-10-CM

## 2017-08-14 DIAGNOSIS — I10 ESSENTIAL HYPERTENSION, BENIGN: ICD-10-CM

## 2017-08-14 DIAGNOSIS — Z00.00 MEDICARE ANNUAL WELLNESS VISIT, SUBSEQUENT: ICD-10-CM

## 2017-08-14 DIAGNOSIS — Z00.00 ROUTINE GENERAL MEDICAL EXAMINATION AT A HEALTH CARE FACILITY: ICD-10-CM

## 2017-08-14 DIAGNOSIS — Z12.31 ENCOUNTER FOR SCREENING MAMMOGRAM FOR BREAST CANCER: ICD-10-CM

## 2017-08-14 DIAGNOSIS — E78.00 HYPERCHOLESTEROLEMIA: ICD-10-CM

## 2017-08-14 DIAGNOSIS — Z13.31 SCREENING FOR DEPRESSION: ICD-10-CM

## 2017-08-14 PROCEDURE — 36415 COLL VENOUS BLD VENIPUNCTURE: CPT

## 2017-08-14 PROCEDURE — 84443 ASSAY THYROID STIM HORMONE: CPT

## 2017-08-14 PROCEDURE — 82043 UR ALBUMIN QUANTITATIVE: CPT

## 2017-08-14 PROCEDURE — 83036 HEMOGLOBIN GLYCOSYLATED A1C: CPT

## 2017-08-14 PROCEDURE — 80053 COMPREHEN METABOLIC PANEL: CPT

## 2017-08-14 PROCEDURE — 80061 LIPID PANEL: CPT

## 2017-08-14 RX ORDER — FOLIC ACID 1 MG/1
2 TABLET ORAL DAILY
Refills: 11 | COMMUNITY
Start: 2017-07-27 | End: 2019-06-25 | Stop reason: ALTCHOICE

## 2017-08-14 RX ORDER — METHOTREXATE 2.5 MG/1
6 TABLET ORAL
Refills: 11 | COMMUNITY
Start: 2017-07-27 | End: 2019-06-25 | Stop reason: ALTCHOICE

## 2017-08-14 NOTE — PROGRESS NOTES
HISTORY OF PRESENT ILLNESS  Cruz Flynn is a 77 y.o. female. HPI  Shukri Charles is seen for med check, as well as wellness visit with nurse navigator. Issues:  1. Diabetes. Her A1c has been low. She is on Metformin 500 a day. She's not had hypoglycemia symptoms. 2. Hypertension. Did bump her Amlodipine to 7.5, which she has tolerated. No significant edema. 3. She's on Lipitor 20 mg, half tab daily. She's back on Methotrexate because she was having more joint flares. She has just resumed the Methotrexate in the last week. 4. She is due for bone density and mammogram.      Review of Systems   Constitutional: Positive for malaise/fatigue. Negative for chills, fever and weight loss. Respiratory: Negative for cough, shortness of breath and wheezing. Cardiovascular: Negative for chest pain, palpitations, orthopnea, leg swelling and PND. Gastrointestinal: Negative for abdominal pain, constipation, diarrhea, heartburn and nausea. Genitourinary: Negative for dysuria, frequency and urgency. Musculoskeletal: Positive for joint pain. Negative for falls and myalgias. Skin: Negative for rash. Neurological: Negative for dizziness and headaches. Psychiatric/Behavioral: Negative for depression. Physical Exam   Constitutional: She is oriented to person, place, and time. She appears well-developed and well-nourished. HENT:   Head: Normocephalic and atraumatic. Mouth/Throat: Oropharynx is clear and moist.   Neck: Normal range of motion. Neck supple. Carotid bruit is not present. No thyromegaly present. Cardiovascular: Normal rate, regular rhythm, S1 normal, S2 normal, normal heart sounds and intact distal pulses. No murmur heard. Pulmonary/Chest: Effort normal and breath sounds normal. No respiratory distress. She has no wheezes. She has no rales. Musculoskeletal: She exhibits no edema. Feet in good repair dp 1 plus   Neurological: She is alert and oriented to person, place, and time. Psychiatric: She has a normal mood and affect. Her behavior is normal.   Nursing note and vitals reviewed. ASSESSMENT and PLAN  Diagnoses and all orders for this visit:    1. Medicare annual wellness visit, subsequent  I personally saw and examined the patient. I have reviewed and agree with the Nurse navigators findings including all diagnostic interpretations and plans as written. I was present during the key parts of separately billed procedures. 2. Type 2 diabetes mellitus with hyperglycemia, without long-term current use of insulin (HCC)  -     HEMOGLOBIN A1C WITH EAG  -     MICROALBUMIN, UR, RAND W/ MICROALBUMIN/CREA RATIO    3. Essential hypertension, benign    4. Hypercholesterolemia  -     METABOLIC PANEL, COMPREHENSIVE  -     LIPID PANEL  -     TSH 3RD GENERATION    5. Postmenopausal  -     DEXA BONE DENSITY STUDY AXIAL; Future    6. Encounter for immunization    7. Encounter for screening mammogram for breast cancer  -     [facility] 3D DENISE W MAMMO BI SCREENING INCL CAD;  Future

## 2017-08-14 NOTE — PROGRESS NOTES
Chief Complaint   Patient presents with    Blood Pressure Check       1. Have you been to the ER, urgent care clinic since your last visit? Hospitalized since your last visit? No    2. Have you seen or consulted any other health care providers outside of the [address] [place] [address] since your last visit? Include any pap smears or colon screening. Yes, Dr. Joey Cornejo - [place], Dr. Giorgi Anderson - for [place] [address] Maintenance Due   Topic Date Due    DTaP/Tdap/Td series (1 - Tdap) 04/06/2007    OSTEOPOROSIS SCREENING (DEXA)  12/20/2015    MEDICARE YEARLY EXAM  12/20/2015    FOOT EXAM Q1  05/25/2017    BREAST CANCER SCRN MAMMOGRAM  06/04/2017    INFLUENZA AGE 9 TO ADULT  08/01/2017    MICROALBUMIN Q1  08/23/2017     Advanced Care Directive is not on file.  Information added to AVS.

## 2017-08-15 PROBLEM — Z71.89 ADVANCED CARE PLANNING/COUNSELING DISCUSSION: Status: ACTIVE | Noted: 2017-08-15

## 2017-08-15 LAB
ALBUMIN SERPL-MCNC: 4.5 G/DL (ref 3.6–4.8)
ALBUMIN/CREAT UR: 9.9 MG/G CREAT (ref 0–30)
ALBUMIN/GLOB SERPL: 2 {RATIO} (ref 1.2–2.2)
ALP SERPL-CCNC: 63 IU/L (ref 39–117)
ALT SERPL-CCNC: 34 IU/L (ref 0–32)
AST SERPL-CCNC: 25 IU/L (ref 0–40)
BILIRUB SERPL-MCNC: 0.8 MG/DL (ref 0–1.2)
BUN SERPL-MCNC: 20 MG/DL (ref 8–27)
BUN/CREAT SERPL: 26 (ref 12–28)
CALCIUM SERPL-MCNC: 9.7 MG/DL (ref 8.7–10.3)
CHLORIDE SERPL-SCNC: 98 MMOL/L (ref 96–106)
CHOLEST SERPL-MCNC: 167 MG/DL (ref 100–199)
CO2 SERPL-SCNC: 20 MMOL/L (ref 18–29)
CREAT SERPL-MCNC: 0.76 MG/DL (ref 0.57–1)
CREAT UR-MCNC: 343.7 MG/DL
EST. AVERAGE GLUCOSE BLD GHB EST-MCNC: 146 MG/DL
GLOBULIN SER CALC-MCNC: 2.3 G/DL (ref 1.5–4.5)
GLUCOSE SERPL-MCNC: 179 MG/DL (ref 65–99)
HBA1C MFR BLD: 6.7 % (ref 4.8–5.6)
HDLC SERPL-MCNC: 44 MG/DL
INTERPRETATION, 910389: NORMAL
LDLC SERPL CALC-MCNC: 77 MG/DL (ref 0–99)
Lab: NORMAL
MICROALBUMIN UR-MCNC: 34.1 UG/ML
POTASSIUM SERPL-SCNC: 4 MMOL/L (ref 3.5–5.2)
PROT SERPL-MCNC: 6.8 G/DL (ref 6–8.5)
SODIUM SERPL-SCNC: 141 MMOL/L (ref 134–144)
TRIGL SERPL-MCNC: 231 MG/DL (ref 0–149)
TSH SERPL DL<=0.005 MIU/L-ACNC: 2.03 UIU/ML (ref 0.45–4.5)
VLDLC SERPL CALC-MCNC: 46 MG/DL (ref 5–40)

## 2017-08-15 NOTE — PATIENT INSTRUCTIONS
Medicare Part B Preventive Services Guidelines/Limitations Date last completed and Frequency Due Date   Bone Mass Measurement  (age 72 & older, biennial) Requires diagnosis related to osteoporosis or estrogen deficiency. Biennial benefit unless patient has history of long-term glucocorticoid tx or baseline is needed because initial test was by other method As recommended by your PCP or Specialist    Recommended every 2 years As recommended by your PCP or Specialist     Cardiovascular Screening Blood Tests (every 5 years)  Total cholesterol, HDL, Triglycerides Order as a panel if possible Completed 11/2016    As recommended by your PCP As recommended by your PCP or Specialist   Colorectal Cancer Screening  -Fecal occult blood test (annual)  -Flexible sigmoidoscopy (5y)  -Screening colonoscopy (10y)  -Barium Enema Age 49-80; After age [de-identified] if history of abnormal results Completed 11/6/2014     Recommended follow-up in 5 years  As recommended by your PCP or Specialist     Counseling to Prevent Tobacco Use (up to 8 sessions per year)  - Counseling greater than 3 and up to 10 minutes  - Counseling greater than 10 minutes Patients must be asymptomatic of tobacco-related conditions to receive as preventive service N/A N/A   Diabetes Screening Tests (at least every 3 years, Medicare covers annually or at 6-month intervals for prediabetic patients)    Fasting blood sugar (FBS) or glucose tolerance test (GTT) Patient must be diagnosed with one of the following:  -Hypertension, Dyslipidemia, obesity, previous impaired FBS or GTT  Or any two of the following: overweight, FH of diabetes, age ? 72, history of gestational diabetes, birth of baby weighing more than 9 pounds Completed 3/2017    Recommended every 3-6 months for Pre-Diabetics and Diabetics As recommended by your PCP or Specialist     Glaucoma Screening (no USPSTF recommendation) Diabetes mellitus, family history, , age 48 or over,  American, age 65 or over Completed 2/2017    Recommended annually As recommended by your PCP or Specialist   Seasonal Influenza Vaccination (annually)  Completed 10/2016    Recommended Annually Due Fall 2017   TDAP Vaccination  Td completed 4/5/2007    Recommended every 10 years Patient will request Tdap vaccine at next appt. Zoster (Shingles) Vaccination Covered by Medicare Part D through the pharmacy- PCP provides prescription Completed 1/2011    Recommended once over age 48  Complete   Pneumococcal Vaccination (once after 72)  Pneumo 23- 8/2014  Recommended once over the age of 72    Prevnar 15- 5/2016 Recommended once over the age of 72 Complete        Complete   Screening Mammography (biennial age 54-69) Annually (age 36 or over) As recommended by your PCP or Specialist   As recommended by your PCP or Specialist     Screening Pap Tests and Pelvic Examination (up to age 79 and after 79 if unknown history or abnormal study last 8 years) Every 25 months except high risk As recommended by your PCP or Specialist   As recommended by your PCP or Specialist     Ultrasound Screening for Abdominal Aortic Aneurysm (AAA) (once) Patient must be referred through IPPE and not have had a screening for abdominal aortic aneurysm before under Medicare. Limited to patients who meet one of the following criteria:  - Men who are 73-68 years old and have smoked more than 100 cigarettes in their lifetime.  -Anyone with a FH of AAA  -Anyone recommended for screening by USPSTF Not indicated unless recommended by PCP   Not indicated unless recommended by PCP     Family Practice Management 2011    Please bring a copy of your completed advance medical directive to the office so it may be added to your medical record. Thank you. If you have any questions or concerns please feel free to contact me at [phone]. It was a pleasure meeting you today and participating in your healthcare.   Ryan Washington RN

## 2017-08-15 NOTE — PROGRESS NOTES
Nurse Navigator Medicare Wellness Visit performed by RICA Kelley    This is an Initial Prema Exam (AWV) (Performed 12 months after IPPE or effective date of Medicare Part B enrollment, Once in a lifetime)    I have reviewed the patient's medical history in detail and updated the computerized patient record. History     Past Medical History:   Diagnosis Date    Ankylosing spondylitis ([address] 2/8/2012    Asthma     Chronic pain     Diabetes ([address]     Essential hypertension, benign     First degree atrioventricular block     GERD (gastroesophageal reflux disease)     Hypercholesteremia     Iritis 1/15/2009    PUD (peptic ulcer disease)     HX ULCERS 1970'S    S/P partial hysterectomy     Sacroiliitis ([address] 9/15/2010    Seizures ([address] CHILD    HX SEIZURES CHILD (NOT CURRENT PROBLEM)    Spinal stenosis, lumbar 5/29/14    mri tamiko at l4-l5    TMJ (dislocation of temporomandibular joint) 9/4/2009    Unspecified adverse effect of anesthesia     woke up durning  cataract and hysterectomy       Past Surgical History:   Procedure Laterality Date    ENDOSCOPY, COLON, DIAGNOSTIC  1/01/07    negative     HX CATARACT REMOVAL  1992    bilateral  / lens implant    HX GYN  9/28/13    bilat oophorectomy    HX HYSTERECTOMY  2008    HX KNEE ARTHROSCOPY  2003    right    HX ORTHOPAEDIC  8/6/15    LUMBAR LAMINECTOMY L3-4 dr Jairo Sandoval HX TONSILLECTOMY  1958     Current Outpatient Prescriptions   Medication Sig Dispense Refill    methotrexate (RHEUMATREX) 2.5 mg tablet Take 6 Tabs by mouth Every Friday. 11    folic acid (FOLVITE) 1 mg tablet Take 2 Tabs by mouth daily. 11    montelukast (SINGULAIR) 10 mg tablet TAKE 1 TABLET BY MOUTH EVERY DAY 30 Tab 11    BENICAR HCT 40-25 mg per tablet TAKE 1 TABLET BY MOUTH DAILY 90 Tab 3    acetaminophen (TYLENOL) 325 mg tablet Take  by mouth every four (4) hours as needed for Pain.       amLODIPine (NORVASC) 5 mg tablet Take 1.5 Tabs by mouth daily. 135 Tab 0    oxyCODONE IR (ROXICODONE) 5 mg immediate release tablet Take 1-2 Tabs by mouth every four (4) hours as needed for Pain. Max Daily Amount: 60 mg. 90 Tab 0    metFORMIN ER (GLUCOPHAGE XR) 500 mg tablet TAKE 1 TABLET BY MOUTH EVERY DAY WITH DINNER 30 Tab 6    prednisoLONE acetate (PRED FORTE) 1 % ophthalmic suspension Administer 1 Drop to both eyes as needed.  melatonin 5 mg cap capsule Take 5 mg by mouth nightly.  atorvastatin (LIPITOR) 20 mg tablet TAKE 1 TABLET BY MOUTH EVERY EVENING (Patient taking differently: TAKE 1/2 TABLET BY MOUTH EVERY EVENING) 30 Tab 5    fluticasone (FLONASE) 50 mcg/actuation nasal spray 2 sprays by Both Nostrils route daily. 1 Bottle 3    docusate sodium (COLACE) 100 mg capsule Take 100 mg by mouth two (2) times a day.  raNITIdine (ZANTAC) 150 mg tablet Take 1 Tab by mouth two (2) times a day. 30 Tab 0    albuterol (PROVENTIL HFA, VENTOLIN HFA, PROAIR HFA) 90 mcg/actuation inhaler Take 1 Puff by inhalation every six (6) hours as needed.  albuterol (PROVENTIL HFA, VENTOLIN HFA, PROAIR HFA) 90 mcg/actuation inhaler Take 1 Puff by inhalation every six (6) hours as needed for Wheezing for up to 30 days.  1 Inhaler 3     Allergies   Allergen Reactions    Latex Swelling     SWELLING TONGUE, ORAL CAVITY    Benadryl [Diphenhydramine Hcl] Unknown (comments)     Raises heart rate    Atropine Swelling     Eye swelling    Chocolate [Cocoa] Rash    Compazine [Prochlorperazine Edisylate] Other (comments)     Muscle spasm, POSSIBLE SEIZURES    Cymbalta [Duloxetine] Vertigo    Diclofenac Sodium (Bulk) Rash    Milk Rash    Phenobarbital Other (comments)     tachy     Family History   Problem Relation Age of Onset    Diabetes Mother     Heart Disease Mother     Hypertension Father     Cancer Father      PAROTID, LUNG METS    Heart Disease Brother     Anesth Problems Neg Hx      Social History   Substance Use Topics    Smoking status: Never Smoker    Smokeless tobacco: Never Used    Alcohol use Yes      Comment: occassional     Patient Active Problem List   Diagnosis Code    S/P partial hysterectomy Z90.711    HTN (hypertension) I10    Hypercholesterolemia E78.00    Iritis H20.9    TMJ (dislocation of temporomandibular joint) S03. 00XA    Sacroiliitis ([address] M46.1    Essential hypertension, benign I10    First degree atrioventricular block I44.0    Ankylosing spondylitis (HCC) M45.9    Spinal stenosis, lumbar M48.06    Primary osteoarthritis of right knee M17.11    Obesity (BMI 30.0-34. 9) E66.9    Type 2 diabetes mellitus with hyperglycemia, without long-term current use of insulin (HCC) E11.65    Advanced care planning/counseling discussion Z71.89         Depression Risk Factor Screening:   Patient denies feelings of being down, depressed or hopeless at this time. Patient states that they have a strong support system within their family & friends. PHQ over the last two weeks 8/15/2017   Little interest or pleasure in doing things Not at all   Feeling down, depressed or hopeless Not at all   Total Score PHQ 2 0     Alcohol Risk Factor Screening: On any occasion during the past 3 months, have you had more than 3 drinks containing alcohol? No    Do you average more than 7 drinks per week? No      Functional Ability and Level of Safety:     Hearing Loss   normal-to-mild    Activities of Daily Living   Self-care. Patient states that she lives with her spouse in a private residence. Patient states independence in all ADLs & she ambulates with a cane for stability & balance. NN encouraged patient to continue and/ or introduce routine physical exercise into their daily routine as applicable & as recommended by PCP. Patient verbalized understanding & agreement to take this into consideration.    Requires assistance with:   ADL Assessment 8/15/2017   Feeding yourself No Help Needed   Getting from bed to chair No Help Needed   Getting dressed No Help Needed   Bathing or showering No Help Needed   Walk across the room (includes cane/walker) No Help Needed   Using the telphone No Help Needed   Taking your medications No Help Needed   Preparing meals No Help Needed   Managing money (expenses/bills) No Help Needed   Moderately strenuous housework (laundry) No Help Needed   Shopping for personal items (toiletries/medicines) No Help Needed   Shopping for groceries No Help Needed   Driving No Help Needed   Climbing a flight of stairs No Help Needed   Getting to places beyond walking distances No Help Needed       Fall Risk   Fall Risk Assessment, last 12 mths 8/15/2017   Able to walk? Yes   Fall in past 12 months? No   Fall with injury? -   Number of falls in past 12 months -   Fall Risk Score -   Patient denies falls within the past year & verbalizes awareness of fall prevention strategies. Abuse Screen   Patient is not abused     Abuse Screening Questionnaire 8/15/2017   Do you ever feel afraid of your partner? N   Are you in a relationship with someone who physically or mentally threatens you? N   Is it safe for you to go home? Y       Review of Systems   Medicare Wellness Visit    Physical Examination     No exam data present    Evaluation of Cognitive Function:  Mood/affect:  happy  Appearance: age appropriate and casually dressed  Family member/caregiver input: None present; however, patient reports a strong support system. No exam performed today, Medicare Wellness Visit.     Patient Care Team:  Fariha Hardwick MD as PCP - Taryn Perdomo, RN as Ambulatory Care Navigator (Internal Medicine)    Advice/Referrals/Counseling   Education and counseling provided:  End-of-Life planning (with patient's consent)  Pneumococcal Vaccine  Influenza Vaccine  Screening Mammography  Colorectal cancer screening tests  Bone mass measurement (DEXA)  Screening for glaucoma  tdap & shingles vaccinations      Assessment/Plan   1. NN discussed with patient about an Advanced Medical Directive. Provided patient blank Advanced Medical Directive and 'Your Right to Decide' Booklet. NN reviewed Advanced Medical Directive paperwork with patient with a brief description of how to complete the form. Requested that if document completed, to please provide a copy of completed Advanced Medical Directive to PCP office. Patient verbalized understanding. 2. Patient is up to date on the following immunizations:  Immunization History   Administered Date(s) Administered    Influenza High Dose Vaccine PF 10/06/2016    Pneumococcal Conjugate (PCV-13) 05/25/2016    Pneumococcal Polysaccharide (PPSV-23) 08/06/2014    Td 04/05/2007    Varicella Virus Vaccine Live 02/01/2011    ZZZ-RETIRED (DO NOT USE) Pneumococcal Vaccine (Unspecified Type) 02/10/2011    Zoster Vaccine, Live 01/01/2011   Patient confirmed the aforementioned preventative immunization dates are correct. Patient states that she will request a tdap vaccine at her next PCP appointment. Patient's health maintenance immunization record has been updated & is current. 3. Patient states that she follows the PCP's recommendations for the following screenings: Mammography, pap/ pelvic, DEXA scan & colonoscopy (report on file from 11/6/2014 performed by Dr. Nasima Miller at [facility] with recommended follow-up screening in 5 years, 2019). Patient states that she is due for a screening mammogram & screening dexa scan. Today, PCP provided patient with orders for a screening mammogram & a screening dexa scan. 4. Patient wears corrective lenses. Patient reports having a routine eye exam & glaucoma screening within the last year (2/2017) performed by Dr. Lala Tay at [facility]. A copy of this eye exam report is on file in the patient's medical record. Patient verbalized understanding of all information discussed.  Patient was given the opportunity to ask questions. Medication reconciliation completed by MA/ LPN and reviewed by PCP. Patient provided AVS, either a printed version or electronic version in [employer], which includes Medicare Wellness Preventative Screening Table.

## 2017-09-06 DIAGNOSIS — I10 ESSENTIAL HYPERTENSION WITH GOAL BLOOD PRESSURE LESS THAN 130/80: ICD-10-CM

## 2017-09-06 RX ORDER — AMLODIPINE BESYLATE 5 MG/1
TABLET ORAL
Qty: 135 TAB | Refills: 0 | Status: SHIPPED | OUTPATIENT
Start: 2017-09-06 | End: 2017-12-03 | Stop reason: SDUPTHER

## 2017-09-19 ENCOUNTER — OFFICE VISIT (OUTPATIENT)
Dept: INTERNAL MEDICINE CLINIC | Age: 67
End: 2017-09-19

## 2017-09-19 VITALS
TEMPERATURE: 98.4 F | WEIGHT: 188.8 LBS | RESPIRATION RATE: 14 BRPM | DIASTOLIC BLOOD PRESSURE: 78 MMHG | HEIGHT: 62 IN | HEART RATE: 91 BPM | BODY MASS INDEX: 34.74 KG/M2 | OXYGEN SATURATION: 98 % | SYSTOLIC BLOOD PRESSURE: 126 MMHG

## 2017-09-19 DIAGNOSIS — J45.901 RAD (REACTIVE AIRWAY DISEASE), UNSPECIFIED ASTHMA SEVERITY, WITH ACUTE EXACERBATION: ICD-10-CM

## 2017-09-19 DIAGNOSIS — J20.9 ACUTE BRONCHITIS, UNSPECIFIED ORGANISM: ICD-10-CM

## 2017-09-19 DIAGNOSIS — I10 ESSENTIAL HYPERTENSION, BENIGN: Primary | ICD-10-CM

## 2017-09-19 DIAGNOSIS — M45.9 ANKYLOSING SPONDYLITIS, UNSPECIFIED SITE OF SPINE (HCC): ICD-10-CM

## 2017-09-19 RX ORDER — CODEINE PHOSPHATE AND GUAIFENESIN 10; 100 MG/5ML; MG/5ML
5 SOLUTION ORAL
Qty: 120 ML | Refills: 0 | Status: SHIPPED | OUTPATIENT
Start: 2017-09-19 | End: 2018-02-14 | Stop reason: ALTCHOICE

## 2017-09-19 RX ORDER — PREDNISONE 20 MG/1
TABLET ORAL
Qty: 13 TAB | Refills: 0 | Status: SHIPPED | OUTPATIENT
Start: 2017-09-19 | End: 2018-02-14 | Stop reason: ALTCHOICE

## 2017-09-19 RX ORDER — DOXYCYCLINE 100 MG/1
100 CAPSULE ORAL 2 TIMES DAILY
Qty: 20 CAP | Refills: 0 | Status: SHIPPED | OUTPATIENT
Start: 2017-09-19 | End: 2018-02-14 | Stop reason: ALTCHOICE

## 2017-09-19 NOTE — PROGRESS NOTES
HISTORY OF PRESENT ILLNESS  Arsalan Mariano is a 77 y.o. female. HPI   Ankylosing spondylitis: Patient reports she is still on Methotrexate. She states she will follow up with rheumatology. Acute bronchitis: Patient reports last month she was sick with congestion, sore throat, and cough. She states she had a wheeze and did not follow up with any one. She states this past weekend she started to run a fever. She admits to taking cough medication and tylenol. Patient reports she was on Amoxicillin two weeks ago and it did not help. RAD: Patient reports she has been using an inhaler to open her airways. She states she needs something stronger  Hypertension ROS: taking medications as instructed, no medication side effects noted, no TIA's, no chest pain on exertion, no dyspnea on exertion, no swelling of ankles. New concerns:  Patient's BP in office today is 126/78. Review of Systems   Respiratory: Positive for cough. All other systems reviewed and are negative. Physical Exam   Constitutional: She is oriented to person, place, and time. She appears well-developed and well-nourished. HENT:   Head: Normocephalic and atraumatic. Right Ear: External ear normal.   Left Ear: External ear normal.   Nose: Nose normal.   Mouth/Throat: Oropharynx is clear and moist.   Right ear fullness. Eyes: Conjunctivae and EOM are normal.   Neck: Normal range of motion. Neck supple. Carotid bruit is not present. No thyroid mass and no thyromegaly present. Cardiovascular: Normal rate, regular rhythm, S1 normal, S2 normal, normal heart sounds and intact distal pulses. Pulmonary/Chest: Effort normal and breath sounds normal.   Mild inspiratory and expiratory wheezes   Abdominal: Soft. Normal appearance and bowel sounds are normal. There is no hepatosplenomegaly. There is no tenderness. Musculoskeletal: Normal range of motion. Neurological: She is alert and oriented to person, place, and time. She has normal strength. No cranial nerve deficit or sensory deficit. Coordination normal.   Skin: Skin is warm, dry and intact. No abrasion and no rash noted. Psychiatric: She has a normal mood and affect. Her behavior is normal. Judgment and thought content normal.   Nursing note and vitals reviewed. ASSESSMENT and PLAN  Diagnoses and all orders for this visit:    1. Essential hypertension, benign  BP is at goal. I do not recommend any change in medications. 2. RAD (reactive airway disease), unspecified asthma severity, with acute exacerbation  Patient will need Prednisone to treat wheezes. -     predniSONE (DELTASONE) 20 mg tablet; Take 2 tabs daily x 4 days, then 1 tab daily x 3 days then 1/2 tab daily x 2 days  -     guaiFENesin-codeine (ROBITUSSIN AC) 100-10 mg/5 mL solution; Take 5 mL by mouth three (3) times daily as needed for Cough. Max Daily Amount: 15 mL. 3. Ankylosing spondylitis, unspecified site of spine ([address]  Continue on methotrexate. It is suppressing immune system and patient will call with rheumatology and discuss if she should hold while acutely sick. 4. Acute bronchitis, unspecified organism  Amox did not treat ( she took for dental procedure) and patient will start on Doxycycline.   -     doxycycline (MONODOX) 100 mg capsule; Take 1 Cap by mouth two (2) times a day. lab results and schedule of future lab studies reviewed with patient  reviewed diet, exercise and weight control    Written by Emily Caban, as dictated by Gerhardt Phenes, MD.     Current diagnosis and concerns discussed with pt at length. Understands risks and benefits or current treatment plan and medications and accepts the treatment and medication with any possible risks. Pt asks appropriate questions which were answered. Pt instructed to call with any concerns or problems.

## 2017-10-28 RX ORDER — METFORMIN HYDROCHLORIDE 500 MG/1
TABLET, EXTENDED RELEASE ORAL
Qty: 30 TAB | Refills: 0 | Status: SHIPPED | OUTPATIENT
Start: 2017-10-28 | End: 2017-11-29 | Stop reason: SDUPTHER

## 2017-11-29 RX ORDER — METFORMIN HYDROCHLORIDE 500 MG/1
TABLET, EXTENDED RELEASE ORAL
Qty: 30 TAB | Refills: 0 | Status: SHIPPED | OUTPATIENT
Start: 2017-11-29 | End: 2018-01-03 | Stop reason: SDUPTHER

## 2017-12-03 DIAGNOSIS — I10 ESSENTIAL HYPERTENSION WITH GOAL BLOOD PRESSURE LESS THAN 130/80: ICD-10-CM

## 2017-12-03 RX ORDER — AMLODIPINE BESYLATE 5 MG/1
TABLET ORAL
Qty: 135 TAB | Refills: 0 | Status: SHIPPED | OUTPATIENT
Start: 2017-12-03 | End: 2018-02-14 | Stop reason: SDUPTHER

## 2017-12-12 ENCOUNTER — TELEPHONE (OUTPATIENT)
Dept: INTERNAL MEDICINE CLINIC | Age: 67
End: 2017-12-12

## 2017-12-12 NOTE — TELEPHONE ENCOUNTER
Patient reports she has been dealing with a sinus infection for about 3 weeks now. He nasal drainage is now very dark & partly green but she is also seeing some blood when she blows her nose. She is not coughing & denies having fever & chills. She asks if something can be sent to help as she has tried otc nasal sprays with no relief. Please advise.

## 2017-12-12 NOTE — TELEPHONE ENCOUNTER
Patient notified needs appt tomorrow for evaluation. Patient agrees & is scheduled tomorrow at 10:00 for an acute visit.

## 2017-12-13 ENCOUNTER — OFFICE VISIT (OUTPATIENT)
Dept: INTERNAL MEDICINE CLINIC | Age: 67
End: 2017-12-13

## 2017-12-13 VITALS
SYSTOLIC BLOOD PRESSURE: 121 MMHG | DIASTOLIC BLOOD PRESSURE: 76 MMHG | BODY MASS INDEX: 34.04 KG/M2 | HEIGHT: 62 IN | WEIGHT: 185 LBS | TEMPERATURE: 98.8 F | HEART RATE: 83 BPM | OXYGEN SATURATION: 98 % | RESPIRATION RATE: 16 BRPM

## 2017-12-13 DIAGNOSIS — M46.1 SACROILIITIS (HCC): ICD-10-CM

## 2017-12-13 DIAGNOSIS — J01.01 ACUTE RECURRENT MAXILLARY SINUSITIS: Primary | ICD-10-CM

## 2017-12-13 DIAGNOSIS — I10 ESSENTIAL HYPERTENSION WITH GOAL BLOOD PRESSURE LESS THAN 130/80: ICD-10-CM

## 2017-12-13 RX ORDER — OLMESARTAN MEDOXOMIL AND HYDROCHLOROTHIAZIDE 40/25 40; 25 MG/1; MG/1
TABLET ORAL
Qty: 30 TAB | Refills: 6 | Status: SHIPPED | OUTPATIENT
Start: 2017-12-13 | End: 2018-06-06 | Stop reason: SDUPTHER

## 2017-12-13 RX ORDER — AMOXICILLIN AND CLAVULANATE POTASSIUM 875; 125 MG/1; MG/1
1 TABLET, FILM COATED ORAL EVERY 12 HOURS
Qty: 20 TAB | Refills: 0 | Status: SHIPPED | OUTPATIENT
Start: 2017-12-13 | End: 2018-02-14 | Stop reason: ALTCHOICE

## 2017-12-13 NOTE — PROGRESS NOTES
HISTORY OF PRESENT ILLNESS  Chuck Ceja is a 77 y.o. female. HPI  SUBJECTIVE:   Chuck Ceja is a 77 y.o. female who complains of congestion and nasal discharge green with scabs for 15 days. She denies a history of chest pain, chills, fevers, shortness of breath and wheezing and denies a history of asthma. Patient does not smoke cigarettes. Does have immunocompromise from her methotrexate  Review of Systems   Constitutional: Positive for malaise/fatigue. Negative for chills, diaphoresis and fever. HENT: Positive for congestion and nosebleeds. Negative for ear discharge, ear pain and sore throat. Eyes: Negative for pain and discharge. Respiratory: Negative for cough, hemoptysis, sputum production, shortness of breath and wheezing. Cardiovascular: Negative for chest pain. Neurological: Negative for headaches. Physical Exam   Constitutional: She appears well-developed and well-nourished. HENT:   Head: Normocephalic. Right Ear: Tympanic membrane, external ear and ear canal normal.   Left Ear: Tympanic membrane, external ear and ear canal normal.   Nose: Nose normal.   Mouth/Throat: Oropharynx is clear and moist and mucous membranes are normal. No oropharyngeal exudate. Tender maxillary area bilat   Eyes: Conjunctivae are normal. Pupils are equal, round, and reactive to light. Right eye exhibits no discharge. Left eye exhibits no discharge. Neck: Normal range of motion. Neck supple. Cardiovascular: Normal rate, regular rhythm and normal heart sounds. Pulmonary/Chest: Effort normal and breath sounds normal. No respiratory distress. She has no wheezes. She has no rales. Lymphadenopathy:     She has no cervical adenopathy. Skin: Skin is warm. Nursing note and vitals reviewed. ASSESSMENT and PLAN  Diagnoses and all orders for this visit:    1. Acute recurrent maxillary sinusitis-use coricidin hbp and nasal saline  Follow up if signs and symptoms worsen or change.  After hours number given. -     amoxicillin-clavulanate (AUGMENTIN) 875-125 mg per tablet; Take 1 Tab by mouth every twelve (12) hours.     2. Essential hypertension with goal blood pressure less than 130/80  -     olmesartan-hydroCHLOROthiazide (BENICAR HCT) 40-25 mg per tablet; TAKE 1 TABLET BY MOUTH DAILY    3. Sacroiliitis (HCC)

## 2017-12-13 NOTE — MR AVS SNAPSHOT
Visit Information Date & Time Provider Department Dept. Phone Encounter #  
 12/13/2017 10:00 AM Sonya Crocker MD [facility] [address] [facility] [id number] Your Appointments 2/14/2018  9:15 AM  
ROUTINE CARE with Sonya Crocker MD  
[facility]) Appt Note: 6 mo  
 [address] [facility] [address] [address]  
[phone]  
  
   
 [address] [facility] [address] Upcoming Health Maintenance Date Due DTaP/Tdap/Td series (1 - Tdap) 4/6/2007 OSTEOPOROSIS SCREENING (DEXA) 12/20/2015 EYE EXAM RETINAL OR DILATED Q1 2/7/2018 HEMOGLOBIN A1C Q6M 2/14/2018 FOOT EXAM Q1 8/14/2018 MICROALBUMIN Q1 8/14/2018 LIPID PANEL Q1 8/14/2018 MEDICARE YEARLY EXAM 8/15/2018 Pneumococcal 65+ Low/Medium Risk (2 of 2 - PPSV23) 8/6/2019 GLAUCOMA SCREENING Q2Y 8/8/2019 COLONOSCOPY 11/6/2019 Allergies as of 12/13/2017  Review Complete On: 12/13/2017 By: Sonya Crocker MD  
  
 Severity Noted Reaction Type Reactions Latex  01/15/2009    Swelling SWELLING TONGUE, ORAL CAVITY Benadryl [Diphenhydramine Hcl] Medium 03/07/2011   Systemic Unknown (comments) Raises heart rate Atropine  01/15/2009    Swelling Eye swelling Chocolate [Cocoa]  03/27/2017    Rash Compazine [Prochlorperazine Edisylate]  01/15/2009    Other (comments) Muscle spasm, POSSIBLE SEIZURES Cymbalta [Duloxetine]  03/14/2017    Vertigo Diclofenac Sodium (Bulk)  11/13/2012    Rash Milk  03/27/2017    Rash Phenobarbital  01/15/2009    Other (comments)  
 tachy Current Immunizations  Reviewed on 8/14/2017 Name Date Influenza High Dose Vaccine PF 11/2/2017 12:00 AM, 10/6/2016 Pneumococcal Conjugate (PCV-13) 5/25/2016 Pneumococcal Polysaccharide (PPSV-23) 8/6/2014 Td 4/5/2007 Varicella Virus Vaccine Live 2/1/2011 ZZZ-RETIRED (DO NOT USE) Pneumococcal Vaccine (Unspecified Type) 2/10/2011 10:59 AM  
 Zoster Vaccine, Live 1/1/2011 Not reviewed this visit You Were Diagnosed With   
  
 Codes Comments Acute recurrent maxillary sinusitis    -  Primary ICD-10-CM: J01.01 
ICD-9-CM: 461.0 Essential hypertension with goal blood pressure less than 130/80     ICD-10-CM: I10 
ICD-9-CM: 401.9 Sacroiliitis ([address]     ICD-10-CM: M46.1 ICD-9-CM: 720.2 Vitals BP Pulse Temp Resp Height(growth percentile) Weight(growth percentile) 121/76 (BP 1 Location: Left arm, BP Patient Position: Sitting) 83 98.8 °F (37.1 °C) (Oral) 16 5' 2\" (1.575 m) 185 lb (83.9 kg) SpO2 BMI OB Status Smoking Status 98% 33.84 kg/m2 Hysterectomy Never Smoker Vitals History BMI and BSA Data Body Mass Index Body Surface Area  
 33.84 kg/m 2 1.92 m 2 Preferred Pharmacy Pharmacy Name Phone [facility] [address] [facility], [address] [place] [address] AT [address] [place] [address] [phone] Your Updated Medication List  
  
   
This list is accurate as of: 12/13/17 10:58 AM.  Always use your most recent med list.  
  
  
  
  
 albuterol 90 mcg/actuation inhaler Commonly known as:  PROVENTIL HFA, VENTOLIN HFA, PROAIR HFA Take 1 Puff by inhalation every six (6) hours as needed. amLODIPine 5 mg tablet Commonly known as:  Wandra Belinda TAKE 1 AND 1/2 TABLETS BY MOUTH DAILY  
  
 amoxicillin-clavulanate 875-125 mg per tablet Commonly known as:  AUGMENTIN Take 1 Tab by mouth every twelve (12) hours. atorvastatin 20 mg tablet Commonly known as:  LIPITOR  
TAKE 1 TABLET BY MOUTH EVERY EVENING  
  
 COLACE 100 mg capsule Generic drug:  docusate sodium Take 100 mg by mouth two (2) times a day. doxycycline 100 mg capsule Commonly known as:  Daved Pollen Take 1 Cap by mouth two (2) times a day. fluticasone 50 mcg/actuation nasal spray Commonly known as:  FLONASE  
2 sprays by Both Nostrils route daily. folic acid 1 mg tablet Commonly known as:  Google Take 2 Tabs by mouth daily. guaiFENesin-codeine 100-10 mg/5 mL solution Commonly known as:  ROBITUSSIN AC Take 5 mL by mouth three (3) times daily as needed for Cough. Max Daily Amount: 15 mL.  
  
 melatonin 5 mg Cap capsule Take 5 mg by mouth nightly. metFORMIN  mg tablet Commonly known as:  GLUCOPHAGE XR  
TAKE 1 TABLET BY MOUTH EVERY DAY WITH DINNER  
  
 methotrexate 2.5 mg tablet Commonly known as:  Gerarda Jacques Take 6 Tabs by mouth Every Friday. montelukast 10 mg tablet Commonly known as:  SINGULAIR  
TAKE 1 TABLET BY MOUTH EVERY DAY  
  
 olmesartan-hydroCHLOROthiazide 40-25 mg per tablet Commonly known as:  BENICAR HCT  
TAKE 1 TABLET BY MOUTH DAILY  
  
 oxyCODONE IR 5 mg immediate release tablet Commonly known as:  Rubi Remington Take 1-2 Tabs by mouth every four (4) hours as needed for Pain. Max Daily Amount: 60 mg.  
  
 prednisoLONE acetate 1 % ophthalmic suspension Commonly known as:  PRED FORTE Administer 1 Drop to both eyes as needed. predniSONE 20 mg tablet Commonly known as:  Therman Rail Take 2 tabs daily x 4 days, then 1 tab daily x 3 days then 1/2 tab daily x 2 days  
  
 raNITIdine 150 mg tablet Commonly known as:  ZANTAC Take 1 Tab by mouth two (2) times a day. TYLENOL 325 mg tablet Generic drug:  acetaminophen Take  by mouth every four (4) hours as needed for Pain. Prescriptions Sent to Pharmacy Refills  
 olmesartan-hydroCHLOROthiazide (BENICAR HCT) 40-25 mg per tablet 6 Sig: TAKE 1 TABLET BY MOUTH DAILY Class: Normal  
 Pharmacy: [facility] [address] AT [address] [facility] #: [phone]  
 amoxicillin-clavulanate (AUGMENTIN) 875-125 mg per tablet 0 Sig: Take 1 Tab by mouth every twelve (12) hours. Class: Normal  
 Pharmacy: [facility] [address] [facility], [address] [facility] [address] [place] [address] Pamella Banerjee  #: [phone] Route: Oral  
  
Introducing [facility]! Dear Genesis Pitts: 
Thank you for requesting a [employer] account. Our records indicate that you already have an active [employer]ia account. You can access your account anytime at [email or web address]. [email or web address] Did you know that you can access your hospital and ER discharge instructions at any time in [employer]? You can also review all of your test results from your hospital stay or ER visit. Additional Information If you have questions, please visit the Frequently Asked Questions section of the [employer] website at [email or web address] Remember, [employer] is NOT to be used for urgent needs. For medical emergencies, dial 911. Now available from your iPhone and Android! Please provide this summary of care documentation to your next provider. Your primary care clinician is listed as Chava Davis. If you have any questions after today's visit, please call [phone].

## 2018-01-03 RX ORDER — ATORVASTATIN CALCIUM 20 MG/1
TABLET, FILM COATED ORAL
Qty: 30 TAB | Refills: 0 | Status: SHIPPED | OUTPATIENT
Start: 2018-01-03 | End: 2018-04-04 | Stop reason: SDUPTHER

## 2018-01-03 RX ORDER — METFORMIN HYDROCHLORIDE 500 MG/1
TABLET, EXTENDED RELEASE ORAL
Qty: 30 TAB | Refills: 0 | Status: SHIPPED | OUTPATIENT
Start: 2018-01-03 | End: 2018-01-31 | Stop reason: SDUPTHER

## 2018-01-29 ENCOUNTER — HOSPITAL ENCOUNTER (OUTPATIENT)
Dept: CT IMAGING | Age: 68
Discharge: HOME | End: 2018-01-29
Attending: OTOLARYNGOLOGY
Payer: MEDICARE

## 2018-01-29 DIAGNOSIS — J34.3 NASAL TURBINATE HYPERTROPHY: ICD-10-CM

## 2018-01-29 DIAGNOSIS — M45.9: ICD-10-CM

## 2018-01-29 DIAGNOSIS — E66.9 OBESITY: ICD-10-CM

## 2018-01-29 DIAGNOSIS — J32.9 SINUSITIS, CHRONIC: ICD-10-CM

## 2018-01-29 DIAGNOSIS — J31.0 RHINITIS, CHRONIC: ICD-10-CM

## 2018-01-29 DIAGNOSIS — J34.2 NASAL SEPTAL DEVIATION: ICD-10-CM

## 2018-01-29 PROCEDURE — 70486 CT MAXILLOFACIAL W/O DYE: CPT

## 2018-01-31 RX ORDER — METFORMIN HYDROCHLORIDE 500 MG/1
TABLET, EXTENDED RELEASE ORAL
Qty: 30 TAB | Refills: 0 | Status: SHIPPED | OUTPATIENT
Start: 2018-01-31 | End: 2018-02-15 | Stop reason: ALTCHOICE

## 2018-02-14 ENCOUNTER — HOSPITAL ENCOUNTER (OUTPATIENT)
Dept: LAB | Age: 68
Discharge: HOME | End: 2018-02-14
Payer: MEDICARE

## 2018-02-14 ENCOUNTER — OFFICE VISIT (OUTPATIENT)
Dept: INTERNAL MEDICINE CLINIC | Age: 68
End: 2018-02-14

## 2018-02-14 VITALS
SYSTOLIC BLOOD PRESSURE: 105 MMHG | OXYGEN SATURATION: 98 % | BODY MASS INDEX: 32.02 KG/M2 | HEIGHT: 62 IN | HEART RATE: 105 BPM | TEMPERATURE: 99.2 F | DIASTOLIC BLOOD PRESSURE: 73 MMHG | RESPIRATION RATE: 18 BRPM | WEIGHT: 174 LBS

## 2018-02-14 DIAGNOSIS — E11.65 TYPE 2 DIABETES MELLITUS WITH HYPERGLYCEMIA, WITHOUT LONG-TERM CURRENT USE OF INSULIN (HCC): ICD-10-CM

## 2018-02-14 DIAGNOSIS — R50.81 FEVER IN OTHER DISEASES: ICD-10-CM

## 2018-02-14 DIAGNOSIS — I10 ESSENTIAL HYPERTENSION WITH GOAL BLOOD PRESSURE LESS THAN 130/80: Primary | ICD-10-CM

## 2018-02-14 DIAGNOSIS — E78.00 HYPERCHOLESTEROLEMIA: ICD-10-CM

## 2018-02-14 PROCEDURE — 80053 COMPREHEN METABOLIC PANEL: CPT

## 2018-02-14 PROCEDURE — 82043 UR ALBUMIN QUANTITATIVE: CPT

## 2018-02-14 PROCEDURE — 80061 LIPID PANEL: CPT

## 2018-02-14 PROCEDURE — 36415 COLL VENOUS BLD VENIPUNCTURE: CPT

## 2018-02-14 PROCEDURE — 83036 HEMOGLOBIN GLYCOSYLATED A1C: CPT

## 2018-02-14 PROCEDURE — 85025 COMPLETE CBC W/AUTO DIFF WBC: CPT

## 2018-02-14 RX ORDER — AMLODIPINE BESYLATE 5 MG/1
5 TABLET ORAL DAILY
Qty: 90 TAB | Refills: 1 | Status: SHIPPED | OUTPATIENT
Start: 2018-02-14 | End: 2018-06-06 | Stop reason: ALTCHOICE

## 2018-02-14 NOTE — PROGRESS NOTES
HISTORY OF PRESENT ILLNESS  Denis Aj is a 79 y.o. female. HPI  Romi Ya comes in. She has been feeling off balance. She did see Dr. Morales Pabon, who did not think it was vestibular. Her blood pressure is low on Norvasc 7.5 with Benicar, HCTZ and I am going to drop the Norvasc back to 5 mg. She was treated for chronic sinusitis. She had a head CT with Dr. Morales Pabon at the end of January, which showed mild mucosal disease only. No other abnormalities. She notes that at the very beginning of February, she had a fever of 103. It has since come down to around 100 and she is taking Tylenol at night, but currently not in the day. She is fatigued. She has no focal symptoms, other than fatigue. She denies any recent GI or pulmonary symptoms. She fears her sugars will be high because she has been on Prednisone. Review of Systems   Constitutional: Positive for malaise/fatigue. Negative for chills, fever and weight loss. Respiratory: Negative for cough, shortness of breath and wheezing. Cardiovascular: Negative for chest pain, palpitations, orthopnea, leg swelling and PND. Gastrointestinal: Negative for abdominal pain, heartburn, nausea and vomiting. Musculoskeletal: Negative for joint pain and myalgias. Neurological: Positive for dizziness. Negative for headaches. Physical Exam   Constitutional: She is oriented to person, place, and time. She appears well-developed and well-nourished. HENT:   Head: Normocephalic and atraumatic. Right Ear: External ear normal.   Left Ear: External ear normal.   Mouth/Throat: Oropharynx is clear and moist.   Neck: Normal range of motion. Neck supple. Carotid bruit is not present. No thyromegaly present. Cardiovascular: Normal rate, regular rhythm, S1 normal, S2 normal, normal heart sounds and intact distal pulses. No murmur heard. Pulmonary/Chest: Effort normal and breath sounds normal. No respiratory distress. She has no wheezes. She has no rales. Musculoskeletal: She exhibits no edema. Lymphadenopathy:     She has no cervical adenopathy. Neurological: She is alert and oriented to person, place, and time. Skin: No rash noted. Psychiatric: She has a normal mood and affect. Her behavior is normal.   Nursing note and vitals reviewed. ASSESSMENT and PLAN  Diagnoses and all orders for this visit:    1. Essential hypertension with goal blood pressure less than 130/80-dec from 7.5 mg to 5mg every day appt in 1 mo  -     amLODIPine (NORVASC) 5 mg tablet; Take 1 Tab by mouth daily. 2. Type 2 diabetes mellitus with hyperglycemia, without long-term current use of insulin (HCC)  -     HEMOGLOBIN A1C WITH EAG  -     MICROALBUMIN, UR, RAND W/ MICROALBUMIN/CREA RATIO    3. Fever in other diseases-at beginning of month now resolved-advised follow and if temp over 100 seek appt asap  Suspect had flu   -     CBC WITH AUTOMATED DIFF    4.  Hypercholesterolemia  -     METABOLIC PANEL, COMPREHENSIVE  -     LIPID PANEL

## 2018-02-14 NOTE — MR AVS SNAPSHOT
[facility] 
 
 
 [address] [place] [address] [facility] 
[phone] Patient: Rito Monterroso MRN:  [id number] Visit Information Date & Time Provider Department Dept. Phone Encounter #  
 2/14/2018  9:15 AM Lillie Hsu MD [facility] [address] [facility] [id number] Upcoming Health Maintenance Date Due DTaP/Tdap/Td series (1 - Tdap) 4/6/2007 OSTEOPOROSIS SCREENING (DEXA) 12/20/2015 BREAST CANCER SCRN MAMMOGRAM 6/4/2017 EYE EXAM RETINAL OR DILATED Q1 2/7/2018 HEMOGLOBIN A1C Q6M 2/14/2018 FOOT EXAM Q1 8/14/2018 MICROALBUMIN Q1 8/14/2018 LIPID PANEL Q1 8/14/2018 MEDICARE YEARLY EXAM 8/15/2018 Pneumococcal 65+ Low/Medium Risk (2 of 2 - PPSV23) 8/6/2019 GLAUCOMA SCREENING Q2Y 8/8/2019 COLONOSCOPY 11/6/2019 Allergies as of 2/14/2018  Review Complete On: 2/14/2018 By: Lillie Hsu MD  
  
 Severity Noted Reaction Type Reactions Latex  01/15/2009    Swelling SWELLING TONGUE, ORAL CAVITY Benadryl [Diphenhydramine Hcl] Medium 03/07/2011   Systemic Unknown (comments) Raises heart rate Atropine  01/15/2009    Swelling Eye swelling Chocolate [Cocoa]  03/27/2017    Rash Compazine [Prochlorperazine Edisylate]  01/15/2009    Other (comments) Muscle spasm, POSSIBLE SEIZURES Cymbalta [Duloxetine]  03/14/2017    Vertigo Diclofenac Sodium (Bulk)  11/13/2012    Rash Milk  03/27/2017    Rash Phenobarbital  01/15/2009    Other (comments)  
 tachy Current Immunizations  Reviewed on 8/14/2017 Name Date Influenza High Dose Vaccine PF 11/2/2017 12:00 AM, 10/6/2016 Pneumococcal Conjugate (PCV-13) 5/25/2016 Pneumococcal Polysaccharide (PPSV-23) 8/6/2014 Td 4/5/2007 Varicella Virus Vaccine Live 2/1/2011 ZZZ-RETIRED (DO NOT USE) Pneumococcal Vaccine (Unspecified Type) 2/10/2011 10:59 AM  
 Zoster Vaccine, Live 1/1/2011 Not reviewed this visit You Were Diagnosed With   
  
 Codes Comments Essential hypertension with goal blood pressure less than 130/80    -  Primary ICD-10-CM: I10 
ICD-9-CM: 401.9 Type 2 diabetes mellitus with hyperglycemia, without long-term current use of insulin (HCC)     ICD-10-CM: E11.65 ICD-9-CM: 250.00, 790.29 Fever in other diseases     ICD-10-CM: R50.81 ICD-9-CM: 780.61 Hypercholesterolemia     ICD-10-CM: E78.00 ICD-9-CM: 272.0 Vitals BP Pulse Temp Resp Height(growth percentile) Weight(growth percentile) 105/73 (BP 1 Location: Left arm, BP Patient Position: Sitting) (!) 105 99.2 °F (37.3 °C) (Oral) 18 5' 2\" (1.575 m) 174 lb (78.9 kg) SpO2 BMI OB Status Smoking Status 98% 31.83 kg/m2 Hysterectomy Never Smoker Vitals History BMI and BSA Data Body Mass Index Body Surface Area  
 31.83 kg/m 2 1.86 m 2 Preferred Pharmacy Pharmacy Name Phone [facility] [address] [facility], [facility] AT [address] [place] [address] [phone] Your Updated Medication List  
  
   
This list is accurate as of: 2/14/18  9:38 AM.  Always use your most recent med list.  
  
  
  
  
 albuterol 90 mcg/actuation inhaler Commonly known as:  PROVENTIL HFA, VENTOLIN HFA, PROAIR HFA Take 1 Puff by inhalation every six (6) hours as needed. amLODIPine 5 mg tablet Commonly known as:  Claudell Bhupinder Take 1 Tab by mouth daily. atorvastatin 20 mg tablet Commonly known as:  LIPITOR  
TAKE 1 TABLET BY MOUTH EVERY EVENING  
  
 fluticasone 50 mcg/actuation nasal spray Commonly known as:  FLONASE  
2 sprays by Both Nostrils route daily. folic acid 1 mg tablet Commonly known as:  Google Take 2 Tabs by mouth daily. melatonin 5 mg Cap capsule Take 5 mg by mouth nightly. metFORMIN  mg tablet Commonly known as:  GLUCOPHAGE XR  
TAKE 1 TABLET BY MOUTH EVERY DAY WITH DINNER  
  
 methotrexate 2.5 mg tablet Commonly known as:  Shelvy Hussar Take 6 Tabs by mouth Every Friday. montelukast 10 mg tablet Commonly known as:  SINGULAIR  
TAKE 1 TABLET BY MOUTH EVERY DAY  
  
 olmesartan-hydroCHLOROthiazide 40-25 mg per tablet Commonly known as:  BENICAR HCT  
TAKE 1 TABLET BY MOUTH DAILY  
  
 oxyCODONE IR 5 mg immediate release tablet Commonly known as:  Cliffton Boatman Take 1-2 Tabs by mouth every four (4) hours as needed for Pain. Max Daily Amount: 60 mg.  
  
 prednisoLONE acetate 1 % ophthalmic suspension Commonly known as:  PRED FORTE Administer 1 Drop to both eyes as needed. raNITIdine 150 mg tablet Commonly known as:  ZANTAC Take 1 Tab by mouth two (2) times a day. TYLENOL 325 mg tablet Generic drug:  acetaminophen Take  by mouth every four (4) hours as needed for Pain. Prescriptions Sent to Pharmacy Refills  
 amLODIPine (NORVASC) 5 mg tablet 1 Sig: Take 1 Tab by mouth daily. Class: Normal  
 Pharmacy: [facility] [address] [place] [address], [address] [place] [address] [facility] [phone] Route: Oral  
  
We Performed the Following CBC WITH AUTOMATED DIFF [93842 CPT(R)] HEMOGLOBIN A1C WITH EAG [16386 CPT(R)] LIPID PANEL [41805 CPT(R)] METABOLIC PANEL, COMPREHENSIVE [10503 CPT(R)] MICROALBUMIN, UR, RAND W/ MICROALBUMIN/CREA RATIO [id number] CPT(R)] [facility] & HEALTH SERVICES! Dear Yumi Barajas: 
Thank you for requesting a Apieron account. Our records indicate that you already have an active Apieron account. You can access your account anytime at [email or web address] Did you know that you can access your hospital and ER discharge instructions at any time in Apieron? You can also review all of your test results from your hospital stay or ER visit. Additional Information If you have questions, please visit the Frequently Asked Questions section of the Clipabout website at [email or web address] Remember, Clipabout is NOT to be used for urgent needs. For medical emergencies, dial 911. Now available from your iPhone and Android! Please provide this summary of care documentation to your next provider. Your primary care clinician is listed as Chava 68. If you have any questions after today's visit, please call [phone].

## 2018-02-15 ENCOUNTER — TELEPHONE (OUTPATIENT)
Dept: INTERNAL MEDICINE CLINIC | Age: 68
End: 2018-02-15

## 2018-02-15 LAB
ALBUMIN SERPL-MCNC: 4 G/DL (ref 3.6–4.8)
ALBUMIN/CREAT UR: 13.7 MG/G CREAT (ref 0–30)
ALBUMIN/GLOB SERPL: 1.7 {RATIO} (ref 1.2–2.2)
ALP SERPL-CCNC: 74 IU/L (ref 39–117)
ALT SERPL-CCNC: 49 IU/L (ref 0–32)
AST SERPL-CCNC: 21 IU/L (ref 0–40)
BASOPHILS # BLD AUTO: 0 X10E3/UL (ref 0–0.2)
BASOPHILS NFR BLD AUTO: 0 %
BILIRUB SERPL-MCNC: 0.8 MG/DL (ref 0–1.2)
BUN SERPL-MCNC: 16 MG/DL (ref 8–27)
BUN/CREAT SERPL: 18 (ref 12–28)
CALCIUM SERPL-MCNC: 9.2 MG/DL (ref 8.7–10.3)
CHLORIDE SERPL-SCNC: 96 MMOL/L (ref 96–106)
CHOLEST SERPL-MCNC: 166 MG/DL (ref 100–199)
CO2 SERPL-SCNC: 20 MMOL/L (ref 18–29)
CREAT SERPL-MCNC: 0.9 MG/DL (ref 0.57–1)
CREAT UR-MCNC: 322.8 MG/DL
EOSINOPHIL # BLD AUTO: 0.2 X10E3/UL (ref 0–0.4)
EOSINOPHIL NFR BLD AUTO: 3 %
ERYTHROCYTE [DISTWIDTH] IN BLOOD BY AUTOMATED COUNT: 15.2 % (ref 12.3–15.4)
EST. AVERAGE GLUCOSE BLD GHB EST-MCNC: 246 MG/DL
GFR SERPLBLD CREATININE-BSD FMLA CKD-EPI: 66 ML/MIN/1.73
GFR SERPLBLD CREATININE-BSD FMLA CKD-EPI: 77 ML/MIN/1.73
GLOBULIN SER CALC-MCNC: 2.4 G/DL (ref 1.5–4.5)
GLUCOSE SERPL-MCNC: 256 MG/DL (ref 65–99)
HBA1C MFR BLD: 10.2 % (ref 4.8–5.6)
HCT VFR BLD AUTO: 40.4 % (ref 34–46.6)
HDLC SERPL-MCNC: 31 MG/DL
HGB BLD-MCNC: 14 G/DL (ref 11.1–15.9)
IMM GRANULOCYTES # BLD: 0 X10E3/UL (ref 0–0.1)
IMM GRANULOCYTES NFR BLD: 0 %
INTERPRETATION, 910389: NORMAL
LDLC SERPL CALC-MCNC: 81 MG/DL (ref 0–99)
LYMPHOCYTES # BLD AUTO: 1.9 X10E3/UL (ref 0.7–3.1)
LYMPHOCYTES NFR BLD AUTO: 24 %
Lab: NORMAL
MCH RBC QN AUTO: 32.5 PG (ref 26.6–33)
MCHC RBC AUTO-ENTMCNC: 34.7 G/DL (ref 31.5–35.7)
MCV RBC AUTO: 94 FL (ref 79–97)
MICROALBUMIN UR-MCNC: 44.2 UG/ML
MONOCYTES # BLD AUTO: 0.8 X10E3/UL (ref 0.1–0.9)
MONOCYTES NFR BLD AUTO: 11 %
NEUTROPHILS # BLD AUTO: 4.9 X10E3/UL (ref 1.4–7)
NEUTROPHILS NFR BLD AUTO: 62 %
PLATELET # BLD AUTO: 326 X10E3/UL (ref 150–379)
POTASSIUM SERPL-SCNC: 4 MMOL/L (ref 3.5–5.2)
PROT SERPL-MCNC: 6.4 G/DL (ref 6–8.5)
RBC # BLD AUTO: 4.31 X10E6/UL (ref 3.77–5.28)
SODIUM SERPL-SCNC: 140 MMOL/L (ref 134–144)
TRIGL SERPL-MCNC: 270 MG/DL (ref 0–149)
VLDLC SERPL CALC-MCNC: 54 MG/DL (ref 5–40)
WBC # BLD AUTO: 7.8 X10E3/UL (ref 3.4–10.8)

## 2018-02-15 NOTE — TELEPHONE ENCOUNTER
Discussed hgba1c of 10%-she has been on steroids and only on metformin  Change to janumet and appt in 1 mo with glucose log qam and off steroids-she agrees

## 2018-02-16 ENCOUNTER — TELEPHONE (OUTPATIENT)
Dept: INTERNAL MEDICINE CLINIC | Age: 68
End: 2018-02-16

## 2018-02-16 RX ORDER — LANCETS
EACH MISCELLANEOUS
Qty: 100 EACH | Refills: 3 | Status: SHIPPED | OUTPATIENT
Start: 2018-02-16 | End: 2022-11-01

## 2018-02-16 RX ORDER — INSULIN PUMP SYRINGE, 3 ML
EACH MISCELLANEOUS
Qty: 1 KIT | Refills: 0 | Status: SHIPPED | OUTPATIENT
Start: 2018-02-16

## 2018-02-16 RX ORDER — METFORMIN HYDROCHLORIDE 500 MG/1
TABLET, EXTENDED RELEASE ORAL
Qty: 90 TAB | Refills: 1 | Status: SHIPPED | OUTPATIENT
Start: 2018-02-16 | End: 2018-03-06 | Stop reason: SDUPTHER

## 2018-02-16 NOTE — TELEPHONE ENCOUNTER
Patient's  called stating his wife needs the free style brand for her meter. Patient's  states Dr Darrell Deutsch is  waiting on info in order to send script.     Any questions patient can be reached at [phone]

## 2018-02-16 NOTE — TELEPHONE ENCOUNTER
calls to request meds in  Separate orders for [place] [facility] and Amo and metformin as pharmacy has informed him that this is cheaper than the combination product janumet  He will work with pharmacy on getting a new meter per coverage with her insurance

## 2018-02-16 NOTE — TELEPHONE ENCOUNTER
[facility] # [id number] request a return call regarding alternative medication for Rx Janumet [facility] contact [phone]

## 2018-02-19 ENCOUNTER — TELEPHONE (OUTPATIENT)
Dept: INTERNAL MEDICINE CLINIC | Age: 68
End: 2018-02-19

## 2018-02-19 NOTE — TELEPHONE ENCOUNTER
Pt call in to report     BP 90-64  HR 64 at Dr Pranav kemp office this morning and wanted her to call to notify Dr Liudmila Freitas

## 2018-02-20 NOTE — TELEPHONE ENCOUNTER
Per PCP notified patient to hold her amlodipine and follow up in 10-14 days. Patient's appt has been scheduled for March 6th at 3:00.

## 2018-03-06 ENCOUNTER — OFFICE VISIT (OUTPATIENT)
Dept: INTERNAL MEDICINE CLINIC | Age: 68
End: 2018-03-06

## 2018-03-06 VITALS
HEART RATE: 79 BPM | DIASTOLIC BLOOD PRESSURE: 65 MMHG | BODY MASS INDEX: 32.17 KG/M2 | SYSTOLIC BLOOD PRESSURE: 111 MMHG | HEIGHT: 62 IN | TEMPERATURE: 98.1 F | RESPIRATION RATE: 16 BRPM | WEIGHT: 174.8 LBS | OXYGEN SATURATION: 98 %

## 2018-03-06 DIAGNOSIS — E11.65 TYPE 2 DIABETES MELLITUS WITH HYPERGLYCEMIA, WITHOUT LONG-TERM CURRENT USE OF INSULIN (HCC): Primary | ICD-10-CM

## 2018-03-06 DIAGNOSIS — I10 ESSENTIAL HYPERTENSION: ICD-10-CM

## 2018-03-06 DIAGNOSIS — E78.00 HYPERCHOLESTEROLEMIA: ICD-10-CM

## 2018-03-06 RX ORDER — METFORMIN HYDROCHLORIDE 500 MG/1
TABLET, EXTENDED RELEASE ORAL
Qty: 90 TAB | Refills: 1
Start: 2018-03-06 | End: 2018-12-06 | Stop reason: SDUPTHER

## 2018-03-06 RX ORDER — METFORMIN HYDROCHLORIDE 500 MG/1
TABLET, EXTENDED RELEASE ORAL
Qty: 30 TAB | Refills: 0 | Status: SHIPPED | OUTPATIENT
Start: 2018-03-06 | End: 2018-03-06 | Stop reason: ALTCHOICE

## 2018-03-06 NOTE — PROGRESS NOTES
HISTORY OF PRESENT ILLNESS  Marika Boyer is a 79 y.o. female. HPI  Seen for follow up on diabetes. A1c was 10% in February. She had only been on metformin. We added Januvia 100 mg a day and she is trying to take three Metformin a day. She is having fairly frequent episodes of diarrhea. Had to take Imodium yesterday. Otherwise tolerating the meds. Her sugars were 188 at the beginning and now they are in the 110s. She had one reading of 65 when she skipped lunch. She has been to a nutrition class in the past, but is open for further education. She admits to some dietary noncompliance. She is not particularly active, limited by her arthritis. Review of Systems   Constitutional: Positive for malaise/fatigue. Negative for chills, fever and weight loss. Respiratory: Negative for cough, shortness of breath and wheezing. Cardiovascular: Negative for chest pain, palpitations, orthopnea, leg swelling and PND. Gastrointestinal: Positive for diarrhea. Negative for abdominal pain, heartburn and nausea. Musculoskeletal: Negative for myalgias. Neurological: Negative for dizziness and headaches. Physical Exam   Constitutional: She is oriented to person, place, and time. She appears well-developed and well-nourished. HENT:   Head: Normocephalic and atraumatic. Neck: Normal range of motion. Neck supple. Carotid bruit is not present. No thyromegaly present. Cardiovascular: Normal rate, regular rhythm, S1 normal, S2 normal, normal heart sounds and intact distal pulses. No murmur heard. Pulmonary/Chest: Effort normal and breath sounds normal. No respiratory distress. She has no wheezes. She has no rales. Musculoskeletal: She exhibits no edema. Neurological: She is alert and oriented to person, place, and time. Psychiatric: She has a normal mood and affect. Her behavior is normal.   Nursing note and vitals reviewed. ASSESSMENT and PLAN  Diagnoses and all orders for this visit:    1. Type 2 diabetes mellitus with hyperglycemia, without long-term current use of insulin (HCC)  -     metFORMIN ER (GLUCOPHAGE XR) 500 mg tablet; 2 po every day with dinner  -     HEMOGLOBIN A1C WITH EAG  -     METABOLIC PANEL, COMPREHENSIVE  -     MICROALBUMIN, UR, RAND W/ MICROALB/CREAT RATIO    2. Hypercholesterolemia    3.  Essential hypertension      the following changes in treatment are made: dec from 3 to 2 metformin per day due to diarrhea  Refer to education with marium casanova in may and labs prior

## 2018-03-06 NOTE — MR AVS SNAPSHOT
[facility] 
 
 
 [address] [facility] [address] [place] [address] [facility] 
[phone] Patient: Kirsten Lake MRN:  [id number] Visit Information Date & Time Provider Department Dept. Phone Encounter #  
 3/6/2018  3:00 PM Amelia Ernst MD [facility] [address] [facility] [id number] Follow-up Instructions Routing History Upcoming Health Maintenance Date Due DTaP/Tdap/Td series (1 - Tdap) 4/6/2007 OSTEOPOROSIS SCREENING (DEXA) 12/20/2015 BREAST CANCER SCRN MAMMOGRAM 6/4/2017 EYE EXAM RETINAL OR DILATED Q1 2/7/2018 FOOT EXAM Q1 8/14/2018 HEMOGLOBIN A1C Q6M 8/14/2018 MEDICARE YEARLY EXAM 8/15/2018 MICROALBUMIN Q1 2/14/2019 LIPID PANEL Q1 2/14/2019 Pneumococcal 65+ Low/Medium Risk (2 of 2 - PPSV23) 8/6/2019 GLAUCOMA SCREENING Q2Y 8/8/2019 COLONOSCOPY 11/6/2019 Allergies as of 3/6/2018  Review Complete On: 3/6/2018 By: Amelia Ernst MD  
  
 Severity Noted Reaction Type Reactions Latex  01/15/2009    Swelling SWELLING TONGUE, ORAL CAVITY Benadryl [Diphenhydramine Hcl] Medium 03/07/2011   Systemic Unknown (comments) Raises heart rate Atropine  01/15/2009    Swelling Eye swelling Chocolate [Cocoa]  03/27/2017    Rash Compazine [Prochlorperazine Edisylate]  01/15/2009    Other (comments) Muscle spasm, POSSIBLE SEIZURES Cymbalta [Duloxetine]  03/14/2017    Vertigo Diclofenac Sodium (Bulk)  11/13/2012    Rash Milk  03/27/2017    Rash Phenobarbital  01/15/2009    Other (comments)  
 tachy Current Immunizations  Reviewed on 8/14/2017 Name Date Influenza High Dose Vaccine PF 11/2/2017 12:00 AM, 10/6/2016 Pneumococcal Conjugate (PCV-13) 5/25/2016 Pneumococcal Polysaccharide (PPSV-23) 8/6/2014 Td 4/5/2007 Varicella Virus Vaccine Live 2/1/2011  ZZZ-RETIRED (DO NOT USE) Pneumococcal Vaccine (Unspecified Type) 2/10/2011 10:59 AM  
 Zoster Vaccine, Live 1/1/2011 Not reviewed this visit You Were Diagnosed With   
  
 Codes Comments Type 2 diabetes mellitus with hyperglycemia, without long-term current use of insulin (HCC)    -  Primary ICD-10-CM: E11.65 ICD-9-CM: 250.00, 790.29 Hypercholesterolemia     ICD-10-CM: E78.00 ICD-9-CM: 272.0 Essential hypertension     ICD-10-CM: I10 
ICD-9-CM: 401.9 Vitals BP Pulse Temp Resp Height(growth percentile) Weight(growth percentile) 111/65 (BP 1 Location: Left arm, BP Patient Position: Sitting) 79 98.1 °F (36.7 °C) (Oral) 16 5' 2\" (1.575 m) 174 lb 12.8 oz (79.3 kg) SpO2 BMI OB Status Smoking Status 98% 31.97 kg/m2 Hysterectomy Never Smoker Vitals History BMI and BSA Data Body Mass Index Body Surface Area  
 31.97 kg/m 2 1.86 m 2 Preferred Pharmacy Pharmacy Name Phone [facility] [address] [place] [address], [address] [place] [address] AT [address] [place] [address] [phone] Your Updated Medication List  
  
   
This list is accurate as of 3/6/18  3:27 PM.  Always use your most recent med list.  
  
  
  
  
 albuterol 90 mcg/actuation inhaler Commonly known as:  PROVENTIL HFA, VENTOLIN HFA, PROAIR HFA Take 1 Puff by inhalation every six (6) hours as needed. amLODIPine 5 mg tablet Commonly known as:  Ardeen Squire Take 1 Tab by mouth daily. atorvastatin 20 mg tablet Commonly known as:  LIPITOR  
TAKE 1 TABLET BY MOUTH EVERY EVENING Blood-Glucose Meter monitoring kit Commonly known as:  FREESTYLE LITE METER Use to test blood sugar once a day. Dx: E11.65  
  
 fluticasone 50 mcg/actuation nasal spray Commonly known as:  FLONASE  
2 sprays by Both Nostrils route daily. folic acid 1 mg tablet Commonly known as:  Google Take 2 Tabs by mouth daily. glucose blood VI test strips strip Commonly known as:  FREESTYLE LITE STRIPS Use to test blood sugar once daily.  Dx: E11.65  
  
 Lancets Misc Use to test blood sugar once daily. Dx: E11.65  
  
 melatonin 5 mg Cap capsule Take 5 mg by mouth nightly. metFORMIN  mg tablet Commonly known as:  GLUCOPHAGE XR  
2 po every day with dinner  
  
 methotrexate 2.5 mg tablet Commonly known as:  Kitty Hawk Gelineau Take 6 Tabs by mouth Every Friday. montelukast 10 mg tablet Commonly known as:  SINGULAIR  
TAKE 1 TABLET BY MOUTH EVERY DAY  
  
 olmesartan-hydroCHLOROthiazide 40-25 mg per tablet Commonly known as:  BENICAR HCT  
TAKE 1 TABLET BY MOUTH DAILY  
  
 oxyCODONE IR 5 mg immediate release tablet Commonly known as:  Verneta Jaegers Take 1-2 Tabs by mouth every four (4) hours as needed for Pain. Max Daily Amount: 60 mg.  
  
 prednisoLONE acetate 1 % ophthalmic suspension Commonly known as:  PRED FORTE Administer 1 Drop to both eyes as needed. raNITIdine 150 mg tablet Commonly known as:  ZANTAC Take 1 Tab by mouth two (2) times a day. SITagliptin 100 mg tablet Commonly known as:  Rose Raker Take 1 Tab by mouth daily. TYLENOL 325 mg tablet Generic drug:  acetaminophen Take  by mouth every four (4) hours as needed for Pain. We Performed the Following HEMOGLOBIN A1C WITH EAG [39952 CPT(R)] METABOLIC PANEL, COMPREHENSIVE [58538 CPT(R)] MICROALBUMIN, UR, RAND W/ MICROALB/CREAT RATIO [id number] CPT(R)] Introducing [facility] & HEALTH SERVICES! Dear Amanda Mckeon: 
Thank you for requesting a Saborstudio account. Our records indicate that you already have an active Saborstudio account. You can access your account anytime at [email or web address] Did you know that you can access your hospital and ER discharge instructions at any time in Saborstudio? You can also review all of your test results from your hospital stay or ER visit. Additional Information If you have questions, please visit the Frequently Asked Questions section of the [employer] website at [email or web address]. [email or web address] Remember, [employer] is NOT to be used for urgent needs. For medical emergencies, dial 911. Now available from your iPhone and Android! Please provide this summary of care documentation to your next provider. Your primary care clinician is listed as Chava 68. If you have any questions after today's visit, please call [phone].

## 2018-04-04 RX ORDER — ATORVASTATIN CALCIUM 20 MG/1
TABLET, FILM COATED ORAL
Qty: 30 TAB | Refills: 5 | Status: SHIPPED | OUTPATIENT
Start: 2018-04-04 | End: 2019-04-14 | Stop reason: SDUPTHER

## 2018-04-07 RX ORDER — SITAGLIPTIN 100 MG/1
TABLET, FILM COATED ORAL
Qty: 30 TAB | Refills: 0 | Status: SHIPPED | OUTPATIENT
Start: 2018-04-07 | End: 2018-05-02 | Stop reason: SDUPTHER

## 2018-04-12 ENCOUNTER — OFFICE VISIT (OUTPATIENT)
Dept: INTERNAL MEDICINE CLINIC | Age: 68
End: 2018-04-12

## 2018-04-12 DIAGNOSIS — E11.65 TYPE 2 DIABETES MELLITUS WITH HYPERGLYCEMIA, WITHOUT LONG-TERM CURRENT USE OF INSULIN (HCC): Primary | ICD-10-CM

## 2018-04-12 NOTE — PROGRESS NOTES
CC:  Diabetes management/education    S/O: Lucien Thompson is a 79 y.o. female referred by Dr. Eugene Hanson MD for diabetes management. HPI: Patient presents today for her initial visit with me for diabetes education. Patient comes today with her  who does the driving. Patient has ankylosing spondylitis which limits certain activities she can perform. Current Diabetes Regimen:  Metformin ER 500mg 2 tabs daily  Januvia 100mg daily    ROS:   Patient denies hypoglycemic and hyperglycemic signs/symptoms, chest pain, shortness of breath, neuropathy, vision changes, and any foot changes. Self-Monitoring Blood Glucose:  First thing in the morning- 120-130  Throughout the day - mid afternoon 140    Diet:  Breakfast: often goes out to eat, really stiff in the morning, 1-2 eggs scrambled, 2 pieces of toast and butter  At home: english muffin with cheese or peanut butter; boiled eggs  Lunch: sandwich out - something small at [employer] or fish sandwich with lettuce and tomato, no fries, or at subway they split a turkey sub with lots of veggies, raw veggie pack  Dinner: stir templeton with brown rice and chicken; Snacks- popcorn, peanuts, crackers (triscuits and wheat thins)    Exercise:   trying to exercise more  She walks around hte block- knee replaced last year, so she can walk        Data reviewed:  Lab Results   Component Value Date/Time    Hemoglobin A1c 10.2 (H) 02/14/2018 10:00 AM         Diabetes Health Maintenance:  Last eye exam:2/7/17  Last foot exam: 8/4/2017  Last influenza vaccine: 2017  Last Pneumovax 23 vaccine: 2014  Last Prevnar-13 vaccine: 2016  Hepatitis B Series: unknown  ASA Therapy:none  ACE/ARB Therapy: Olmesartan  Statin Therapy: Atorvastatin    Assessment/Plan:     1. Diabetes:  a1c not at goal <7% and patient to have it rechecked in may. Patients fasting blood sugars are near goal range of 80 to 130 and have definitely improved.  Spent a long time discussing healthy meal planning, the plate method, importance of increasing non-starchy vegetables and protein. Patients diabetes is not her top concern given other medical problems but I did emphasize the importance of us keeping her relatively controlled to prevent future complications and so that she feels well. Emphasized the benefit of getting in some exercise, even if just short walks throughout the day. Encouraged her to continue Januvia and Metformin for now and will follow up after her labs since harder for patient to get into the office. Patient advised to call me with any questions or concerns. Patient verbalized understanding of the information presented and all of the patients questions were answered. AVS was handed to the patient and information reviewed. Patient advised to call me or Dr. Bryce Rajput MD with any additional questions or concerns. Follow-up: June after a1c  Notification of recommendations will be sent to Dr. Bryce Rajput MD for review.       Walt Lindsey, PharmD, BCPS, CDE

## 2018-05-02 ENCOUNTER — TELEPHONE (OUTPATIENT)
Dept: INTERNAL MEDICINE CLINIC | Age: 68
End: 2018-05-02

## 2018-05-02 RX ORDER — SITAGLIPTIN 100 MG/1
TABLET, FILM COATED ORAL
Qty: 30 TAB | Refills: 5 | Status: SHIPPED | OUTPATIENT
Start: 2018-05-02 | End: 2018-06-06 | Stop reason: SDUPTHER

## 2018-05-02 NOTE — TELEPHONE ENCOUNTER
Patient's dermatologist Caleb Bravo prescribed Terbinafine 250 mg to use daily for a toenail fungus the patient has. Patient wants to be clear she can take this & it will not interfere with her other medicines. She states she will not pick the medicine up unless PCP says it's okay to take.

## 2018-05-02 NOTE — TELEPHONE ENCOUNTER
Patient states she was given a new medication called Terbinafine  250 mg to help with toenail fungus. Patient is requesting to speak with the nurse to make sure it's okay for her to take medication.   She can be reached at [phone]

## 2018-05-07 NOTE — TELEPHONE ENCOUNTER
Spoke with patient's  on Friday notifying him of PCP's recommendation regarding the Terbinafine. He advised he would discuss 's recommendation with his wife.

## 2018-05-19 RX ORDER — METFORMIN HYDROCHLORIDE 500 MG/1
TABLET, EXTENDED RELEASE ORAL
Qty: 30 TAB | Refills: 0 | Status: SHIPPED | OUTPATIENT
Start: 2018-05-19 | End: 2018-06-06 | Stop reason: SDUPTHER

## 2018-05-29 ENCOUNTER — HOSPITAL ENCOUNTER (OUTPATIENT)
Dept: LAB | Age: 68
Discharge: HOME | End: 2018-05-29
Payer: MEDICARE

## 2018-05-29 PROCEDURE — 36415 COLL VENOUS BLD VENIPUNCTURE: CPT

## 2018-05-29 PROCEDURE — 80053 COMPREHEN METABOLIC PANEL: CPT

## 2018-05-29 PROCEDURE — 83036 HEMOGLOBIN GLYCOSYLATED A1C: CPT

## 2018-05-29 PROCEDURE — 82043 UR ALBUMIN QUANTITATIVE: CPT

## 2018-05-30 LAB
ALBUMIN SERPL-MCNC: 4.4 G/DL (ref 3.6–4.8)
ALBUMIN/CREAT UR: 7.3 MG/G CREAT (ref 0–30)
ALBUMIN/GLOB SERPL: 2 {RATIO} (ref 1.2–2.2)
ALP SERPL-CCNC: 55 IU/L (ref 39–117)
ALT SERPL-CCNC: 24 IU/L (ref 0–32)
AST SERPL-CCNC: 16 IU/L (ref 0–40)
BILIRUB SERPL-MCNC: 0.5 MG/DL (ref 0–1.2)
BUN SERPL-MCNC: 15 MG/DL (ref 8–27)
BUN/CREAT SERPL: 17 (ref 12–28)
CALCIUM SERPL-MCNC: 10 MG/DL (ref 8.7–10.3)
CHLORIDE SERPL-SCNC: 103 MMOL/L (ref 96–106)
CO2 SERPL-SCNC: 25 MMOL/L (ref 18–29)
CREAT SERPL-MCNC: 0.9 MG/DL (ref 0.57–1)
CREAT UR-MCNC: 182.9 MG/DL
EST. AVERAGE GLUCOSE BLD GHB EST-MCNC: 148 MG/DL
GFR SERPLBLD CREATININE-BSD FMLA CKD-EPI: 66 ML/MIN/1.73
GFR SERPLBLD CREATININE-BSD FMLA CKD-EPI: 77 ML/MIN/1.73
GLOBULIN SER CALC-MCNC: 2.2 G/DL (ref 1.5–4.5)
GLUCOSE SERPL-MCNC: 161 MG/DL (ref 65–99)
HBA1C MFR BLD: 6.8 % (ref 4.8–5.6)
MICROALBUMIN UR-MCNC: 13.3 UG/ML
POTASSIUM SERPL-SCNC: 3.9 MMOL/L (ref 3.5–5.2)
PROT SERPL-MCNC: 6.6 G/DL (ref 6–8.5)
SODIUM SERPL-SCNC: 144 MMOL/L (ref 134–144)

## 2018-06-06 ENCOUNTER — OFFICE VISIT (OUTPATIENT)
Dept: INTERNAL MEDICINE CLINIC | Age: 68
End: 2018-06-06

## 2018-06-06 VITALS
WEIGHT: 177 LBS | RESPIRATION RATE: 16 BRPM | HEART RATE: 83 BPM | OXYGEN SATURATION: 98 % | HEIGHT: 62 IN | SYSTOLIC BLOOD PRESSURE: 123 MMHG | DIASTOLIC BLOOD PRESSURE: 74 MMHG | BODY MASS INDEX: 32.57 KG/M2 | TEMPERATURE: 98.3 F

## 2018-06-06 DIAGNOSIS — Z12.39 BREAST CANCER SCREENING: ICD-10-CM

## 2018-06-06 DIAGNOSIS — Z78.0 POSTMENOPAUSAL: ICD-10-CM

## 2018-06-06 DIAGNOSIS — E11.65 TYPE 2 DIABETES MELLITUS WITH HYPERGLYCEMIA, WITHOUT LONG-TERM CURRENT USE OF INSULIN (HCC): Primary | ICD-10-CM

## 2018-06-06 DIAGNOSIS — I10 ESSENTIAL HYPERTENSION WITH GOAL BLOOD PRESSURE LESS THAN 130/80: ICD-10-CM

## 2018-06-06 DIAGNOSIS — J06.9 RECURRENT URI (UPPER RESPIRATORY INFECTION): ICD-10-CM

## 2018-06-06 RX ORDER — METFORMIN HYDROCHLORIDE 500 MG/1
1000 TABLET, EXTENDED RELEASE ORAL
Qty: 60 TAB | Refills: 5 | Status: SHIPPED | OUTPATIENT
Start: 2018-06-06 | End: 2018-10-11 | Stop reason: SDUPTHER

## 2018-06-06 RX ORDER — MONTELUKAST SODIUM 10 MG/1
TABLET ORAL
Qty: 30 TAB | Refills: 11 | Status: SHIPPED | OUTPATIENT
Start: 2018-06-06 | End: 2018-10-11 | Stop reason: SDUPTHER

## 2018-06-06 RX ORDER — OLMESARTAN MEDOXOMIL AND HYDROCHLOROTHIAZIDE 40/25 40; 25 MG/1; MG/1
TABLET ORAL
Qty: 30 TAB | Refills: 6 | Status: SHIPPED | OUTPATIENT
Start: 2018-06-06 | End: 2019-01-03 | Stop reason: SDUPTHER

## 2018-06-06 NOTE — PROGRESS NOTES
HISTORY OF PRESENT ILLNESS  Megan Howard is a 79 y.o. female. HPI  Follow up. Issues:  1. Hypertension, on Benicar/HCTZ. No chest pain or shortness of breath. Pressure is controlled. 2. Diabetes, using two Metformin a day and one Januvia a day. A1c was 6.8. Fasting sugars are 130. Postprandials are 140. Will continue current regimen. 3. Preventive care. Due for mammogram and bone density. Has had recurrent respiratory infections in the winter. She is using Singulair and Flonase. She is going to see an allergist for further evaluation. She is immunocompromised because of her autoimmune disease and this is certainly increasing her risk. Review of Systems   Constitutional: Negative for chills, fever and weight loss. Respiratory: Negative for cough, shortness of breath and wheezing. Cardiovascular: Negative for chest pain, palpitations, orthopnea, leg swelling and PND. Gastrointestinal: Negative for heartburn and nausea. Musculoskeletal: Negative for myalgias. Neurological: Negative for dizziness and headaches. Endo/Heme/Allergies: Positive for environmental allergies. Physical Exam   Constitutional: She is oriented to person, place, and time. She appears well-developed and well-nourished. HENT:   Head: Normocephalic and atraumatic. Neck: Normal range of motion. Neck supple. Carotid bruit is not present. No thyromegaly present. Cardiovascular: Normal rate, regular rhythm, S1 normal, S2 normal, normal heart sounds and intact distal pulses. No murmur heard. Pulmonary/Chest: Effort normal and breath sounds normal. No respiratory distress. She has no wheezes. She has no rales. Musculoskeletal: She exhibits no edema. Neurological: She is alert and oriented to person, place, and time. Psychiatric: She has a normal mood and affect. Her behavior is normal.   Nursing note and vitals reviewed. ASSESSMENT and PLAN  Diagnoses and all orders for this visit:    1.  Type 2 diabetes mellitus with hyperglycemia, without long-term current use of insulin (HCC)  -     metFORMIN ER (GLUCOPHAGE XR) 500 mg tablet; Take 2 Tabs by mouth daily (with dinner). -     SITagliptin (JANUVIA) 100 mg tablet; TAKE 1 TABLET BY MOUTH DAILY    2. Recurrent URI (upper respiratory infection)  -     REFERRAL TO ALLERGY  -     montelukast (SINGULAIR) 10 mg tablet; TAKE 1 TABLET BY MOUTH EVERY DAY    3. Essential hypertension with goal blood pressure less than 130/80  -     olmesartan-hydroCHLOROthiazide (BENICAR HCT) 40-25 mg per tablet; TAKE 1 TABLET BY MOUTH DAILY    4. Breast cancer screening  -     [facility] 3D DENISE W MAMMO BI SCREENING INCL CAD; Future    5. Postmenopausal  -     DEXA BONE DENSITY STUDY AXIAL;  Future

## 2018-06-06 NOTE — MR AVS SNAPSHOT
[facility] 
 
 
 [address] [place] Leitz [address] [facility] 
[phone] Patient: Debra Rodrigez MRN:  [id number] Visit Information Date & Time Provider Department Dept. Phone Encounter #  
 6/6/2018  9:00 AM Ishan Nair MD [facility] [address] [id number] Upcoming Health Maintenance Date Due DTaP/Tdap/Td series (1 - Tdap) 4/6/2007 Bone Densitometry (Dexa) Screening 12/20/2015 EYE EXAM RETINAL OR DILATED Q1 2/7/2018 BREAST CANCER SCRN MAMMOGRAM 11/1/2018* Influenza Age 5 to Adult 8/1/2018 FOOT EXAM Q1 8/14/2018 MEDICARE YEARLY EXAM 8/15/2018 HEMOGLOBIN A1C Q6M 11/29/2018 LIPID PANEL Q1 2/14/2019 MICROALBUMIN Q1 5/29/2019 Pneumococcal 65+ Low/Medium Risk (2 of 2 - PPSV23) 8/6/2019 GLAUCOMA SCREENING Q2Y 8/8/2019 COLONOSCOPY 11/6/2019 *Topic was postponed. The date shown is not the original due date. Allergies as of 6/6/2018  Review Complete On: 6/6/2018 By: Fayne Harada, LPN Severity Noted Reaction Type Reactions Latex  01/15/2009    Swelling SWELLING TONGUE, ORAL CAVITY Benadryl [Diphenhydramine Hcl] Medium 03/07/2011   Systemic Unknown (comments) Raises heart rate Atropine  01/15/2009    Swelling Eye swelling Chocolate [Cocoa]  03/27/2017    Rash Compazine [Prochlorperazine Edisylate]  01/15/2009    Other (comments) Muscle spasm, POSSIBLE SEIZURES Cymbalta [Duloxetine]  03/14/2017    Vertigo Diclofenac Sodium (Bulk)  11/13/2012    Rash Milk  03/27/2017    Rash Phenobarbital  01/15/2009    Other (comments)  
 tachy Current Immunizations  Reviewed on 8/14/2017 Name Date Influenza High Dose Vaccine PF 11/2/2017 12:00 AM, 10/6/2016 Pneumococcal Conjugate (PCV-13) 5/25/2016 Pneumococcal Polysaccharide (PPSV-23) 8/6/2014 Td 4/5/2007 Varicella Virus Vaccine Live 2/1/2011 ZZZ-RETIRED (DO NOT USE) Pneumococcal Vaccine (Unspecified Type) 2/10/2011 10:59 AM  
 Zoster Vaccine, Live 1/1/2011 Not reviewed this visit You Were Diagnosed With   
  
 Codes Comments Type 2 diabetes mellitus with hyperglycemia, without long-term current use of insulin (HCC)    -  Primary ICD-10-CM: E11.65 ICD-9-CM: 250.00, 790.29 Recurrent URI (upper respiratory infection)     ICD-10-CM: J06.9 ICD-9-CM: 465.9 Essential hypertension with goal blood pressure less than 130/80     ICD-10-CM: I10 
ICD-9-CM: 401.9 Vitals BP Pulse Temp Resp Height(growth percentile) Weight(growth percentile) 123/74 (BP 1 Location: Left arm, BP Patient Position: Sitting) 83 98.3 °F (36.8 °C) (Oral) 16 5' 2\" (1.575 m) 177 lb (80.3 kg) SpO2 BMI OB Status Smoking Status 98% 32.37 kg/m2 Hysterectomy Never Smoker Vitals History BMI and BSA Data Body Mass Index Body Surface Area  
 32.37 kg/m 2 1.87 m 2 Preferred Pharmacy Pharmacy Name Phone [facility] [facility], [address] [place] [address] AT [address] [place] [address] Due [phone] Your Updated Medication List  
  
   
This list is accurate as of 6/6/18  9:32 AM.  Always use your most recent med list.  
  
  
  
  
 albuterol 90 mcg/actuation inhaler Commonly known as:  PROVENTIL HFA, VENTOLIN HFA, PROAIR HFA Take 1 Puff by inhalation every six (6) hours as needed. amLODIPine 5 mg tablet Commonly known as:  Shwetha Mayra Take 1 Tab by mouth daily. atorvastatin 20 mg tablet Commonly known as:  LIPITOR  
TAKE 1 TABLET BY MOUTH EVERY EVENING Blood-Glucose Meter monitoring kit Commonly known as:  FREESTYLE LITE METER Use to test blood sugar once a day. Dx: E11.65  
  
 fluticasone 50 mcg/actuation nasal spray Commonly known as:  FLONASE  
2 sprays by Both Nostrils route daily. folic acid 1 mg tablet Commonly known as:  Google  
 Take 2 Tabs by mouth daily. glucose blood VI test strips strip Commonly known as:  FREESTYLE LITE STRIPS Use to test blood sugar once daily. Dx: E11.65 Lancets Misc Use to test blood sugar once daily. Dx: E11.65  
  
 melatonin 5 mg Cap capsule Take 5 mg by mouth nightly. * metFORMIN  mg tablet Commonly known as:  GLUCOPHAGE XR  
2 po every day with dinner * metFORMIN  mg tablet Commonly known as:  GLUCOPHAGE XR Take 2 Tabs by mouth daily (with dinner). methotrexate 2.5 mg tablet Commonly known as:  Clearance Both Take 6 Tabs by mouth Every Friday. montelukast 10 mg tablet Commonly known as:  SINGULAIR  
TAKE 1 TABLET BY MOUTH EVERY DAY  
  
 olmesartan-hydroCHLOROthiazide 40-25 mg per tablet Commonly known as:  BENICAR HCT  
TAKE 1 TABLET BY MOUTH DAILY  
  
 oxyCODONE IR 5 mg immediate release tablet Commonly known as:  Santo Bers Take 1-2 Tabs by mouth every four (4) hours as needed for Pain. Max Daily Amount: 60 mg.  
  
 prednisoLONE acetate 1 % ophthalmic suspension Commonly known as:  PRED FORTE Administer 1 Drop to both eyes as needed. raNITIdine 150 mg tablet Commonly known as:  ZANTAC Take 1 Tab by mouth two (2) times a day. SITagliptin 100 mg tablet Commonly known as:  Pavel Gregg TAKE 1 TABLET BY MOUTH DAILY  
  
 TYLENOL 325 mg tablet Generic drug:  acetaminophen Take  by mouth every four (4) hours as needed for Pain. * Notice: This list has 2 medication(s) that are the same as other medications prescribed for you. Read the directions carefully, and ask your doctor or other care provider to review them with you. Prescriptions Sent to Pharmacy Refills  
 metFORMIN ER (GLUCOPHAGE XR) 500 mg tablet 5 Sig: Take 2 Tabs by mouth daily (with dinner).   
 Class: Normal  
 Pharmacy: [facility] [id number] [facility] [address], [address] [place] [address] [address] [facility] Ph #: [phone] Route: Oral  
 SITagliptin (JANUVIA) 100 mg tablet 5 Sig: TAKE 1 TABLET BY MOUTH DAILY Class: Normal  
 [facility] [address] AT [facility] Ph #: [phone]  
 olmesartan-hydroCHLOROthiazide (BENICAR HCT) 40-25 mg per tablet 6 Sig: TAKE 1 TABLET BY MOUTH DAILY Class: Normal  
 [facility] [address] AT [facility] Ph #: [phone]  
 montelukast (SINGULAIR) 10 mg tablet 11 Sig: TAKE 1 TABLET BY MOUTH EVERY DAY Class: Normal  
 [facility] [address] [facility], [address] [facility] Ph #: [phone] We Performed the Following REFERRAL TO ALLERGY [REF5 Custom] Referral Information Referral ID Referred By Referred To  
  
 [id number] [facility] CRISTAL BARRERA [facility], Oral Angela MD   
   [facility] [address] [place] [address] Allergy and Asthma [address] Phone: [phone] Fax: [phone] Visits Status Start Date End Date 1 New Request 6/6/18 6/6/19 If your referral has a status of pending review or denied, additional information will be sent to support the outcome of this decision. Introducing [facility] HE[facility]! Dear Rosemary Chen: 
Thank you for requesting a [employer] account. Our records indicate that you already have an active [employer] account. You can access your account anytime at [email or web address] Did you know that you can access your hospital and ER discharge instructions at any time in [employer]? You can also review all of your test results from your hospital stay or ER visit. Additional Information If you have questions, please visit the Frequently Asked Questions section of the [employer] website at [email or web address]/. Remember, MyChart is NOT to be used for urgent needs. For medical emergencies, dial 911. Now available from your iPhone and Android! Please provide this summary of care documentation to your next provider. Your primary care clinician is listed as Chava Davis. If you have any questions after today's visit, please call [phone].

## 2018-06-15 ENCOUNTER — HOSPITAL ENCOUNTER (OUTPATIENT)
Dept: MAMMOGRAPHY | Age: 68
Discharge: HOME | End: 2018-06-15
Attending: INTERNAL MEDICINE
Payer: MEDICARE

## 2018-06-15 DIAGNOSIS — Z12.39 BREAST CANCER SCREENING: ICD-10-CM

## 2018-06-15 DIAGNOSIS — Z78.0 POSTMENOPAUSAL: ICD-10-CM

## 2018-06-15 PROCEDURE — 77080 DXA BONE DENSITY AXIAL: CPT

## 2018-06-15 PROCEDURE — 77063 BREAST TOMOSYNTHESIS BI: CPT

## 2018-06-20 DIAGNOSIS — R92.8 ABNORMAL MAMMOGRAM: Primary | ICD-10-CM

## 2018-06-27 ENCOUNTER — HOSPITAL ENCOUNTER (OUTPATIENT)
Dept: MAMMOGRAPHY | Age: 68
Discharge: HOME | End: 2018-06-27
Attending: INTERNAL MEDICINE
Payer: MEDICARE

## 2018-06-27 DIAGNOSIS — R92.8 ABNORMAL MAMMOGRAM: ICD-10-CM

## 2018-06-27 PROCEDURE — 77065 DX MAMMO INCL CAD UNI: CPT

## 2018-07-29 DIAGNOSIS — I10 ESSENTIAL HYPERTENSION WITH GOAL BLOOD PRESSURE LESS THAN 130/80: ICD-10-CM

## 2018-07-30 RX ORDER — OLMESARTAN MEDOXOMIL AND HYDROCHLOROTHIAZIDE 40/25 40; 25 MG/1; MG/1
TABLET ORAL
Qty: 30 TAB | Refills: 5 | Status: SHIPPED | OUTPATIENT
Start: 2018-07-30 | End: 2018-10-23 | Stop reason: SDUPTHER

## 2018-07-31 ENCOUNTER — PATIENT MESSAGE (OUTPATIENT)
Dept: INTERNAL MEDICINE CLINIC | Age: 68
End: 2018-07-31

## 2018-07-31 NOTE — TELEPHONE ENCOUNTER
From: Chelsea Viramontes  To: Marilyn Dietrich MD  Sent: 7/31/2018 11:56 AM EDT  Subject: Referral Request    Please send me the name of allergist you recommended on my last visit. I am sure I put it [facility] place to remind me to call and thats why I cant find it.  Thanks and sorry    Royal Osborne

## 2018-10-11 DIAGNOSIS — J06.9 RECURRENT URI (UPPER RESPIRATORY INFECTION): ICD-10-CM

## 2018-10-11 DIAGNOSIS — E11.65 TYPE 2 DIABETES MELLITUS WITH HYPERGLYCEMIA, WITHOUT LONG-TERM CURRENT USE OF INSULIN (HCC): ICD-10-CM

## 2018-10-12 RX ORDER — METFORMIN HYDROCHLORIDE 500 MG/1
TABLET, EXTENDED RELEASE ORAL
Qty: 180 TAB | Refills: 5 | Status: SHIPPED | OUTPATIENT
Start: 2018-10-12 | End: 2019-01-03 | Stop reason: ALTCHOICE

## 2018-10-12 RX ORDER — MONTELUKAST SODIUM 10 MG/1
TABLET ORAL
Qty: 90 TAB | Refills: 11 | Status: SHIPPED | OUTPATIENT
Start: 2018-10-12 | End: 2019-12-31

## 2018-10-23 DIAGNOSIS — I10 ESSENTIAL HYPERTENSION WITH GOAL BLOOD PRESSURE LESS THAN 130/80: ICD-10-CM

## 2018-10-23 RX ORDER — OLMESARTAN MEDOXOMIL AND HYDROCHLOROTHIAZIDE 40/25 40; 25 MG/1; MG/1
TABLET ORAL
Qty: 90 TAB | Refills: 5 | Status: SHIPPED | OUTPATIENT
Start: 2018-10-23 | End: 2019-02-25 | Stop reason: RX

## 2018-12-06 ENCOUNTER — OFFICE VISIT (OUTPATIENT)
Dept: INTERNAL MEDICINE CLINIC | Age: 68
End: 2018-12-06

## 2018-12-06 ENCOUNTER — HOSPITAL ENCOUNTER (OUTPATIENT)
Dept: LAB | Age: 68
Discharge: HOME | End: 2018-12-06
Payer: MEDICARE

## 2018-12-06 VITALS
TEMPERATURE: 98.3 F | BODY MASS INDEX: 32.2 KG/M2 | HEART RATE: 60 BPM | SYSTOLIC BLOOD PRESSURE: 141 MMHG | DIASTOLIC BLOOD PRESSURE: 77 MMHG | HEIGHT: 62 IN | WEIGHT: 175 LBS | OXYGEN SATURATION: 98 % | RESPIRATION RATE: 16 BRPM

## 2018-12-06 DIAGNOSIS — F41.9 ANXIETY AND DEPRESSION: Primary | ICD-10-CM

## 2018-12-06 DIAGNOSIS — F32.A ANXIETY AND DEPRESSION: Primary | ICD-10-CM

## 2018-12-06 DIAGNOSIS — E11.65 TYPE 2 DIABETES MELLITUS WITH HYPERGLYCEMIA, WITHOUT LONG-TERM CURRENT USE OF INSULIN (HCC): ICD-10-CM

## 2018-12-06 DIAGNOSIS — R19.7 DIARRHEA, UNSPECIFIED TYPE: ICD-10-CM

## 2018-12-06 PROCEDURE — 36415 COLL VENOUS BLD VENIPUNCTURE: CPT

## 2018-12-06 PROCEDURE — 80061 LIPID PANEL: CPT

## 2018-12-06 PROCEDURE — 83036 HEMOGLOBIN GLYCOSYLATED A1C: CPT

## 2018-12-06 PROCEDURE — 85025 COMPLETE CBC W/AUTO DIFF WBC: CPT

## 2018-12-06 PROCEDURE — 84443 ASSAY THYROID STIM HORMONE: CPT

## 2018-12-06 PROCEDURE — 80053 COMPREHEN METABOLIC PANEL: CPT

## 2018-12-06 RX ORDER — METFORMIN HYDROCHLORIDE 500 MG/1
TABLET, EXTENDED RELEASE ORAL
Qty: 90 TAB | Refills: 1
Start: 2018-12-06 | End: 2019-06-25 | Stop reason: SDUPTHER

## 2018-12-06 RX ORDER — SERTRALINE HYDROCHLORIDE 25 MG/1
25 TABLET, FILM COATED ORAL DAILY
Qty: 30 TAB | Refills: 2 | Status: SHIPPED | OUTPATIENT
Start: 2018-12-06 | End: 2019-01-03 | Stop reason: SDUPTHER

## 2018-12-06 NOTE — PROGRESS NOTES
HISTORY OF PRESENT ILLNESS  Melba Ariza is a 79 y.o. female. HPI  Seen at follow up, several concerns:  1. Loose stools, particularly in the morning, can occur three times, then better for the rest of the day. No bloody stools, no fevers. She is on two Metformin and will drop to one a day. 2. Trouble with anger, some anxiety, some sleep disruption, stress with her . Feels overwhelmed with chronic pain from arthritis and lack of control. Discussed options. Will start low dose Zoloft. 3. She did have a flu shot in October. 4. She remains on Lipitor. No myalgias and due for labs. Review of Systems   Constitutional: Negative for chills, fever and weight loss. Respiratory: Negative for cough, shortness of breath and wheezing. Cardiovascular: Negative for chest pain, palpitations, orthopnea, leg swelling and PND. Gastrointestinal: Positive for diarrhea. Negative for abdominal pain, blood in stool, heartburn and nausea. Musculoskeletal: Negative for myalgias. Neurological: Negative for dizziness and headaches. Psychiatric/Behavioral: Positive for depression. The patient is nervous/anxious and has insomnia. Physical Exam   Constitutional: She is oriented to person, place, and time. She appears well-developed and well-nourished. HENT:   Head: Normocephalic and atraumatic. Neck: Normal range of motion. Neck supple. Carotid bruit is not present. No thyromegaly present. Cardiovascular: Normal rate, regular rhythm, S1 normal, S2 normal, normal heart sounds and intact distal pulses. No murmur heard. Pulmonary/Chest: Effort normal and breath sounds normal. No respiratory distress. She has no wheezes. She has no rales. Musculoskeletal: She exhibits no edema. Neurological: She is alert and oriented to person, place, and time. Psychiatric: She has a normal mood and affect. Her behavior is normal.   Nursing note and vitals reviewed.       ASSESSMENT and PLAN  Diagnoses and all orders for this visit:    1. Anxiety and depression  -     sertraline (ZOLOFT) 25 mg tablet; Take 1 Tab by mouth daily. 2. Type 2 diabetes mellitus with hyperglycemia, without long-term current use of insulin (HCC)  -     metFORMIN ER (GLUCOPHAGE XR) 500 mg tablet; 1 po every day with dinner  -     METABOLIC PANEL, COMPREHENSIVE  -     LIPID PANEL  -     MICROALBUMIN, UR, RAND W/ MICROALB/CREAT RATIO  -     HEMOGLOBIN A1C WITH EAG    3.  Diarrhea, unspecified type  -     CBC WITH AUTOMATED DIFF  -     TSH 3RD GENERATION    appt in 1mo on dec metformin and with zoloft

## 2018-12-07 LAB
ALBUMIN SERPL-MCNC: 4.4 G/DL (ref 3.6–4.8)
ALBUMIN/GLOB SERPL: 2.1 {RATIO} (ref 1.2–2.2)
ALP SERPL-CCNC: 58 IU/L (ref 39–117)
ALT SERPL-CCNC: 28 IU/L (ref 0–32)
AST SERPL-CCNC: 19 IU/L (ref 0–40)
BASOPHILS # BLD AUTO: 0 X10E3/UL (ref 0–0.2)
BASOPHILS NFR BLD AUTO: 1 %
BILIRUB SERPL-MCNC: 0.5 MG/DL (ref 0–1.2)
BUN SERPL-MCNC: 16 MG/DL (ref 8–27)
BUN/CREAT SERPL: 17 (ref 12–28)
CALCIUM SERPL-MCNC: 9.5 MG/DL (ref 8.7–10.3)
CHLORIDE SERPL-SCNC: 104 MMOL/L (ref 96–106)
CHOLEST SERPL-MCNC: 135 MG/DL (ref 100–199)
CO2 SERPL-SCNC: 26 MMOL/L (ref 20–29)
CREAT SERPL-MCNC: 0.92 MG/DL (ref 0.57–1)
EOSINOPHIL # BLD AUTO: 0.3 X10E3/UL (ref 0–0.4)
EOSINOPHIL NFR BLD AUTO: 4 %
ERYTHROCYTE [DISTWIDTH] IN BLOOD BY AUTOMATED COUNT: 15 % (ref 12.3–15.4)
EST. AVERAGE GLUCOSE BLD GHB EST-MCNC: 151 MG/DL
GLOBULIN SER CALC-MCNC: 2.1 G/DL (ref 1.5–4.5)
GLUCOSE SERPL-MCNC: 162 MG/DL (ref 65–99)
HBA1C MFR BLD: 6.9 % (ref 4.8–5.6)
HCT VFR BLD AUTO: 39.3 % (ref 34–46.6)
HDLC SERPL-MCNC: 42 MG/DL
HGB BLD-MCNC: 13.2 G/DL (ref 11.1–15.9)
IMM GRANULOCYTES # BLD: 0 X10E3/UL (ref 0–0.1)
IMM GRANULOCYTES NFR BLD: 0 %
INTERPRETATION, 910389: NORMAL
LDLC SERPL CALC-MCNC: 62 MG/DL (ref 0–99)
LYMPHOCYTES # BLD AUTO: 2.4 X10E3/UL (ref 0.7–3.1)
LYMPHOCYTES NFR BLD AUTO: 41 %
Lab: NORMAL
MCH RBC QN AUTO: 32.1 PG (ref 26.6–33)
MCHC RBC AUTO-ENTMCNC: 33.6 G/DL (ref 31.5–35.7)
MCV RBC AUTO: 96 FL (ref 79–97)
MONOCYTES # BLD AUTO: 0.8 X10E3/UL (ref 0.1–0.9)
MONOCYTES NFR BLD AUTO: 13 %
NEUTROPHILS # BLD AUTO: 2.5 X10E3/UL (ref 1.4–7)
NEUTROPHILS NFR BLD AUTO: 41 %
PLATELET # BLD AUTO: 237 X10E3/UL (ref 150–379)
POTASSIUM SERPL-SCNC: 4 MMOL/L (ref 3.5–5.2)
PROT SERPL-MCNC: 6.5 G/DL (ref 6–8.5)
RBC # BLD AUTO: 4.11 X10E6/UL (ref 3.77–5.28)
SODIUM SERPL-SCNC: 144 MMOL/L (ref 134–144)
TRIGL SERPL-MCNC: 155 MG/DL (ref 0–149)
TSH SERPL DL<=0.005 MIU/L-ACNC: 1.24 UIU/ML (ref 0.45–4.5)
VLDLC SERPL CALC-MCNC: 31 MG/DL (ref 5–40)
WBC # BLD AUTO: 6 X10E3/UL (ref 3.4–10.8)

## 2019-01-03 ENCOUNTER — OFFICE VISIT (OUTPATIENT)
Dept: INTERNAL MEDICINE CLINIC | Age: 69
End: 2019-01-03

## 2019-01-03 VITALS
BODY MASS INDEX: 32.2 KG/M2 | SYSTOLIC BLOOD PRESSURE: 126 MMHG | OXYGEN SATURATION: 98 % | HEART RATE: 74 BPM | TEMPERATURE: 99 F | DIASTOLIC BLOOD PRESSURE: 80 MMHG | RESPIRATION RATE: 16 BRPM | HEIGHT: 62 IN | WEIGHT: 175 LBS

## 2019-01-03 DIAGNOSIS — K59.1 FUNCTIONAL DIARRHEA: ICD-10-CM

## 2019-01-03 DIAGNOSIS — F41.9 ANXIETY AND DEPRESSION: ICD-10-CM

## 2019-01-03 DIAGNOSIS — R68.83 CHILLS: ICD-10-CM

## 2019-01-03 DIAGNOSIS — F32.A ANXIETY AND DEPRESSION: ICD-10-CM

## 2019-01-03 DIAGNOSIS — R05.9 COUGH: Primary | ICD-10-CM

## 2019-01-03 DIAGNOSIS — J02.9 SORE THROAT: ICD-10-CM

## 2019-01-03 LAB
QUICKVUE INFLUENZA TEST: NEGATIVE
S PYO AG THROAT QL: POSITIVE
VALID INTERNAL CONTROL?: YES
VALID INTERNAL CONTROL?: YES

## 2019-01-03 RX ORDER — SERTRALINE HYDROCHLORIDE 25 MG/1
25 TABLET, FILM COATED ORAL DAILY
Qty: 30 TAB | Refills: 5 | Status: SHIPPED | OUTPATIENT
Start: 2019-01-03 | End: 2019-03-21 | Stop reason: SDUPTHER

## 2019-01-03 RX ORDER — AMOXICILLIN 500 MG/1
500 CAPSULE ORAL 3 TIMES DAILY
Qty: 30 CAP | Refills: 0 | Status: SHIPPED | OUTPATIENT
Start: 2019-01-03 | End: 2019-03-21 | Stop reason: ALTCHOICE

## 2019-01-03 RX ORDER — FLUTICASONE PROPIONATE 50 MCG
2 SPRAY, SUSPENSION (ML) NASAL DAILY
Qty: 1 BOTTLE | Refills: 3 | Status: SHIPPED | OUTPATIENT
Start: 2019-01-03 | End: 2019-07-12

## 2019-01-03 NOTE — PROGRESS NOTES
HISTORY OF PRESENT ILLNESS  Lori Parrish is a 76 y.o. female. [facility]  Keshia Raphael is seen for several issues:  1. A month ago she was having diarrhea. We dropped her Metformin to one tab daily and the diarrhea resolved. 2. Anxiety/depression. We started Zoloft 25. This has helped. She is able to be more calm with her  when he gets angry and she feels she is more objective. 3. Cough and congestion, started 12/20. Actually feels this is improving, however she visited her grandchildren over the holidays and recently has a sore throat. Strep swab comes back positive. Review of Systems   Constitutional: Positive for malaise/fatigue. Negative for chills, diaphoresis and fever. HENT: Negative for congestion, ear discharge, ear pain, nosebleeds and sore throat. Eyes: Negative for pain and discharge. Respiratory: Positive for cough. Negative for hemoptysis, sputum production, shortness of breath and wheezing. Cardiovascular: Negative for chest pain. Gastrointestinal: Negative for abdominal pain and diarrhea. Neurological: Negative for headaches. Psychiatric/Behavioral: Negative for depression. The patient is not nervous/anxious. Physical Exam   Constitutional: She appears well-developed and well-nourished. HENT:   Head: Normocephalic. Right Ear: Tympanic membrane, external ear and ear canal normal.   Left Ear: Tympanic membrane, external ear and ear canal normal.   Nose: Nose normal.   Mouth/Throat: Oropharynx is clear and moist and mucous membranes are normal. No oropharyngeal exudate. Eyes: Conjunctivae are normal. Pupils are equal, round, and reactive to light. Right eye exhibits no discharge. Left eye exhibits no discharge. Neck: Normal range of motion. Neck supple. Cardiovascular: Normal rate, regular rhythm and normal heart sounds. Pulmonary/Chest: Effort normal and breath sounds normal. No respiratory distress. She has no wheezes. She has no rales.    Lymphadenopathy: She has no cervical adenopathy. Nursing note and vitals reviewed. ASSESSMENT and PLAN  Diagnoses and all orders for this visit:    1. Cough  -     AMB POC RAPID STREP A  -     AMB POC RAPID INFLUENZA TEST  -     fluticasone (FLONASE) 50 mcg/actuation nasal spray; 2 Sprays by Both Nostrils route daily. 2. Chills  -     AMB POC RAPID STREP A  -     AMB POC RAPID INFLUENZA TEST    3. Sore throat-strep positive treat and discussed contagious nature  -     AMB POC RAPID STREP A  -     AMB POC RAPID INFLUENZA TEST  -     amoxicillin (AMOXIL) 500 mg capsule; Take 1 Cap by mouth three (3) times daily. 4. Functional diarrhea    5. Anxiety and depression  -     sertraline (ZOLOFT) 25 mg tablet; Take 1 Tab by mouth daily.

## 2019-02-04 ENCOUNTER — TELEPHONE (OUTPATIENT)
Dept: INTERNAL MEDICINE CLINIC | Age: 69
End: 2019-02-04

## 2019-02-04 NOTE — TELEPHONE ENCOUNTER
Notified patient her recent labs have been faxed to [phone] as requested. Patient states her [facility] pharmacy advised hey have not been able to get the Olmesartan medication the patient is on & advised patient the  Is discontinuing the make of this medication. Advised patient [facility] may only deal with 1  & may need to check with the surrounding pharmacies to verify. Patient voiced understanding.

## 2019-02-22 ENCOUNTER — TELEPHONE (OUTPATIENT)
Dept: INTERNAL MEDICINE CLINIC | Age: 69
End: 2019-02-22

## 2019-02-22 NOTE — TELEPHONE ENCOUNTER
----- Message from Faraz Gu sent at 2/22/2019  2:54 PM EST -----  Regarding: Dr. Avi Carver  Pt is calling regarding Rx Olmesartan Medox 40-25mg has been discontinued with 3 pharmacy's. Best contact is [phone].

## 2019-02-25 RX ORDER — HYDROCHLOROTHIAZIDE 25 MG/1
25 TABLET ORAL DAILY
Qty: 90 TAB | Refills: 1 | Status: SHIPPED | OUTPATIENT
Start: 2019-02-25 | End: 2019-05-21 | Stop reason: ALTCHOICE

## 2019-02-25 RX ORDER — OLMESARTAN MEDOXOMIL 40 MG/1
40 TABLET ORAL DAILY
Qty: 90 TAB | Refills: 0 | Status: SHIPPED | OUTPATIENT
Start: 2019-02-25 | End: 2019-03-21 | Stop reason: ALTCHOICE

## 2019-02-25 NOTE — TELEPHONE ENCOUNTER
Per patient her Benicar-hct 40-25 is currently on back order. She has checked 3 different surrounding pharmacies including the [facility] & she has been told the same thing. Please advise.

## 2019-02-25 NOTE — TELEPHONE ENCOUNTER
Notified patient 2 separate prescriptions for benicar & hctz has been sent to the [facility] since the combo pill is not available. Patient voiced understanding.

## 2019-03-01 DIAGNOSIS — F41.9 ANXIETY AND DEPRESSION: ICD-10-CM

## 2019-03-01 DIAGNOSIS — F32.A ANXIETY AND DEPRESSION: ICD-10-CM

## 2019-03-01 RX ORDER — SERTRALINE HYDROCHLORIDE 25 MG/1
TABLET, FILM COATED ORAL
Qty: 30 TAB | Refills: 0 | Status: SHIPPED | OUTPATIENT
Start: 2019-03-01 | End: 2019-03-21 | Stop reason: DRUGHIGH

## 2019-03-21 ENCOUNTER — HOSPITAL ENCOUNTER (OUTPATIENT)
Dept: LAB | Age: 69
Discharge: HOME | End: 2019-03-21
Payer: MEDICARE

## 2019-03-21 ENCOUNTER — OFFICE VISIT (OUTPATIENT)
Dept: INTERNAL MEDICINE CLINIC | Age: 69
End: 2019-03-21

## 2019-03-21 VITALS
SYSTOLIC BLOOD PRESSURE: 147 MMHG | BODY MASS INDEX: 32.39 KG/M2 | HEIGHT: 62 IN | RESPIRATION RATE: 16 BRPM | DIASTOLIC BLOOD PRESSURE: 82 MMHG | OXYGEN SATURATION: 97 % | WEIGHT: 176 LBS | HEART RATE: 72 BPM | TEMPERATURE: 98.4 F

## 2019-03-21 DIAGNOSIS — I10 ESSENTIAL HYPERTENSION WITH GOAL BLOOD PRESSURE LESS THAN 130/80: ICD-10-CM

## 2019-03-21 DIAGNOSIS — F51.02 ADJUSTMENT INSOMNIA: ICD-10-CM

## 2019-03-21 DIAGNOSIS — E11.65 TYPE 2 DIABETES MELLITUS WITH HYPERGLYCEMIA, WITHOUT LONG-TERM CURRENT USE OF INSULIN (HCC): Primary | ICD-10-CM

## 2019-03-21 DIAGNOSIS — M45.9 ANKYLOSING SPONDYLITIS, UNSPECIFIED SITE OF SPINE (HCC): ICD-10-CM

## 2019-03-21 PROCEDURE — 83036 HEMOGLOBIN GLYCOSYLATED A1C: CPT

## 2019-03-21 PROCEDURE — 85651 RBC SED RATE NONAUTOMATED: CPT

## 2019-03-21 PROCEDURE — 36415 COLL VENOUS BLD VENIPUNCTURE: CPT

## 2019-03-21 PROCEDURE — 86141 C-REACTIVE PROTEIN HS: CPT

## 2019-03-21 PROCEDURE — 80053 COMPREHEN METABOLIC PANEL: CPT

## 2019-03-21 PROCEDURE — 85025 COMPLETE CBC W/AUTO DIFF WBC: CPT

## 2019-03-21 RX ORDER — SERTRALINE HYDROCHLORIDE 50 MG/1
50 TABLET, FILM COATED ORAL DAILY
Qty: 90 TAB | Refills: 2 | Status: SHIPPED | OUTPATIENT
Start: 2019-03-21 | End: 2019-09-05 | Stop reason: SDUPTHER

## 2019-03-21 RX ORDER — VALSARTAN 320 MG/1
320 TABLET ORAL DAILY
Qty: 90 TAB | Refills: 2 | Status: SHIPPED | OUTPATIENT
Start: 2019-03-21 | End: 2019-05-21 | Stop reason: ALTCHOICE

## 2019-03-21 NOTE — PROGRESS NOTES
HISTORY OF PRESENT ILLNESS  Rajni Lopez is a 76 y.o. female. HPI  Follow up, med check. Her cough has resolved, but she feels exhausted. Believes it is because of disrupted sleep with more stress at home. Sister in law was in a car accident. She is not sleeping more than a few hours at a time. Diabetes, on one Metformin a day and Januvia 100 a day. Has not been checking blood sugars. Expect sugars will be up. Ankylosing spondylitis. Recently had a steroid injection in shoulder. Rheumatology is requesting CRP, sed rate, CBC and chemistries, which we will draw. Occasional loose stools, perhaps every few weeks. No bloody stools. Review of Systems   Constitutional: Positive for malaise/fatigue. Negative for chills, fever and weight loss. Respiratory: Negative for cough, shortness of breath and wheezing. Cardiovascular: Negative for chest pain, palpitations, orthopnea, leg swelling and PND. Gastrointestinal: Positive for diarrhea. Negative for abdominal pain, heartburn, nausea and vomiting. Musculoskeletal: Positive for joint pain. Negative for myalgias. Neurological: Negative for dizziness and headaches. Physical Exam   Constitutional: She is oriented to person, place, and time. She appears well-developed and well-nourished. HENT:   Head: Normocephalic and atraumatic. Right Ear: Tympanic membrane, external ear and ear canal normal.   Left Ear: Tympanic membrane, external ear and ear canal normal.   Nose: Nose normal.   Mouth/Throat: Oropharynx is clear and moist and mucous membranes are normal. No oropharyngeal exudate. Eyes: Pupils are equal, round, and reactive to light. Conjunctivae are normal. Right eye exhibits no discharge. Left eye exhibits no discharge. Neck: Normal range of motion. Neck supple. Carotid bruit is not present. No thyromegaly present. Cardiovascular: Normal rate, regular rhythm, S1 normal, S2 normal, normal heart sounds and intact distal pulses. Frequent extrasystoles are present. No murmur heard. Pulmonary/Chest: Effort normal and breath sounds normal. No respiratory distress. She has no wheezes. She has no rales. Musculoskeletal: She exhibits no edema. Lymphadenopathy:     She has no cervical adenopathy. Neurological: She is alert and oriented to person, place, and time. Psychiatric: She has a normal mood and affect. Her behavior is normal.   Nursing note and vitals reviewed. ASSESSMENT and PLAN  Diagnoses and all orders for this visit:    1. Type 2 diabetes mellitus with hyperglycemia, without long-term current use of insulin ([facility])  -     METABOLIC PANEL, COMPREHENSIVE  -     HEMOGLOBIN A1C WITH EAG    2. Essential hypertension with goal blood pressure less than 130/80  -     valsartan (DIOVAN) 320 mg tablet; Take 1 Tab by mouth daily. 3. Adjustment insomnia  -     sertraline (ZOLOFT) 50 mg tablet; Take 1 Tab by mouth daily.     4. Ankylosing spondylitis, unspecified site of spine (HCC)  -     CBC WITH AUTOMATED DIFF  -     SED RATE (ESR)  -     CRP, HIGH SENSITIVITY      the following changes in treatment are made: inc zoloft dose and change benicar to diovan  appt in 3mo

## 2019-03-22 ENCOUNTER — HOSPITAL ENCOUNTER (OUTPATIENT)
Dept: MAMMOGRAPHY | Age: 69
Discharge: HOME | End: 2019-03-22
Attending: INTERNAL MEDICINE
Payer: MEDICARE

## 2019-03-22 DIAGNOSIS — R92.8 ABNORMAL MAMMOGRAM: ICD-10-CM

## 2019-03-22 LAB
ALBUMIN SERPL-MCNC: 4.2 G/DL (ref 3.6–4.8)
ALBUMIN/GLOB SERPL: 1.5 {RATIO} (ref 1.2–2.2)
ALP SERPL-CCNC: 61 IU/L (ref 39–117)
ALT SERPL-CCNC: 22 IU/L (ref 0–32)
AST SERPL-CCNC: 13 IU/L (ref 0–40)
BASOPHILS # BLD AUTO: 0 X10E3/UL (ref 0–0.2)
BASOPHILS NFR BLD AUTO: 0 %
BILIRUB SERPL-MCNC: 0.6 MG/DL (ref 0–1.2)
BUN SERPL-MCNC: 21 MG/DL (ref 8–27)
BUN/CREAT SERPL: 21 (ref 12–28)
CALCIUM SERPL-MCNC: 9.4 MG/DL (ref 8.7–10.3)
CHLORIDE SERPL-SCNC: 98 MMOL/L (ref 96–106)
CO2 SERPL-SCNC: 23 MMOL/L (ref 20–29)
CREAT SERPL-MCNC: 0.98 MG/DL (ref 0.57–1)
CRP SERPL HS-MCNC: 1.53 MG/L (ref 0–3)
EOSINOPHIL # BLD AUTO: 0.1 X10E3/UL (ref 0–0.4)
EOSINOPHIL NFR BLD AUTO: 1 %
ERYTHROCYTE [DISTWIDTH] IN BLOOD BY AUTOMATED COUNT: 14.3 % (ref 12.3–15.4)
ERYTHROCYTE [SEDIMENTATION RATE] IN BLOOD BY WESTERGREN METHOD: 3 MM/HR (ref 0–40)
EST. AVERAGE GLUCOSE BLD GHB EST-MCNC: 171 MG/DL
GLOBULIN SER CALC-MCNC: 2.8 G/DL (ref 1.5–4.5)
GLUCOSE SERPL-MCNC: 166 MG/DL (ref 65–99)
HBA1C MFR BLD: 7.6 % (ref 4.8–5.6)
HCT VFR BLD AUTO: 43.3 % (ref 34–46.6)
HGB BLD-MCNC: 14.6 G/DL (ref 11.1–15.9)
IMM GRANULOCYTES # BLD AUTO: 0.1 X10E3/UL (ref 0–0.1)
IMM GRANULOCYTES NFR BLD AUTO: 1 %
INTERPRETATION: NORMAL
LYMPHOCYTES # BLD AUTO: 5.9 X10E3/UL (ref 0.7–3.1)
LYMPHOCYTES NFR BLD AUTO: 54 %
Lab: NORMAL
MCH RBC QN AUTO: 31.7 PG (ref 26.6–33)
MCHC RBC AUTO-ENTMCNC: 33.7 G/DL (ref 31.5–35.7)
MCV RBC AUTO: 94 FL (ref 79–97)
MONOCYTES # BLD AUTO: 1.1 X10E3/UL (ref 0.1–0.9)
MONOCYTES NFR BLD AUTO: 10 %
NEUTROPHILS # BLD AUTO: 3.8 X10E3/UL (ref 1.4–7)
NEUTROPHILS NFR BLD AUTO: 34 %
PLATELET # BLD AUTO: 274 X10E3/UL (ref 150–379)
POTASSIUM SERPL-SCNC: 3.6 MMOL/L (ref 3.5–5.2)
PROT SERPL-MCNC: 7 G/DL (ref 6–8.5)
RBC # BLD AUTO: 4.6 X10E6/UL (ref 3.77–5.28)
SODIUM SERPL-SCNC: 138 MMOL/L (ref 134–144)
WBC # BLD AUTO: 11 X10E3/UL (ref 3.4–10.8)

## 2019-03-22 PROCEDURE — 77065 DX MAMMO INCL CAD UNI: CPT

## 2019-03-22 NOTE — PROGRESS NOTES
Can you send a copy of labs to dr Margo chang her rheumatologist? Also let her know that her avg glucose is up from our last check but I want to see what she can do with lifestyle and not change the diabetes meds yet-I am hoping that the other med changes we made will help her get more active which should help-francisco tin 3mo as we planned

## 2019-03-22 NOTE — PROGRESS NOTES
Detailed v/m left on patient's mobile number notifying of a1c results. Advised per PCP no med changes needed at this time, just need to make some lifestyle changes (diet & exercises) & appt in 3 months. Copy of labs sent to  as well.

## 2019-04-14 RX ORDER — ATORVASTATIN CALCIUM 20 MG/1
TABLET, FILM COATED ORAL
Qty: 30 TAB | Refills: 0 | Status: SHIPPED | OUTPATIENT
Start: 2019-04-14 | End: 2019-04-14 | Stop reason: SDUPTHER

## 2019-04-15 RX ORDER — ATORVASTATIN CALCIUM 20 MG/1
TABLET, FILM COATED ORAL
Qty: 90 TAB | Refills: 0 | Status: SHIPPED | OUTPATIENT
Start: 2019-04-15 | End: 2019-07-04 | Stop reason: SDUPTHER

## 2019-04-16 DIAGNOSIS — E11.65 TYPE 2 DIABETES MELLITUS WITH HYPERGLYCEMIA, WITHOUT LONG-TERM CURRENT USE OF INSULIN (HCC): ICD-10-CM

## 2019-05-05 DIAGNOSIS — E11.65 TYPE 2 DIABETES MELLITUS WITH HYPERGLYCEMIA, WITHOUT LONG-TERM CURRENT USE OF INSULIN (HCC): ICD-10-CM

## 2019-05-06 ENCOUNTER — TELEPHONE (OUTPATIENT)
Dept: INTERNAL MEDICINE CLINIC | Age: 69
End: 2019-05-06

## 2019-05-06 NOTE — TELEPHONE ENCOUNTER
Patient states what she thought were seasonal allergies turned into a full blown upper respiratory infection. She reports low grade fevers, sinus congestion, cough & wheezing. Her temp today was 97. Symptoms started last Wed or Thur. Advised patient based off reported symptoms does not warrant an ER visit but it would not hurt to have dispatch health come out & listen to her lungs & check her oxygen saturations. Patient states she feels she can wait for her appt tomorrow as scheduled but if starts to worsen she will contact dispatch health.

## 2019-05-06 NOTE — TELEPHONE ENCOUNTER
----- Message from Kalpana Salazar sent at 5/6/2019 12:29 PM EDT -----  Regarding: Cas Castillo - MD/ telephone  Pt has an appt 5/7 but would want her Dr to know that she is having trouble breathing and has an off and on fever. Pts contact [phone].

## 2019-05-07 ENCOUNTER — OFFICE VISIT (OUTPATIENT)
Dept: INTERNAL MEDICINE CLINIC | Age: 69
End: 2019-05-07

## 2019-05-07 VITALS
BODY MASS INDEX: 32.68 KG/M2 | WEIGHT: 177.6 LBS | HEART RATE: 63 BPM | OXYGEN SATURATION: 95 % | HEIGHT: 62 IN | DIASTOLIC BLOOD PRESSURE: 84 MMHG | TEMPERATURE: 99.1 F | SYSTOLIC BLOOD PRESSURE: 150 MMHG | RESPIRATION RATE: 22 BRPM

## 2019-05-07 DIAGNOSIS — J45.21 MILD INTERMITTENT REACTIVE AIRWAY DISEASE WITH ACUTE EXACERBATION: ICD-10-CM

## 2019-05-07 DIAGNOSIS — M45.9 ANKYLOSING SPONDYLITIS, UNSPECIFIED SITE OF SPINE (HCC): Primary | ICD-10-CM

## 2019-05-07 DIAGNOSIS — E11.65 TYPE 2 DIABETES MELLITUS WITH HYPERGLYCEMIA, WITHOUT LONG-TERM CURRENT USE OF INSULIN (HCC): ICD-10-CM

## 2019-05-07 DIAGNOSIS — J01.00 ACUTE NON-RECURRENT MAXILLARY SINUSITIS: ICD-10-CM

## 2019-05-07 DIAGNOSIS — I10 ESSENTIAL HYPERTENSION, BENIGN: ICD-10-CM

## 2019-05-07 RX ORDER — AMOXICILLIN AND CLAVULANATE POTASSIUM 875; 125 MG/1; MG/1
1 TABLET, FILM COATED ORAL EVERY 12 HOURS
Qty: 20 TAB | Refills: 0 | Status: SHIPPED | OUTPATIENT
Start: 2019-05-07 | End: 2019-05-21

## 2019-05-07 RX ORDER — PREDNISONE 20 MG/1
TABLET ORAL
Qty: 17 TAB | Refills: 0 | Status: SHIPPED | OUTPATIENT
Start: 2019-05-07 | End: 2019-06-25 | Stop reason: ALTCHOICE

## 2019-05-07 RX ORDER — CODEINE PHOSPHATE AND GUAIFENESIN 10; 100 MG/5ML; MG/5ML
5 SOLUTION ORAL
Qty: 120 ML | Refills: 0 | Status: SHIPPED | OUTPATIENT
Start: 2019-05-07 | End: 2019-05-10

## 2019-05-07 NOTE — PROGRESS NOTES
HISTORY OF PRESENT ILLNESS Shaheed Arnold is a 76 y.o. female. HPI Sinusitis/RAD: Pt initially experienced sinus congestion. She reports that congestion cleared up last week but that sore throat, intermittent fever, N/V, SOB, and cough developed. She experienced fever of 100.3 degrees on 5/05/19. She is usually sick for majority of winter. Ankylosing Spondylitis: Stable, and well-managed with current meds. Diabetic Review of Systems - further diabetic ROS: no polyuria or polydipsia, no chest pain, dyspnea or TIA's, no numbness, tingling or pain in extremities. Other symptoms and concerns: Pt report stable sugar levels at home. Hypertension ROS: taking medications as instructed, no medication side effects noted, no TIA's, no chest pain on exertion, no dyspnea on exertion, no swelling of ankles. New concerns:  Patient's BP in office today is 150/84. Review of Systems Constitutional: Positive for fever. HENT: Positive for congestion and sore throat. Respiratory: Positive for cough and shortness of breath. Gastrointestinal: Positive for nausea and vomiting. All other systems reviewed and are negative. Physical Exam  
Constitutional: She is oriented to person, place, and time. She appears well-developed and well-nourished. HENT:  
Head: Normocephalic and atraumatic. Right Ear: External ear normal.  
Left Ear: External ear normal.  
Nose: Nose normal.  
Mouth/Throat: Oropharynx is clear and moist.  
Fluid in both ears (left ear moreso than right ear). Eyes: Conjunctivae and EOM are normal.  
Neck: Normal range of motion. Neck supple. Carotid bruit is not present. No thyroid mass and no thyromegaly present. Cardiovascular: Normal rate, regular rhythm, S1 normal, S2 normal, normal heart sounds and intact distal pulses. Pulmonary/Chest: Effort normal. She has rhonchi. Abdominal: Soft.  Normal appearance and bowel sounds are normal. There is no hepatosplenomegaly. There is no tenderness. Musculoskeletal: Normal range of motion. Ambulates with quad cane today in clinic. Neurological: She is alert and oriented to person, place, and time. She has normal strength. No cranial nerve deficit or sensory deficit. Coordination normal.  
Skin: Skin is warm, dry and intact. No abrasion and no rash noted. Psychiatric: She has a normal mood and affect. Her behavior is normal. Judgment and thought content normal.  
Nursing note and vitals reviewed. ASSESSMENT and PLAN Diagnoses and all orders for this visit: 
 
1. Ankylosing spondylitis, unspecified site of spine ([facility] [address] Stable, and well-managed. No change in medications. 2. Type 2 diabetes mellitus with hyperglycemia, without long-term current use of insulin [facility] Sugars stable. Continue to follow diabetic diet and monitor sugars. 3. Essential hypertension, benign BP is at goal. I do not recommend any change in medications. 4. Acute non-recurrent maxillary sinusitis Prescribed Augmentin, Robitussin, and Prednisone. Will monitor for any changes or improvements. -     amoxicillin-clavulanate (AUGMENTIN) 875-125 mg per tablet; Take 1 Tab by mouth every twelve (12) hours. -     predniSONE (DELTASONE) 20 mg tablet; 3 every day for 3 days, 2 every day for 2 days, 1 every day for 2 days, 1/2 every day for 2 days 5. Mild intermittent reactive airway disease with acute exacerbation Prescribed Augmentin, Robitussin, and Prednisone. Will monitor for any changes or improvements. -     predniSONE (DELTASONE) 20 mg tablet; 3 every day for 3 days, 2 every day for 2 days, 1 every day for 2 days, 1/2 every day for 2 days 
-     guaiFENesin-codeine (ROBITUSSIN AC) 100-10 mg/5 mL solution; Take 5 mL by mouth three (3) times daily as needed for Cough for up to 3 days. Max Daily Amount: 15 mL. Lab results and schedule of future lab studies reviewed with patient. Reviewed diet, exercise and weight control. Written by Ana Maria Treadwell, as dictated by Radames Boateng MD.  
 
Current diagnosis and concerns discussed with pt at length. Understands risks and benefits or current treatment plan and medications and accepts the treatment and medication with any possible risks. Pt asks appropriate questions which were answered. Pt instructed to call with any concerns or problems. This note will not be viewable in [address]

## 2019-05-14 ENCOUNTER — OFFICE VISIT (OUTPATIENT)
Dept: INTERNAL MEDICINE CLINIC | Age: 69
End: 2019-05-14

## 2019-05-14 ENCOUNTER — HOSPITAL ENCOUNTER (OUTPATIENT)
Dept: GENERAL RADIOLOGY | Age: 69
Discharge: HOME | End: 2019-05-14
Attending: INTERNAL MEDICINE
Payer: MEDICARE

## 2019-05-14 VITALS
TEMPERATURE: 98.4 F | RESPIRATION RATE: 18 BRPM | SYSTOLIC BLOOD PRESSURE: 167 MMHG | OXYGEN SATURATION: 98 % | DIASTOLIC BLOOD PRESSURE: 80 MMHG | WEIGHT: 174.2 LBS | HEART RATE: 68 BPM | BODY MASS INDEX: 32.06 KG/M2 | HEIGHT: 62 IN

## 2019-05-14 DIAGNOSIS — E11.65 TYPE 2 DIABETES MELLITUS WITH HYPERGLYCEMIA, WITHOUT LONG-TERM CURRENT USE OF INSULIN (HCC): ICD-10-CM

## 2019-05-14 DIAGNOSIS — J20.8 ACUTE BRONCHITIS DUE TO OTHER SPECIFIED ORGANISMS: ICD-10-CM

## 2019-05-14 DIAGNOSIS — M45.9 ANKYLOSING SPONDYLITIS, UNSPECIFIED SITE OF SPINE (HCC): ICD-10-CM

## 2019-05-14 DIAGNOSIS — J20.8 ACUTE BRONCHITIS DUE TO OTHER SPECIFIED ORGANISMS: Primary | ICD-10-CM

## 2019-05-14 DIAGNOSIS — I10 ESSENTIAL HYPERTENSION: ICD-10-CM

## 2019-05-14 PROCEDURE — 71046 X-RAY EXAM CHEST 2 VIEWS: CPT

## 2019-05-14 RX ORDER — LEVOFLOXACIN 500 MG/1
500 TABLET, FILM COATED ORAL DAILY
Qty: 10 TAB | Refills: 0 | Status: SHIPPED | OUTPATIENT
Start: 2019-05-14 | End: 2019-06-25 | Stop reason: ALTCHOICE

## 2019-05-14 NOTE — PROGRESS NOTES
HISTORY OF PRESENT ILLNESS Gennaro Chin is a 76 y.o. female. HPI Bronchitis: Denies improvement in symptoms (productive cough, congestion, wheezing) with Prednisone, Robitussin, and Augmentin. Pt followed up with rheumatologist yesterday who prescribed inhaler (effective) and recommended CXR. Ankylosing Spondylitis: Stable. Pt has been holding Methotrexate during current infection. She continues to f/u with Dr. Ok Wilkerson. Hypertension ROS: taking medications as instructed, no medication side effects noted, no TIA's, no chest pain on exertion, no dyspnea on exertion, no swelling of ankles. New concerns:  Patient's BP in office today is 167/80. Diabetic Review of Systems - further diabetic ROS: no polyuria or polydipsia, no chest pain, dyspnea or TIA's, no numbness, tingling or pain in extremities. Other symptoms and concerns: Pt continues on current meds. Review of Systems All other systems reviewed and are negative. Physical Exam  
Constitutional: She is oriented to person, place, and time. She appears well-developed and well-nourished. HENT:  
Head: Normocephalic and atraumatic. Right Ear: External ear normal.  
Left Ear: External ear normal.  
Nose: Nose normal.  
Mouth/Throat: Oropharynx is clear and moist.  
Eyes: Conjunctivae and EOM are normal.  
Neck: Normal range of motion. Neck supple. Carotid bruit is not present. No thyroid mass and no thyromegaly present. Cardiovascular: Normal rate, regular rhythm, S1 normal, S2 normal, normal heart sounds and intact distal pulses. Pulmonary/Chest: Effort normal and breath sounds normal.  
Abdominal: Soft. Normal appearance and bowel sounds are normal. There is no hepatosplenomegaly. There is no tenderness. Musculoskeletal: Normal range of motion. Neurological: She is alert and oriented to person, place, and time. She has normal strength. No cranial nerve deficit or sensory deficit.  Coordination normal.  
 Skin: Skin is warm, dry and intact. No abrasion and no rash noted. Psychiatric: She has a normal mood and affect. Her behavior is normal. Judgment and thought content normal.  
Nursing note and vitals reviewed. ASSESSMENT and PLAN Diagnoses and all orders for this visit: 
 
1. Acute bronchitis due to other specified organisms Prescribed Levaquin. Pt will f/u for CXR. Advised pt to continue on inhaler. -     XR CHEST PA LAT; Future -     levoFLOXacin (LEVAQUIN) 500 mg tablet; Take 1 Tab by mouth daily. 2. Type 2 diabetes mellitus with hyperglycemia, without long-term current use of insulin ([facility] [address] Sugars stable. Continue to follow diabetic diet and monitor sugars. 3. Ankylosing spondylitis, unspecified site of spine ([facility] [address] Stable, and well-managed. No change in medications. Pt will continue to f/u with Dr. Sherlyn Qiu. 4. Essential hypertension BP is at goal. I do not recommend any change in medications. Lab results and schedule of future lab studies reviewed with patient. Reviewed diet, exercise and weight control. Written by Lyndsay Galeano, as dictated by Ayush Zhang MD.  
 
Current diagnosis and concerns discussed with pt at length. Understands risks and benefits or current treatment plan and medications and accepts the treatment and medication with any possible risks. Pt asks appropriate questions which were answered. Pt instructed to call with any concerns or problems. This note will not be viewable in [address]

## 2019-05-21 ENCOUNTER — OFFICE VISIT (OUTPATIENT)
Dept: INTERNAL MEDICINE CLINIC | Age: 69
End: 2019-05-21

## 2019-05-21 VITALS
OXYGEN SATURATION: 96 % | DIASTOLIC BLOOD PRESSURE: 94 MMHG | BODY MASS INDEX: 32.57 KG/M2 | WEIGHT: 177 LBS | HEART RATE: 72 BPM | HEIGHT: 62 IN | RESPIRATION RATE: 20 BRPM | TEMPERATURE: 98.5 F | SYSTOLIC BLOOD PRESSURE: 138 MMHG

## 2019-05-21 DIAGNOSIS — J20.8 ACUTE BRONCHITIS DUE TO OTHER SPECIFIED ORGANISMS: ICD-10-CM

## 2019-05-21 DIAGNOSIS — Z00.00 MEDICARE ANNUAL WELLNESS VISIT, SUBSEQUENT: Primary | ICD-10-CM

## 2019-05-21 DIAGNOSIS — I10 ESSENTIAL HYPERTENSION: ICD-10-CM

## 2019-05-21 DIAGNOSIS — E11.65 TYPE 2 DIABETES MELLITUS WITH HYPERGLYCEMIA, WITHOUT LONG-TERM CURRENT USE OF INSULIN (HCC): ICD-10-CM

## 2019-05-21 DIAGNOSIS — R15.9 INCONTINENCE OF FECES, UNSPECIFIED FECAL INCONTINENCE TYPE: ICD-10-CM

## 2019-05-21 DIAGNOSIS — M45.9 ANKYLOSING SPONDYLITIS, UNSPECIFIED SITE OF SPINE (HCC): ICD-10-CM

## 2019-05-21 NOTE — PROGRESS NOTES
This is the Subsequent Medicare Annual Wellness Exam, performed 12 months or more after the Initial AWV or the last Subsequent AWV    I have reviewed the patient's medical history in detail and updated the computerized patient record. History     Past Medical History:   Diagnosis Date    Ankylosing spondylitis ([address] 2/8/2012    Asthma     Chronic pain     Diabetes ([address]     Essential hypertension, benign     First degree atrioventricular block     GERD (gastroesophageal reflux disease)     Hypercholesteremia     Iritis 1/15/2009    PUD (peptic ulcer disease)     HX ULCERS 1970'S    S/P partial hysterectomy     Sacroiliitis ([address] 9/15/2010    Seizures ([address] CHILD    HX SEIZURES CHILD (NOT CURRENT PROBLEM)    Spinal stenosis, lumbar 5/29/14    mri tamiko at l4-l5    TMJ (dislocation of temporomandibular joint) 9/4/2009    Unspecified adverse effect of anesthesia     woke up durning  cataract and hysterectomy       Past Surgical History:   Procedure Laterality Date    ENDOSCOPY, COLON, DIAGNOSTIC  1/01/07    negative     HX BREAST BIOPSY Right 1990's    benign    HX CATARACT REMOVAL  1992    bilateral  / lens implant    HX GYN  9/28/13    bilat oophorectomy    HX HYSTERECTOMY  2008    HX KNEE ARTHROSCOPY  2003    right    HX ORTHOPAEDIC  8/6/15    LUMBAR LAMINECTOMY L3-4 dr Rexanne Homans HX TONSILLECTOMY  1958     Current Outpatient Medications   Medication Sig Dispense Refill    levoFLOXacin (LEVAQUIN) 500 mg tablet Take 1 Tab by mouth daily. 10 Tab 0    SITagliptin (JANUVIA) 100 mg tablet TAKE 1 TABLET BY MOUTH DAILY 30 Tab 0    SITagliptin (JANUVIA) 100 mg tablet TAKE 1 TABLET BY MOUTH DAILY 90 Tab 5    atorvastatin (LIPITOR) 20 mg tablet TAKE 1 TABLET BY MOUTH EVERY EVENING 90 Tab 0    sertraline (ZOLOFT) 50 mg tablet Take 1 Tab by mouth daily. 90 Tab 2    fluticasone (FLONASE) 50 mcg/actuation nasal spray 2 Sprays by Both Nostrils route daily.  1 Bottle 3    metFORMIN ER (GLUCOPHAGE XR) 500 mg tablet 1 po every day with dinner 90 Tab 1    montelukast (SINGULAIR) 10 mg tablet TAKE 1 TABLET BY MOUTH EVERY DAY 90 Tab 11    Blood-Glucose Meter (FREESTYLE LITE METER) monitoring kit Use to test blood sugar once a day. Dx: E11.65 1 Kit 0    glucose blood VI test strips (FREESTYLE LITE STRIPS) strip Use to test blood sugar once daily. Dx: E11.65 100 Strip 3    Lancets misc Use to test blood sugar once daily. Dx: E11.65 100 Each 3    methotrexate (RHEUMATREX) 2.5 mg tablet Take 6 Tabs by mouth Every Friday. 11    folic acid (FOLVITE) 1 mg tablet Take 2 Tabs by mouth daily. 11    acetaminophen (TYLENOL) 325 mg tablet Take  by mouth every four (4) hours as needed for Pain.  prednisoLONE acetate (PRED FORTE) 1 % ophthalmic suspension Administer 1 Drop to both eyes as needed.  melatonin 5 mg cap capsule Take 5 mg by mouth nightly.  raNITIdine (ZANTAC) 150 mg tablet Take 1 Tab by mouth two (2) times a day. 30 Tab 0    albuterol (PROVENTIL HFA, VENTOLIN HFA, PROAIR HFA) 90 mcg/actuation inhaler Take 1 Puff by inhalation every six (6) hours as needed.       predniSONE (DELTASONE) 20 mg tablet 3 every day for 3 days, 2 every day for 2 days, 1 every day for 2 days, 1/2 every day for 2 days 17 Tab 0     Allergies   Allergen Reactions    Latex Swelling     SWELLING TONGUE, ORAL CAVITY    Benadryl [Diphenhydramine Hcl] Unknown (comments)     Raises heart rate    Atropine Swelling     Eye swelling    Chocolate [Cocoa] Rash    Compazine [Prochlorperazine Edisylate] Other (comments)     Muscle spasm, POSSIBLE SEIZURES    Cymbalta [Duloxetine] Vertigo    Diclofenac Sodium (Bulk) Rash    Milk Rash    Phenobarbital Other (comments)     tachy     Family History   Problem Relation Age of Onset    Diabetes Mother     Heart Disease Mother     Hypertension Father     Cancer Father         PAROTID, LUNG METS    Heart Disease Brother     Anesth Problems Neg Hx      Social History     Tobacco Use    Smoking status: Never Smoker    Smokeless tobacco: Never Used   Substance Use Topics    Alcohol use: Yes     Comment: occassional     Patient Active Problem List   Diagnosis Code    S/P partial hysterectomy Z90.711    HTN (hypertension) I10    Hypercholesterolemia E78.00    Iritis H20.9    TMJ (dislocation of temporomandibular joint) S03. 00XA    Sacroiliitis ([address] M46.1    Essential hypertension, benign I10    First degree atrioventricular block I44.0    Ankylosing spondylitis (HCC) M45.9    Spinal stenosis, lumbar M48.061    Primary osteoarthritis of right knee M17.11    Obesity (BMI 30.0-34. 9) E66.9    Type 2 diabetes mellitus with hyperglycemia, without long-term current use of insulin ([facility] [address] E11.65    Advanced care planning/counseling discussion Z71.89       Depression Risk Factor Screening:     3 most recent PHQ Screens 6/6/2018   Little interest or pleasure in doing things Not at all   Feeling down, depressed, irritable, or hopeless Not at all   Total Score PHQ 2 0     Alcohol Risk Factor Screening: You do not drink alcohol or very rarely. Functional Ability and Level of Safety:   Hearing Loss  Hearing is good. Activities of Daily Living  The home contains: no safety equipment. Patient does total self care    Fall Risk  Fall Risk Assessment, last 12 mths 6/6/2018   Able to walk? Yes   Fall in past 12 months?  No   Fall with injury? -   Number of falls in past 12 months -   Fall Risk Score -       Abuse Screen  Patient is not abused    Cognitive Screening   Evaluation of Cognitive Function:  Has your family/caregiver stated any concerns about your memory: no  Normal    Patient Care Team   Patient Care Team:  Irish Reis MD as PCP - General    Assessment/Plan   Education and counseling provided:  Are appropriate based on today's review and evaluation  End-of-Life planning (with patient's consent)    Diagnoses and all orders for this visit:    1. Acute bronchitis due to other specified organisms    2. Type 2 diabetes mellitus with hyperglycemia, without long-term current use of insulin (HCC)    3. Ankylosing spondylitis, unspecified site of spine ([facility] [address]    4. Essential hypertension        Health Maintenance Due   Topic Date Due    EYE EXAM RETINAL OR DILATED  12[birth year]20/1960    Shingrix Vaccine Age 50> (1 of 2) 12/20/2000    DTaP/Tdap/Td series (1 - Tdap) 04/06/2007    FOOT EXAM Q1  08/14/2018    MEDICARE YEARLY EXAM  08/15/2018    MICROALBUMIN Q1  05/29/2019    COLONOSCOPY  11/06/2019   We are referring to  back for incontinence  She is going to come back and do labs with  in 4-6 weeks  We are deferring shingles vaccine while she is ill    This note will not be viewable in [address]

## 2019-05-21 NOTE — PATIENT INSTRUCTIONS
Medicare Wellness Visit, Female The best way to live healthy is to have a lifestyle where you eat a well-balanced diet, exercise regularly, limit alcohol use, and quit all forms of tobacco/nicotine, if applicable. Regular preventive services are another way to keep healthy. Preventive services (vaccines, screening tests, monitoring & exams) can help personalize your care plan, which helps you manage your own care. Screening tests can find health problems at the earliest stages, when they are easiest to treat. [facility] follows the current, evidence-based guidelines published by the [facility] Jermaine Fercho ([facility]) when recommending preventive services for our patients. Because we follow these guidelines, sometimes recommendations change over time as research supports it. (For example, mammograms used to be recommended annually. Even though Medicare will still pay for an annual mammogram, the newer guidelines recommend a mammogram every two years for women of average risk.) Of course, you and your doctor may decide to screen more often for some diseases, based on your risk and your health status. Preventive services for you include: - Medicare offers their members a free annual wellness visit, which is time for you and your primary care provider to discuss and plan for your preventive service needs. Take advantage of this benefit every year! 
-All adults over the age of 72 should receive the recommended pneumonia vaccines. Current USPSTF guidelines recommend a series of two vaccines for the best pneumonia protection.  
-All adults should have a flu vaccine yearly and a tetanus vaccine every 10 years. All adults age 61 and older should receive a shingles vaccine once in their lifetime.   
-A bone mass density test is recommended when a woman turns 65 to screen for osteoporosis. This test is only recommended one time, as a screening. Some providers will use this same test as a disease monitoring tool if you already have osteoporosis. -All adults age 38-68 who are overweight should have a diabetes screening test once every three years.  
-Other screening tests and preventive services for persons with diabetes include: an eye exam to screen for diabetic retinopathy, a kidney function test, a foot exam, and stricter control over your cholesterol.  
-Cardiovascular screening for adults with routine risk involves an electrocardiogram (ECG) at intervals determined by your doctor.  
-Colorectal cancer screenings should be done for adults age 54-65 with no increased risk factors for colorectal cancer. There are a number of acceptable methods of screening for this type of cancer. Each test has its own benefits and drawbacks. Discuss with your doctor what is most appropriate for you during your annual wellness visit. The different tests include: colonoscopy (considered the best screening method), a fecal occult blood test, a fecal DNA test, and sigmoidoscopy. -Breast cancer screenings are recommended every other year for women of normal risk, age 54-69. 
-Cervical cancer screenings for women over age 72 are only recommended with certain risk factors.  
-All adults born between [facility] should be screened once for Hepatitis C. Here is a list of your current Health Maintenance items (your personalized list of preventive services) with a due date: 
Health Maintenance Due Topic Date Due [facility] Eye Exam  12/20/1960  Shingles Vaccine (1 of 2) 12/20/2000  
 DTaP/Tdap/Td  (1 - Tdap) 04/06/2007 [facility] Diabetic Foot Care  08/14/2018 [facility] Annual Well Visit  08/15/2018  Albumin Urine Test  05/29/2019  Colonoscopy  11/06/2019

## 2019-05-21 NOTE — PROGRESS NOTES
HISTORY OF PRESENT ILLNESS  Pedro Pacheco is a 76 y.o. female. HPI  Bronchitis: Pt reports some improvement in cough. She experienced some abdominal pain since starting on Levaquin. Hypertension ROS: taking medications as instructed, no medication side effects noted, no TIA's, no chest pain on exertion, no dyspnea on exertion, no swelling of ankles. New concerns:  Patient's BP in office today is 138/94. She notes that Benicar is no longer on back order and requests to switch back from Valsartan to Benicar.       Diabetic Review of Systems - further diabetic ROS: no polyuria or polydipsia, no chest pain, dyspnea or TIA's, no numbness, tingling or pain in extremities. Other symptoms and concerns: Pt continues on current meds. She reports sugar levels averaging 200's at home. Ankylosing Spondylitis: Pt has been holding Methotrexate during current infection. She continues to f/u with Dr. Kaur Reeves. Incontinence of Feces: Pt notes fecal incontinence. Review of Systems   All other systems reviewed and are negative. Physical Exam   Constitutional: She is oriented to person, place, and time. She appears well-developed and well-nourished. HENT:   Head: Normocephalic and atraumatic. Right Ear: External ear normal.   Left Ear: External ear normal.   Nose: Nose normal.   Mouth/Throat: Oropharynx is clear and moist.   Eyes: Conjunctivae and EOM are normal.   Neck: Normal range of motion. Neck supple. Carotid bruit is not present. No thyroid mass and no thyromegaly present. Cardiovascular: Normal rate, regular rhythm, normal heart sounds and intact distal pulses. Pulmonary/Chest: Effort normal and breath sounds normal.   Abdominal: Soft. Bowel sounds are normal.   Musculoskeletal: Normal range of motion. Ambulates with quad cane today in clinic. Neurological: She is alert and oriented to person, place, and time. She has normal strength. No cranial nerve deficit or sensory deficit. Coordination normal.   Skin: Skin is warm and dry. No abrasion and no rash noted. Psychiatric: She has a normal mood and affect. Her behavior is normal. Judgment and thought content normal.   Nursing note and vitals reviewed. ASSESSMENT and PLAN  Diagnoses and all orders for this visit:    1. Acute bronchitis due to other specified organisms  Stable, improving condition. No change in medications. Pt will schedule 4-6 week f/u with Dr. Kiesha Villavicencio to reassess condition. 2. Type 2 diabetes mellitus with hyperglycemia, without long-term current use of insulin ([facility])  Sugars stable. Continue to follow diabetic diet and monitor sugars. 3. Ankylosing spondylitis, unspecified site of spine ([address]  Stable, and well-managed. No change in medications. Pt will continue to f/u with Dr. Bev Kelly. 4. Essential hypertension  BP is at goal. Discussed that pt can switch back from Valsartan to Benicar (which is no longer on back order). Stop hctz as in the benicar    5. Incontinence of feces, unspecified fecal incontinence type  Pt will f/u with GI (Dr. Eber Crain) for further evaluation. May have rectal prolapse  ? Yaya Quinton may help        Lab results and schedule of future lab studies reviewed with patient. Reviewed diet, exercise and weight control. Written by Rafal Pandya, as dictated by Samir Pizano MD.     Current diagnosis and concerns discussed with pt at length. Understands risks and benefits or current treatment plan and medications and accepts the treatment and medication with any possible risks. Pt asks appropriate questions which were answered. Pt instructed to call with any concerns or problems. This note will not be viewable in [address]

## 2019-06-18 ENCOUNTER — HOSPITAL ENCOUNTER (OUTPATIENT)
Dept: CT IMAGING | Age: 69
Discharge: HOME | End: 2019-06-18
Attending: NURSE PRACTITIONER
Payer: MEDICARE

## 2019-06-18 DIAGNOSIS — M45.9 ANKYLOSING SPONDYLITIS (HCC): ICD-10-CM

## 2019-06-18 DIAGNOSIS — R10.32 LEFT LOWER QUADRANT PAIN: ICD-10-CM

## 2019-06-18 DIAGNOSIS — R19.7 DIARRHEA: ICD-10-CM

## 2019-06-18 DIAGNOSIS — D84.89 PRIMARY IMMUNE DEFICIENCY DISORDER (HCC): ICD-10-CM

## 2019-06-18 PROCEDURE — 74011000255 HC RX REV CODE- 255: Performed by: NURSE PRACTITIONER

## 2019-06-18 PROCEDURE — 74177 CT ABD & PELVIS W/CONTRAST: CPT

## 2019-06-18 PROCEDURE — 74011636320 HC RX REV CODE- 636/320: Performed by: NURSE PRACTITIONER

## 2019-06-18 RX ORDER — SODIUM CHLORIDE 0.9 % (FLUSH) 0.9 %
10 SYRINGE (ML) INJECTION
Status: COMPLETED | OUTPATIENT
Start: 2019-06-18 | End: 2019-06-18

## 2019-06-18 RX ORDER — BARIUM SULFATE 20 MG/ML
900 SUSPENSION ORAL
Status: COMPLETED | OUTPATIENT
Start: 2019-06-18 | End: 2019-06-18

## 2019-06-18 RX ORDER — SODIUM CHLORIDE 0.9 % (FLUSH) 0.9 %
10 SYRINGE (ML) INJECTION
Status: DISCONTINUED | OUTPATIENT
Start: 2019-06-18 | End: 2019-06-18

## 2019-06-18 RX ADMIN — Medication 10 ML: at 13:04

## 2019-06-18 RX ADMIN — IOPAMIDOL 100 ML: 755 INJECTION, SOLUTION INTRAVENOUS at 13:03

## 2019-06-18 RX ADMIN — BARIUM SULFATE 900 ML: 21 SUSPENSION ORAL at 13:04

## 2019-06-25 ENCOUNTER — OFFICE VISIT (OUTPATIENT)
Dept: INTERNAL MEDICINE CLINIC | Age: 69
End: 2019-06-25

## 2019-06-25 ENCOUNTER — HOSPITAL ENCOUNTER (OUTPATIENT)
Dept: LAB | Age: 69
Discharge: HOME | End: 2019-06-25
Payer: MEDICARE

## 2019-06-25 VITALS
RESPIRATION RATE: 16 BRPM | TEMPERATURE: 98.4 F | HEIGHT: 62 IN | SYSTOLIC BLOOD PRESSURE: 112 MMHG | DIASTOLIC BLOOD PRESSURE: 81 MMHG | HEART RATE: 77 BPM | BODY MASS INDEX: 31.83 KG/M2 | WEIGHT: 173 LBS | OXYGEN SATURATION: 96 %

## 2019-06-25 DIAGNOSIS — E78.5 DYSLIPIDEMIA, GOAL LDL BELOW 70: ICD-10-CM

## 2019-06-25 DIAGNOSIS — R19.7 DIARRHEA, UNSPECIFIED TYPE: ICD-10-CM

## 2019-06-25 DIAGNOSIS — I10 ESSENTIAL HYPERTENSION: ICD-10-CM

## 2019-06-25 DIAGNOSIS — M45.9 ANKYLOSING SPONDYLITIS, UNSPECIFIED SITE OF SPINE (HCC): ICD-10-CM

## 2019-06-25 DIAGNOSIS — E11.65 TYPE 2 DIABETES MELLITUS WITH HYPERGLYCEMIA, WITHOUT LONG-TERM CURRENT USE OF INSULIN (HCC): Primary | ICD-10-CM

## 2019-06-25 PROCEDURE — 84443 ASSAY THYROID STIM HORMONE: CPT

## 2019-06-25 PROCEDURE — 80053 COMPREHEN METABOLIC PANEL: CPT

## 2019-06-25 PROCEDURE — 80061 LIPID PANEL: CPT

## 2019-06-25 PROCEDURE — 83036 HEMOGLOBIN GLYCOSYLATED A1C: CPT

## 2019-06-25 PROCEDURE — 82043 UR ALBUMIN QUANTITATIVE: CPT

## 2019-06-25 RX ORDER — HYDROCODONE BITARTRATE AND ACETAMINOPHEN 5; 325 MG/1; MG/1
1 TABLET ORAL
Refills: 0 | COMMUNITY
Start: 2019-05-13 | End: 2020-04-07 | Stop reason: ALTCHOICE

## 2019-06-25 RX ORDER — METFORMIN HYDROCHLORIDE 500 MG/1
TABLET, EXTENDED RELEASE ORAL
Qty: 180 TAB | Refills: 1 | Status: SHIPPED | OUTPATIENT
Start: 2019-06-25 | End: 2020-01-13

## 2019-06-25 RX ORDER — OLMESARTAN MEDOXOMIL AND HYDROCHLOROTHIAZIDE 40/25 40; 25 MG/1; MG/1
1 TABLET ORAL DAILY
Refills: 5 | COMMUNITY
Start: 2019-05-09 | End: 2019-11-06 | Stop reason: SDUPTHER

## 2019-06-25 NOTE — PROGRESS NOTES
HISTORY OF PRESENT ILLNESS  Qing Carcamo is a 76 y.o. female. HPI  Seen for follow up on numerous issues:  1. She was treated in May for bronchitis, had Levaquin and steroid pack. Feels that her breathing is back to baseline, no residual cough. 2. Diarrhea, which has been going on for months. Predates the Metformin and Sertraline use. She has seen Dr. Deidre Ch, had a CT scan and stool studies, which were negative, and is scheduled for colonoscopy July 16th. During the evaluation with GI she has been asked to stay off Remicade and her Methotrexate and her arthritis has really flared with increased stiffness of joints and trouble moving. She has not been back in touch with Dr. Abhijeet Andrew about possibly resuming any of these meds. 3. Dyslipidemia. Remains on Lipitor 20 mg. Again, increased muscle stiffness, but coincident with timing of being off of rheumatologic meds. Did not start with initiating the Lipitor. 4. Diabetes. She is expecting her sugars are up. Currently on two Metformin a day and Januvia a day. Has not checked sugars recently, but notes dietary indiscretion. Review of Systems   Constitutional: Positive for malaise/fatigue. Negative for chills, diaphoresis, fever and weight loss. Respiratory: Negative for cough, shortness of breath and wheezing. Cardiovascular: Negative for chest pain, palpitations, orthopnea, leg swelling and PND. Gastrointestinal: Positive for diarrhea. Negative for blood in stool, heartburn, melena, nausea and vomiting. Genitourinary: Negative for dysuria. Musculoskeletal: Positive for joint pain. Negative for myalgias. Skin: Negative for rash. Neurological: Negative for dizziness and headaches. Psychiatric/Behavioral: Negative for depression. Physical Exam   Constitutional: She is oriented to person, place, and time. She appears well-developed and well-nourished. HENT:   Head: Normocephalic and atraumatic.    Right Ear: External ear normal. Left Ear: External ear normal.   Mouth/Throat: Oropharynx is clear and moist.   Neck: Normal range of motion. Neck supple. Carotid bruit is not present. No thyromegaly present. Cardiovascular: Normal rate, regular rhythm, S1 normal, S2 normal, normal heart sounds and intact distal pulses. No murmur heard. Pulmonary/Chest: Effort normal and breath sounds normal. No respiratory distress. She has no wheezes. She has no rales. Abdominal: Soft. She exhibits no distension and no mass. There is no tenderness. Musculoskeletal: She exhibits no edema. Moving very slowly to get on and off exam table   Neurological: She is alert and oriented to person, place, and time. Psychiatric: She has a normal mood and affect. Her behavior is normal.   Nursing note and vitals reviewed. ASSESSMENT and PLAN  Diagnoses and all orders for this visit:    1. Type 2 diabetes mellitus with hyperglycemia, without long-term current use of insulin (HCC)  -     MICROALBUMIN, UR, RAND W/ MICROALB/CREAT RATIO  -     HEMOGLOBIN A1C WITH EAG  -     metFORMIN ER (GLUCOPHAGE XR) 500 mg tablet; 2 po every day with dinner    2. Essential hypertension-controlled on current med    3. Ankylosing spondylitis, unspecified site of spine (HCC)-flared off supressive  meds she will contact gi and rheumatology re at least resuming the methotrexate to help    4. Dyslipidemia, goal LDL below 70  -     METABOLIC PANEL, COMPREHENSIVE  -     LIPID PANEL  -     TSH 3RD GENERATION    5.  Diarrhea, unspecified type    Colonoscopy set for July 16  appt here in 3 mo  Discussed could inc metformin to 3 per day as she does  Not feel this med has added to her gi sxs

## 2019-06-26 LAB
ALBUMIN SERPL-MCNC: 4.6 G/DL (ref 3.6–4.8)
ALBUMIN/CREAT UR: 9.7 MG/G CREAT (ref 0–30)
ALBUMIN/GLOB SERPL: 2.2 {RATIO} (ref 1.2–2.2)
ALP SERPL-CCNC: 56 IU/L (ref 39–117)
ALT SERPL-CCNC: 20 IU/L (ref 0–32)
AST SERPL-CCNC: 16 IU/L (ref 0–40)
BILIRUB SERPL-MCNC: 0.8 MG/DL (ref 0–1.2)
BUN SERPL-MCNC: 19 MG/DL (ref 8–27)
BUN/CREAT SERPL: 20 (ref 12–28)
CALCIUM SERPL-MCNC: 9.4 MG/DL (ref 8.7–10.3)
CHLORIDE SERPL-SCNC: 101 MMOL/L (ref 96–106)
CHOLEST SERPL-MCNC: 170 MG/DL (ref 100–199)
CO2 SERPL-SCNC: 24 MMOL/L (ref 20–29)
CREAT SERPL-MCNC: 0.95 MG/DL (ref 0.57–1)
CREAT UR-MCNC: 325.5 MG/DL
EST. AVERAGE GLUCOSE BLD GHB EST-MCNC: 157 MG/DL
GLOBULIN SER CALC-MCNC: 2.1 G/DL (ref 1.5–4.5)
GLUCOSE SERPL-MCNC: 160 MG/DL (ref 65–99)
HBA1C MFR BLD: 7.1 % (ref 4.8–5.6)
HDLC SERPL-MCNC: 40 MG/DL
INTERPRETATION, 910389: NORMAL
LDLC SERPL CALC-MCNC: 86 MG/DL (ref 0–99)
Lab: NORMAL
MICROALBUMIN UR-MCNC: 31.5 UG/ML
POTASSIUM SERPL-SCNC: 3.6 MMOL/L (ref 3.5–5.2)
PROT SERPL-MCNC: 6.7 G/DL (ref 6–8.5)
SODIUM SERPL-SCNC: 141 MMOL/L (ref 134–144)
TRIGL SERPL-MCNC: 219 MG/DL (ref 0–149)
TSH SERPL DL<=0.005 MIU/L-ACNC: 1.58 UIU/ML (ref 0.45–4.5)
VLDLC SERPL CALC-MCNC: 44 MG/DL (ref 5–40)

## 2019-07-04 RX ORDER — ATORVASTATIN CALCIUM 20 MG/1
TABLET, FILM COATED ORAL
Qty: 90 TAB | Refills: 0 | Status: SHIPPED | OUTPATIENT
Start: 2019-07-04 | End: 2019-07-12

## 2019-07-12 ENCOUNTER — ANESTHESIA EVENT (OUTPATIENT)
Dept: ENDOSCOPY | Age: 69
End: 2019-07-12
Payer: MEDICARE

## 2019-07-12 RX ORDER — ATORVASTATIN CALCIUM 20 MG/1
10 TABLET, FILM COATED ORAL
COMMUNITY
End: 2020-04-07 | Stop reason: ALTCHOICE

## 2019-07-12 RX ORDER — MINERAL OIL
180 ENEMA (ML) RECTAL DAILY
COMMUNITY

## 2019-07-13 NOTE — ANESTHESIA PREPROCEDURE EVALUATION
Relevant Problems   No relevant active problems       Anesthetic History   No history of anesthetic complications            Review of Systems / Medical History  Patient summary reviewed, nursing notes reviewed and pertinent labs reviewed    Pulmonary            Asthma        Neuro/Psych     seizures         Cardiovascular    Hypertension          Hyperlipidemia         GI/Hepatic/Renal     GERD      PUD     Endo/Other    Diabetes    Obesity and arthritis     Other Findings              Physical Exam    Airway  Mallampati: II  TM Distance: > 6 cm  Neck ROM: normal range of motion   Mouth opening: Normal     Cardiovascular  Regular rate and rhythm,  S1 and S2 normal,  no murmur, click, rub, or gallop             Dental  No notable dental hx       Pulmonary  Breath sounds clear to auscultation               Abdominal  GI exam deferred       Other Findings            Anesthetic Plan    ASA: 2  Anesthesia type: MAC            Anesthetic plan and risks discussed with: Patient

## 2019-07-15 ENCOUNTER — HOSPITAL ENCOUNTER (OUTPATIENT)
Age: 69
Setting detail: OUTPATIENT SURGERY
Discharge: HOME | End: 2019-07-15
Attending: SPECIALIST | Admitting: SPECIALIST
Payer: MEDICARE

## 2019-07-15 ENCOUNTER — ANESTHESIA (OUTPATIENT)
Dept: ENDOSCOPY | Age: 69
End: 2019-07-15
Payer: MEDICARE

## 2019-07-15 VITALS
SYSTOLIC BLOOD PRESSURE: 153 MMHG | BODY MASS INDEX: 31.83 KG/M2 | HEART RATE: 68 BPM | TEMPERATURE: 98.6 F | OXYGEN SATURATION: 96 % | WEIGHT: 174 LBS | DIASTOLIC BLOOD PRESSURE: 78 MMHG | RESPIRATION RATE: 13 BRPM

## 2019-07-15 PROCEDURE — 74011250636 HC RX REV CODE- 250/636

## 2019-07-15 PROCEDURE — 76060000032 HC ANESTHESIA 0.5 TO 1 HR: Performed by: SPECIALIST

## 2019-07-15 PROCEDURE — 74011250637 HC RX REV CODE- 250/637: Performed by: SPECIALIST

## 2019-07-15 PROCEDURE — 76040000007: Performed by: SPECIALIST

## 2019-07-15 PROCEDURE — 77030027957 HC TBNG IRR ENDOGTR BUSS -B: Performed by: SPECIALIST

## 2019-07-15 PROCEDURE — 88305 TISSUE EXAM BY PATHOLOGIST: CPT

## 2019-07-15 PROCEDURE — 77030021593 HC FCPS BIOP ENDOSC BSC -A: Performed by: SPECIALIST

## 2019-07-15 RX ORDER — NALOXONE HYDROCHLORIDE 0.4 MG/ML
0.4 INJECTION, SOLUTION INTRAMUSCULAR; INTRAVENOUS; SUBCUTANEOUS
Status: DISCONTINUED | OUTPATIENT
Start: 2019-07-15 | End: 2019-07-15 | Stop reason: HOSPADM

## 2019-07-15 RX ORDER — ATROPINE SULFATE 0.1 MG/ML
0.5 INJECTION INTRAVENOUS
Status: DISCONTINUED | OUTPATIENT
Start: 2019-07-15 | End: 2019-07-15 | Stop reason: HOSPADM

## 2019-07-15 RX ORDER — FLUMAZENIL 0.1 MG/ML
0.2 INJECTION INTRAVENOUS
Status: DISCONTINUED | OUTPATIENT
Start: 2019-07-15 | End: 2019-07-15 | Stop reason: HOSPADM

## 2019-07-15 RX ORDER — LIDOCAINE HYDROCHLORIDE 20 MG/ML
INJECTION, SOLUTION EPIDURAL; INFILTRATION; INTRACAUDAL; PERINEURAL AS NEEDED
Status: DISCONTINUED | OUTPATIENT
Start: 2019-07-15 | End: 2019-07-15 | Stop reason: HOSPADM

## 2019-07-15 RX ORDER — PROPOFOL 10 MG/ML
INJECTION, EMULSION INTRAVENOUS AS NEEDED
Status: DISCONTINUED | OUTPATIENT
Start: 2019-07-15 | End: 2019-07-15 | Stop reason: HOSPADM

## 2019-07-15 RX ORDER — SODIUM CHLORIDE 0.9 % (FLUSH) 0.9 %
5-40 SYRINGE (ML) INJECTION AS NEEDED
Status: DISCONTINUED | OUTPATIENT
Start: 2019-07-15 | End: 2019-07-15 | Stop reason: HOSPADM

## 2019-07-15 RX ORDER — SODIUM CHLORIDE 9 MG/ML
INJECTION, SOLUTION INTRAVENOUS
Status: DISCONTINUED | OUTPATIENT
Start: 2019-07-15 | End: 2019-07-15 | Stop reason: HOSPADM

## 2019-07-15 RX ORDER — MIDAZOLAM HYDROCHLORIDE 1 MG/ML
.25-5 INJECTION, SOLUTION INTRAMUSCULAR; INTRAVENOUS
Status: DISCONTINUED | OUTPATIENT
Start: 2019-07-15 | End: 2019-07-15 | Stop reason: HOSPADM

## 2019-07-15 RX ORDER — SODIUM CHLORIDE 0.9 % (FLUSH) 0.9 %
5-40 SYRINGE (ML) INJECTION EVERY 8 HOURS
Status: DISCONTINUED | OUTPATIENT
Start: 2019-07-15 | End: 2019-07-15 | Stop reason: HOSPADM

## 2019-07-15 RX ORDER — FENTANYL CITRATE 50 UG/ML
12.5-5 INJECTION, SOLUTION INTRAMUSCULAR; INTRAVENOUS
Status: DISCONTINUED | OUTPATIENT
Start: 2019-07-15 | End: 2019-07-15 | Stop reason: HOSPADM

## 2019-07-15 RX ORDER — EPINEPHRINE 0.1 MG/ML
1 INJECTION INTRACARDIAC; INTRAVENOUS
Status: DISCONTINUED | OUTPATIENT
Start: 2019-07-15 | End: 2019-07-15 | Stop reason: HOSPADM

## 2019-07-15 RX ORDER — SODIUM CHLORIDE 9 MG/ML
50 INJECTION, SOLUTION INTRAVENOUS CONTINUOUS
Status: DISCONTINUED | OUTPATIENT
Start: 2019-07-15 | End: 2019-07-15 | Stop reason: HOSPADM

## 2019-07-15 RX ORDER — DEXTROMETHORPHAN/PSEUDOEPHED 2.5-7.5/.8
1.2 DROPS ORAL
Status: DISCONTINUED | OUTPATIENT
Start: 2019-07-15 | End: 2019-07-15 | Stop reason: HOSPADM

## 2019-07-15 RX ADMIN — PROPOFOL 30 MG: 10 INJECTION, EMULSION INTRAVENOUS at 07:58

## 2019-07-15 RX ADMIN — PROPOFOL 30 MG: 10 INJECTION, EMULSION INTRAVENOUS at 07:40

## 2019-07-15 RX ADMIN — PROPOFOL 30 MG: 10 INJECTION, EMULSION INTRAVENOUS at 07:44

## 2019-07-15 RX ADMIN — PROPOFOL 30 MG: 10 INJECTION, EMULSION INTRAVENOUS at 07:54

## 2019-07-15 RX ADMIN — PROPOFOL 30 MG: 10 INJECTION, EMULSION INTRAVENOUS at 07:37

## 2019-07-15 RX ADMIN — SODIUM CHLORIDE: 9 INJECTION, SOLUTION INTRAVENOUS at 07:25

## 2019-07-15 RX ADMIN — PROPOFOL 30 MG: 10 INJECTION, EMULSION INTRAVENOUS at 07:46

## 2019-07-15 RX ADMIN — PROPOFOL 30 MG: 10 INJECTION, EMULSION INTRAVENOUS at 07:39

## 2019-07-15 RX ADMIN — PROPOFOL 30 MG: 10 INJECTION, EMULSION INTRAVENOUS at 07:36

## 2019-07-15 RX ADMIN — PROPOFOL 30 MG: 10 INJECTION, EMULSION INTRAVENOUS at 07:52

## 2019-07-15 RX ADMIN — SIMETHICONE 80 MG: 20 SUSPENSION/ DROPS ORAL at 07:45

## 2019-07-15 RX ADMIN — PROPOFOL 30 MG: 10 INJECTION, EMULSION INTRAVENOUS at 07:42

## 2019-07-15 RX ADMIN — PROPOFOL 50 MG: 10 INJECTION, EMULSION INTRAVENOUS at 07:35

## 2019-07-15 RX ADMIN — PROPOFOL 30 MG: 10 INJECTION, EMULSION INTRAVENOUS at 07:48

## 2019-07-15 RX ADMIN — PROPOFOL 30 MG: 10 INJECTION, EMULSION INTRAVENOUS at 07:50

## 2019-07-15 RX ADMIN — LIDOCAINE HYDROCHLORIDE 100 MG: 20 INJECTION, SOLUTION EPIDURAL; INFILTRATION; INTRACAUDAL; PERINEURAL at 07:35

## 2019-07-15 RX ADMIN — PROPOFOL 30 MG: 10 INJECTION, EMULSION INTRAVENOUS at 07:56

## 2019-07-15 NOTE — PROGRESS NOTES

## 2019-07-15 NOTE — PROCEDURES
Colonoscopy Procedure Note    Indications:   Diarrhea, Personal history of colon polyps (screening only)  Referring Physician: Elif Johnson MD   Anesthesia/Sedation:MAC  Endoscopist:  Dr. Wilmer Montemayor  Assistant:  Endoscopy Technician-1: Lila Green  Endoscopy RN-1: Katie Mcdaniel RN  Surgical Assistant: None  Implants: None    Preoperative diagnosis: DIARRHEA    Postoperative diagnosis: 1. sigmoid diverticulosis      Procedure in Detail:  Informed consent was obtained for the procedure, including sedation. Risks of perforation, hemorrhage, adverse drug reaction, and aspiration were discussed. The patient was placed in the left lateral decubitus position. Based on the pre-procedure assessment, including review of the patient's medical history, medications, allergies, and review of systems, she had been deemed to be an appropriate candidate for moderate sedation; she was therefore sedated with the medications listed above. The patient was monitored continuously with ECG tracing, pulse oximetry, blood pressure monitoring, and direct observations. A rectal examination was performed. The [id number] was inserted into the rectum and advanced under direct vision to the terminal ileum. The quality of the colonic preparation was excellent. A careful inspection was made as the colonoscope was withdrawn, including a retroflexed view of the rectum; findings and interventions are described below. Findings: Random Bx to r/o microscopic colitis  Rectum: normal  Sigmoid: moderate diverticulosis; Descending Colon: normal  Transverse Colon: normal  Ascending Colon: normal  Cecum: normal  Terminal Ileum: normal proximally 15 cm    Specimens:     see above    EBL: None    Complications: None; patient tolerated the procedure well. Recommendations:     - Await pathology. - Repeat colonoscopy in 5 years.     - Follow up with Dr. Julia Mccullough in office.     - If < 10 years, reason: above average risk patient      - Start taking Imodium AD 2 tablets each am and then after each loose stool not mor then 6 a day   - There are several other meds that might benefit you if Imodium is failing    Signed By: Bladimir Still MD                        July 15, 2019

## 2019-07-15 NOTE — DISCHARGE INSTRUCTIONS
Onur Staples  [id number]  1950    COLON DISCHARGE INSTRUCTIONS  Discomfort:  Redness at IV site- apply warm compress to area; if redness or soreness persist- contact your physician  There may be a slight amount of blood passed from the rectum  Gaseous discomfort- walking, belching will help relieve any discomfort  You may not operate a vehicle for 12 hours  You may not engage in an occupation involving machinery or appliances for rest of today  You may not drink alcoholic beverages for at least 12 hours  Avoid making any critical decisions for at least 24 hour  DIET:   Regular diet. - however -  remember your colon is empty and a heavy meal will produce gas. Avoid these foods:  vegetables, fried / greasy foods, carbonated drinks for today. ACTIVITY:  You may resume your normal daily activities it is recommended that you spend the remainder of the day resting -  avoid any strenuous activity. CALL M.D. ANY SIGN OF:  Increasing pain, nausea, vomiting  Abdominal distension (swelling)  New increased bleeding (oral or rectal)  Fever (chills)  Pain in chest area  Bloody discharge from nose or mouth  Shortness of breath    You may  take any Advil, Aspirin, Ibuprofen, Motrin, Aleve, Goodys, or any similar pain or arthritis products orTylenol as needed for pain. Follow-up Instructions:   Call Dr. Raúl Khalil  Results of procedure / biopsy in 10-14days   Office telephone for problems or questions [phone]      - Await pathology. - Repeat colonoscopy in 5 years.     - Follow up with Dr. Raúl Khalil in office.     - If < 10 years, reason: above average risk patient   - Start taking Imodium AD 2 tablets each am and then after each loose stool not mor then 6 a day   - There are several other meds that might benefit you if Imodium is failing    Patient Education        Diverticulosis: Care Instructions  Your Care Instructions  In diverticulosis, pouches called diverticula form in the wall of the large intestine (colon). The pouches do not cause any pain or other symptoms. Most people who have diverticulosis do not know they have it. But the pouches sometimes bleed, and if they become infected, they can cause pain and other symptoms. When this happens, it is called diverticulitis. Diverticula form when pressure pushes the wall of the colon outward at certain weak points. A diet that is too low in fiber can cause diverticula. Follow-up care is a key part of your treatment and safety. Be sure to make and go to all appointments, and call your doctor if you are having problems. It's also a good idea to know your test results and keep a list of the medicines you take. How can you care for yourself at home? · Include fruits, leafy green vegetables, beans, and whole grains in your diet each day. These foods are high in fiber. · Take a fiber supplement, such as Citrucel or Metamucil, every day if needed. Read and follow all instructions on the label. · Drink plenty of fluids, enough so that your urine is light yellow or clear like water. If you have kidney, heart, or liver disease and have to limit fluids, talk with your doctor before you increase the amount of fluids you drink. · Get at least 30 minutes of exercise on most days of the week. Walking is a good choice. You also may want to do other activities, such as running, swimming, cycling, or playing tennis or team sports. · Cut out foods that cause gas, pain, or other symptoms. When should you call for help?   Call your doctor now or seek immediate medical care if:    · You have belly pain.     · You pass maroon or very bloody stools.     · You have a fever.     · You have nausea and vomiting.     · You have unusual changes in your bowel movements or abdominal swelling.     · You have burning pain when you urinate.     · You have abnormal vaginal discharge.     · You have shoulder pain.     · You have cramping pain that does not get better when you have a bowel movement or pass gas.     · You pass gas or stool from your urethra while urinating.    Watch closely for changes in your health, and be sure to contact your doctor if you have any problems. Where can you learn more? Go to http://zara-nelly.info/. Enter S696 in the search box to learn more about \"Diverticulosis: Care Instructions. \"  Current as of: March 27, 2018  Content Version: 11.9  © 4854-9535 [employer], [employer]. Care instructions adapted under license by [employer] (which disclaims liability or warranty for this information). If you have questions about a medical condition or this instruction, always ask your healthcare professional. [place] [address] any warranty or liability for your use of this information.

## 2019-07-15 NOTE — H&P
Pre-endoscopy H and P for Colonoscopy    The patient was seen and examined. Date of last colonoscopy: 2014, Polyps  No      The airway was assessed and documented. The problem list, past medical history, and medications were reviewed. Patient Active Problem List   Diagnosis Code    S/P partial hysterectomy Z90.711    HTN (hypertension) I10    Hypercholesterolemia E78.00    Iritis H20.9    TMJ (dislocation of temporomandibular joint) S03. 00XA    Sacroiliitis ([address] M46.1    Essential hypertension, benign I10    First degree atrioventricular block I44.0    Ankylosing spondylitis (HCC) M45.9    Spinal stenosis, lumbar M48.061    Primary osteoarthritis of right knee M17.11    Obesity (BMI 30.0-34. 9) E66.9    Type 2 diabetes mellitus with hyperglycemia, without long-term current use of insulin (HCC) E11.65    Advanced care planning/counseling discussion Z70.80     Social History     Socioeconomic History    Marital status:      Spouse name: Not on file    Number of children: 3    Years of education: Not on file    Highest education level: Not on file   Occupational History    Occupation: retired special ,3 children     Comment: walking,reading,embroiding   Social Needs    Financial resource strain: Not on file    Food insecurity:     Worry: Not on file     Inability: Not on file   [employer] needs:     Medical: Not on file     Non-medical: Not on file   Tobacco Use    Smoking status: Never Smoker    Smokeless tobacco: Never Used   Substance and Sexual Activity    Alcohol use: Not Currently    Drug use: Yes     Types: Prescription, OTC    Sexual activity: Not on file   Lifestyle    Physical activity:     Days per week: Not on file     Minutes per session: Not on file    Stress: Not on file   Relationships    Social connections:     Talks on phone: Not on file     Gets together: Not on file     Attends Rastafari service: Not on file     Active member of club or organization: Not on file     Attends meetings of clubs or organizations: Not on file     Relationship status: Not on file    Intimate partner violence:     Fear of current or ex partner: Not on file     Emotionally abused: Not on file     Physically abused: Not on file     Forced sexual activity: Not on file   Other Topics Concern    Not on file   Social History Narrative    Not on file     Past Medical History:   Diagnosis Date    [facility]ylosing sp[address]ylitis ([facility] 2/8/2012    Asthma     Autoimmune disease ([facility] [address]     ankylosing spondylitis    Chronic pain     spondylitis    Diabetes ([facility] [address] [facility]     Essential hypertension, benign     First degree atrioventricular block     GERD (gastroesophageal reflux disease)     Hypercholesteremia     Ill-defined condition     has a rib cage thus does not always breathe deep    Ill-defined condition     diarrhea x 5 months    Iritis 1/15/2009    PUD (peptic ulcer disease)     HX ULCERS 1970'S    S/P partial hysterectomy     Sacroiliitis ([facility] [facility] 9/15/2010    Seizures ([facility] [address] CHILD    HX SEIZURES CHILD (NOT CURRENT PROBLEM)    Spinal stenosis, lumbar 5/29/14    mri tamiko at l4-l5    TMJ (dislocation of temporomandibular joint) 9/4/2009    Unspecified adverse effect of anesthesia     woke up durning  cataract and hysterectomy      The patient has a family history of na    Prior to Admission Medications   Prescriptions Last Dose Informant Patient Reported? Taking? Blood-Glucose Meter (FREESTYLE LITE METER) monitoring kit Not Taking at Unknown time  No No   Sig: Use to test blood sugar once a day. Dx: E11.65   HYDROcodone-acetaminophen (NORCO) 5-325 mg per tablet 6/15/2019 at Unknown time  Yes Yes   Sig: Take 1 Tab by mouth every eight (8) hours as needed for Pain. Lancets misc Not Taking at Unknown time  No No   Sig: Use to test blood sugar once daily.  Dx: E11.65   SITagliptin (JANUVIA) 100 mg tablet 7/13/2019  No Yes   Sig: TAKE 1 TABLET BY MOUTH DAILY   acetaminophen (TYLENOL) 325 mg tablet 2019 at Unknown time  Yes Yes   Sig: Take 325 mg by mouth as needed for Pain. albuterol (PROVENTIL HFA, VENTOLIN HFA, PROAIR HFA) 90 mcg/actuation inhaler Not Taking at Unknown time  Yes No   Sig: Take 1 Puff by inhalation every six (6) hours as needed. atorvastatin (LIPITOR) 20 mg tablet 2019 at Unknown time  Yes Yes   Sig: Take 10 mg by mouth nightly. fexofenadine (ALLEGRA) 180 mg tablet 2019 at Unknown time  Yes Yes   Sig: Take 180 mg by mouth daily. fluticasone (FLONASE SENSIMIST) 27.5 mcg/actuation nasal spray 2019 at Unknown time  Yes Yes   Si Sprays by Both Nostrils route daily. glucose blood VI test strips (FREESTYLE LITE STRIPS) strip Not Taking at Unknown time  No No   Sig: Use to test blood sugar once daily. Dx: E11.65   melatonin 5 mg cap capsule 2019 at Unknown time  Yes Yes   Sig: Take 5 mg by mouth nightly. metFORMIN ER (GLUCOPHAGE XR) 500 mg tablet 2019  No Yes   Si po every day with dinner   montelukast (SINGULAIR) 10 mg tablet 2019 at Unknown time  No Yes   Sig: TAKE 1 TABLET BY MOUTH EVERY DAY   olmesartan-hydroCHLOROthiazide (BENICAR HCT) 40-25 mg per tablet 2019 at Unknown time  Yes Yes   Sig: Take 1 Tab by mouth daily. olmesartan medox/hctz   raNITIdine (ZANTAC) 150 mg tablet 2019 at Unknown time  No Yes   Sig: Take 1 Tab by mouth two (2) times a day. sertraline (ZOLOFT) 50 mg tablet 2019 at Unknown time  No Yes   Sig: Take 1 Tab by mouth daily. Facility-Administered Medications: None         The review of systems is:  negative for shortness of breath or chest pain      The heart, lungs and mental status were satisfactory for the administration of MAC sedation and for the procedure. Mallampati score: See Anesthesia. I discussed with the patient the objectives, risks, consequences and alternatives to the procedure. Plan: Endoscopic procedure with MAC sedation.     Sierra Bedoya, MD  7/15/2019  7:36 AM

## 2019-07-15 NOTE — ROUTINE PROCESS
Onur Staples 1950 
[id number] Situation: 
Verbal report received from: Deepika Cabrera Procedure: Procedure(s): 
COLONOSCOPY 
COLON BIOPSY Background: 
 
Preoperative diagnosis: DIARRHEA Postoperative diagnosis: 1. sigmoid diverticulosis :  Dr. Raúl Khalil Assistant(s): Endoscopy Technician-1: Elana Welch Endoscopy RN-1: Mila Prasad RN Specimens:  
ID Type Source Tests Collected by Time Destination 1 : random colon biopsies Preservative   Jon Arndt MD 7/15/2019 0740 Pathology H. Pylori  no Assessment: 
Intra-procedure medications Anesthesia gave intra-procedure sedation and medications, see anesthesia flow sheet yes Intravenous fluids: NS@ Roddie Sandra Vital signs stable yes Abdominal assessment: round and soft  yes Recommendation: 
Discharge patient per MD order yes . Return to floor na Family or Friend fam 
Permission to share finding with family or friend yes

## 2019-07-15 NOTE — ANESTHESIA POSTPROCEDURE EVALUATION
Post-Anesthesia Evaluation and Assessment    Patient: Drew Elder MRN: [id number]  SSN: [id number]    YOB: 1950  Age: 76 y.o. Sex: female      I have evaluated the patient and they are stable and ready for discharge from the PACU. Cardiovascular Function/Vital Signs  Visit Vitals  /78   Pulse 68   Temp 37 °C (98.6 °F)   Resp 13   Wt 78.9 kg (174 lb)   SpO2 96%   BMI 31.83 kg/m²       Patient is status post MAC anesthesia for Procedure(s):  COLONOSCOPY  COLON BIOPSY. Nausea/Vomiting: None    Postoperative hydration reviewed and adequate. Pain:  Pain Scale 1: Numeric (0 - 10) (07/15/19 0831)  Pain Intensity 1: 0 (07/15/19 0831)   Managed    Neurological Status: At baseline    Mental Status, Level of Consciousness: Alert and  oriented to person, place, and time    Pulmonary Status:   O2 Device: Room air (07/15/19 0831)   Adequate oxygenation and airway patent    Complications related to anesthesia: None    Post-anesthesia assessment completed. No concerns    Signed By: Glenroy Poole MD     July 15, 2019              Procedure(s):  COLONOSCOPY  COLON BIOPSY. MAC    <BSHSIANPOST>    Vitals Value Taken Time   BP 0/0 7/15/2019  8:38 AM   Temp     Pulse 0 7/15/2019  8:38 AM   Resp 0 7/15/2019  8:46 AM   SpO2 96 % 7/15/2019  8:36 AM   Vitals shown include unvalidated device data.

## 2019-09-05 DIAGNOSIS — F51.02 ADJUSTMENT INSOMNIA: ICD-10-CM

## 2019-09-05 RX ORDER — SERTRALINE HYDROCHLORIDE 50 MG/1
TABLET, FILM COATED ORAL
Qty: 90 TAB | Refills: 0 | Status: SHIPPED | OUTPATIENT
Start: 2019-09-05 | End: 2020-02-08

## 2019-09-26 ENCOUNTER — HOSPITAL ENCOUNTER (OUTPATIENT)
Dept: LAB | Age: 69
Discharge: HOME | End: 2019-09-26
Payer: MEDICARE

## 2019-09-26 ENCOUNTER — OFFICE VISIT (OUTPATIENT)
Dept: INTERNAL MEDICINE CLINIC | Age: 69
End: 2019-09-26

## 2019-09-26 VITALS
RESPIRATION RATE: 16 BRPM | HEART RATE: 71 BPM | TEMPERATURE: 98.4 F | HEIGHT: 62 IN | DIASTOLIC BLOOD PRESSURE: 76 MMHG | OXYGEN SATURATION: 98 % | SYSTOLIC BLOOD PRESSURE: 132 MMHG | BODY MASS INDEX: 32.2 KG/M2 | WEIGHT: 175 LBS

## 2019-09-26 DIAGNOSIS — E11.65 TYPE 2 DIABETES MELLITUS WITH HYPERGLYCEMIA, WITHOUT LONG-TERM CURRENT USE OF INSULIN (HCC): Primary | ICD-10-CM

## 2019-09-26 DIAGNOSIS — I10 ESSENTIAL HYPERTENSION: ICD-10-CM

## 2019-09-26 DIAGNOSIS — M45.9 ANKYLOSING SPONDYLITIS, UNSPECIFIED SITE OF SPINE (HCC): ICD-10-CM

## 2019-09-26 DIAGNOSIS — Z98.890 HX OF COLONOSCOPY: ICD-10-CM

## 2019-09-26 DIAGNOSIS — R19.7 DIARRHEA, UNSPECIFIED TYPE: ICD-10-CM

## 2019-09-26 PROCEDURE — 83036 HEMOGLOBIN GLYCOSYLATED A1C: CPT

## 2019-09-26 PROCEDURE — 36415 COLL VENOUS BLD VENIPUNCTURE: CPT

## 2019-09-26 PROCEDURE — 80061 LIPID PANEL: CPT

## 2019-09-26 PROCEDURE — 80053 COMPREHEN METABOLIC PANEL: CPT

## 2019-09-26 RX ORDER — DICYCLOMINE HYDROCHLORIDE 10 MG/1
1 CAPSULE ORAL
Refills: 0 | COMMUNITY
Start: 2019-09-10 | End: 2021-08-03 | Stop reason: ALTCHOICE

## 2019-09-26 NOTE — PROGRESS NOTES
HISTORY OF PRESENT ILLNESS  Nohemy Mcdaniel is a 76 y.o. female. CHRISTIANO White is seen at follow up, generally doing better. Issues:  1. Diarrhea. Had a colonoscopy with Dr. Lisa Elmore in July. Biopsies negative for microscopic colitis. She is on Bentyl 10 mg b.i.d. and stools are much improved. 2. Diabetes. Last A1c was 7%. She is walking more, remains on Metformin and Januvia. 3. Ankylosing spondylitis with iritis. Has been off immunosuppressants now for months. Would like to try to stay off if possible. Will discuss with Dr. Constantine Kent. Plans on getting her flu shot in October. 4. Hypertension, stable. No headaches, no chest pain or SOB. Review of Systems   Constitutional: Negative for chills, fever and weight loss. Respiratory: Negative for cough, shortness of breath and wheezing. Cardiovascular: Negative for chest pain, palpitations, orthopnea, leg swelling and PND. Gastrointestinal: Negative for abdominal pain, diarrhea, heartburn, nausea and vomiting. Musculoskeletal: Negative for joint pain and myalgias. Neurological: Negative for dizziness and headaches. Physical Exam   Constitutional: She is oriented to person, place, and time. She appears well-developed and well-nourished. HENT:   Head: Normocephalic and atraumatic. Neck: Normal range of motion. Neck supple. Carotid bruit is not present. No thyromegaly present. Cardiovascular: Normal rate, regular rhythm, S1 normal, S2 normal, normal heart sounds and intact distal pulses. No murmur heard. Pulmonary/Chest: Effort normal and breath sounds normal. No respiratory distress. She has no wheezes. She has no rales. Musculoskeletal: She exhibits no edema. Neurological: She is alert and oriented to person, place, and time. Psychiatric: She has a normal mood and affect. Her behavior is normal.   Nursing note and vitals reviewed. ASSESSMENT and PLAN  Diagnoses and all orders for this visit:    1.  Type 2 diabetes mellitus with hyperglycemia, without long-term current use of insulin (HCC)  -     METABOLIC PANEL, COMPREHENSIVE  -     LIPID PANEL  -     HEMOGLOBIN A1C WITH EAG    2. Diarrhea, unspecified type-better on bentyl    3. Ankylosing spondylitis, unspecified site of spine (HCC)-off immunosuppressives now     4. Essential hypertension  Stable cont meds  5.  Hx of colonoscopy

## 2019-09-27 LAB
ALBUMIN SERPL-MCNC: 4.1 G/DL (ref 3.6–4.8)
ALBUMIN/GLOB SERPL: 1.8 {RATIO} (ref 1.2–2.2)
ALP SERPL-CCNC: 57 IU/L (ref 39–117)
ALT SERPL-CCNC: 17 IU/L (ref 0–32)
AST SERPL-CCNC: 14 IU/L (ref 0–40)
BILIRUB SERPL-MCNC: 0.4 MG/DL (ref 0–1.2)
BUN SERPL-MCNC: 19 MG/DL (ref 8–27)
BUN/CREAT SERPL: 22 (ref 12–28)
CALCIUM SERPL-MCNC: 9.2 MG/DL (ref 8.7–10.3)
CHLORIDE SERPL-SCNC: 100 MMOL/L (ref 96–106)
CHOLEST SERPL-MCNC: 155 MG/DL (ref 100–199)
CO2 SERPL-SCNC: 24 MMOL/L (ref 20–29)
CREAT SERPL-MCNC: 0.86 MG/DL (ref 0.57–1)
EST. AVERAGE GLUCOSE BLD GHB EST-MCNC: 157 MG/DL
GLOBULIN SER CALC-MCNC: 2.3 G/DL (ref 1.5–4.5)
GLUCOSE SERPL-MCNC: 151 MG/DL (ref 65–99)
HBA1C MFR BLD: 7.1 % (ref 4.8–5.6)
HDLC SERPL-MCNC: 35 MG/DL
INTERPRETATION, 910389: NORMAL
LDLC SERPL CALC-MCNC: 69 MG/DL (ref 0–99)
Lab: NORMAL
POTASSIUM SERPL-SCNC: 3.7 MMOL/L (ref 3.5–5.2)
PROT SERPL-MCNC: 6.4 G/DL (ref 6–8.5)
SODIUM SERPL-SCNC: 141 MMOL/L (ref 134–144)
TRIGL SERPL-MCNC: 257 MG/DL (ref 0–149)
VLDLC SERPL CALC-MCNC: 51 MG/DL (ref 5–40)

## 2019-10-06 RX ORDER — ATORVASTATIN CALCIUM 20 MG/1
TABLET, FILM COATED ORAL
Qty: 90 TAB | Refills: 0 | Status: SHIPPED | OUTPATIENT
Start: 2019-10-06 | End: 2019-12-31

## 2019-11-04 DIAGNOSIS — I10 ESSENTIAL HYPERTENSION WITH GOAL BLOOD PRESSURE LESS THAN 130/80: ICD-10-CM

## 2019-11-04 RX ORDER — OLMESARTAN MEDOXOMIL AND HYDROCHLOROTHIAZIDE 40/25 40; 25 MG/1; MG/1
TABLET ORAL
Qty: 90 TAB | Refills: 0 | Status: SHIPPED | OUTPATIENT
Start: 2019-11-04 | End: 2019-12-31

## 2019-11-05 ENCOUNTER — TELEPHONE (OUTPATIENT)
Dept: INTERNAL MEDICINE CLINIC | Age: 69
End: 2019-11-05

## 2019-11-05 NOTE — TELEPHONE ENCOUNTER
----- Message from Dana Marcos sent at 11/5/2019  1:24 PM EST -----  Regarding: Dr. Medel Sequin: [phone]  Caller's first and last name and relationship (if not the patient): N/A  Best contact number(s):  [phone]  What are the symptoms: Fever (101.0) , Laryngitis, Cough  Transfer successful - yes/no (include outcome): Attempted Transfer. Practice Unavailable. Transfer declined - yes/no (include reason): N/A  Was caller advised to seek appropriate level of care - yes/no: Yes  Details to clarify the request: Pt. stated she has an Auto-immune disorder, and that when she gets a fever of 101.0 or higher, she should contact the nurse. Pt scheduled for Acute visit with Dr. Tuan Espinoza on 11/06/19 .

## 2019-11-06 ENCOUNTER — OFFICE VISIT (OUTPATIENT)
Dept: INTERNAL MEDICINE CLINIC | Age: 69
End: 2019-11-06

## 2019-11-06 VITALS
TEMPERATURE: 98.8 F | DIASTOLIC BLOOD PRESSURE: 80 MMHG | WEIGHT: 171 LBS | OXYGEN SATURATION: 98 % | RESPIRATION RATE: 16 BRPM | HEIGHT: 62 IN | SYSTOLIC BLOOD PRESSURE: 114 MMHG | HEART RATE: 84 BPM | BODY MASS INDEX: 31.47 KG/M2

## 2019-11-06 DIAGNOSIS — R05.9 COUGH: Primary | ICD-10-CM

## 2019-11-06 DIAGNOSIS — J32.9 SINUSITIS, UNSPECIFIED CHRONICITY, UNSPECIFIED LOCATION: ICD-10-CM

## 2019-11-06 RX ORDER — BENZONATATE 200 MG/1
200 CAPSULE ORAL
Qty: 21 CAP | Refills: 0 | Status: SHIPPED | OUTPATIENT
Start: 2019-11-06 | End: 2019-11-13

## 2019-11-06 RX ORDER — AMOXICILLIN AND CLAVULANATE POTASSIUM 875; 125 MG/1; MG/1
1 TABLET, FILM COATED ORAL EVERY 12 HOURS
Qty: 20 TAB | Refills: 0 | Status: SHIPPED | OUTPATIENT
Start: 2019-11-06 | End: 2019-11-16

## 2019-11-06 NOTE — PATIENT INSTRUCTIONS
Sinusitis: Care Instructions Your Care Instructions Sinusitis is an infection of the lining of the sinus cavities in your head. Sinusitis often follows a cold. It causes pain and pressure in your head and face. In most cases, sinusitis gets better on its own in 1 to 2 weeks. But some mild symptoms may last for several weeks. Sometimes antibiotics are needed. Follow-up care is a key part of your treatment and safety. Be sure to make and go to all appointments, and call your doctor if you are having problems. It's also a good idea to know your test results and keep a list of the medicines you take. How can you care for yourself at home? · Take an over-the-counter pain medicine, such as acetaminophen (Tylenol), ibuprofen (Advil, Motrin), or naproxen (Aleve). Read and follow all instructions on the label. · If the doctor prescribed antibiotics, take them as directed. Do not stop taking them just because you feel better. You need to take the full course of antibiotics. · Be careful when taking over-the-counter cold or flu medicines and Tylenol at the same time. Many of these medicines have acetaminophen, which is Tylenol. Read the labels to make sure that you are not taking more than the recommended dose. Too much acetaminophen (Tylenol) can be harmful. · Breathe warm, moist air from a steamy shower, a hot bath, or a sink filled with hot water. Avoid cold, dry air. Using a humidifier in your home may help. Follow the directions for cleaning the machine. · Use saline (saltwater) nasal washes to help keep your nasal passages open and wash out mucus and bacteria. You can buy saline nose drops at a grocery store or drugstore. Or you can make your own at home by adding 1 teaspoon of salt and 1 teaspoon of baking soda to 2 cups of distilled water. If you make your own, fill a bulb syringe with the solution, insert the tip into your nostril, and squeeze gently. Liana Danielby your nose. · Put a hot, wet towel or a warm gel pack on your face 3 or 4 times a day for 5 to 10 minutes each time. · Try a decongestant nasal spray like oxymetazoline (Afrin). Do not use it for more than 3 days in a row. Using it for more than 3 days can make your congestion worse. When should you call for help? Call your doctor now or seek immediate medical care if: 
  · You have new or worse swelling or redness in your face or around your eyes.  
  · You have a new or higher fever.  
 Watch closely for changes in your health, and be sure to contact your doctor if: 
  · You have new or worse facial pain.  
  · The mucus from your nose becomes thicker (like pus) or has new blood in it.  
  · You are not getting better as expected. Where can you learn more? Go to http://zara-nelly.info/. Enter I876 in the search box to learn more about \"Sinusitis: Care Instructions. \" Current as of: October 21, 2018 Content Version: 12.2 © 7335-1458 [employer]. Care instructions adapted under license by [employer]  (which disclaims liability or warranty for this information). If you have questions about a medical condition or this instruction, always ask your healthcare professional. [address] any warranty or liability for your use of this information.

## 2019-11-06 NOTE — PROGRESS NOTES
Juanjose Chapman is a 76 y.o. female who presents today for Laryngitis; Cough; Fever; and Headache  . She has a history of   Patient Active Problem List   Diagnosis Code    S/P partial hysterectomy Z90.711    HTN (hypertension) I10    Hypercholesterolemia E78.00    Iritis H20.9    TMJ (dislocation of temporomandibular joint) S03. 00XA    Sacroiliitis ([address] M46.1    Essential hypertension, benign I10    First degree atrioventricular block I44.0    Ankylosing spondylitis (HCC) M45.9    Spinal stenosis, lumbar M48.061    Primary osteoarthritis of right knee M17.11    Obesity (BMI 30.0-34. 9) E66.9    Type 2 diabetes mellitus with hyperglycemia, without long-term current use of insulin (HCC) E11.65    Advanced care planning/counseling discussion Z71.89    Hx of colonoscopy Z98.890   . Today patient is here for an acute visit. Tameka Sadler Upper respiratory illness:  Juanjose Chapman presents with complaints of congestion, sore throat, productive cough, fever and hoarseness for 1 weeks. no nausea and no vomiting . she has not had  headache. Symptoms are moderate. Over-the-counter remedies including allegra and cough drops. Drinking plenty of fluids: yes  Asthma?:  yes  non-smoker  Contacts with similar infections: ye,  with upper respiratory infection and she recently visited her grandchildren. She is off her immunomodulators since earlier this year. ROS  Review of Systems   Constitutional: Positive for fever and malaise/fatigue. Negative for chills and weight loss. HENT: Positive for congestion and sore throat. Negative for ear discharge, nosebleeds and sinus pain. Eyes: Negative for blurred vision, double vision and photophobia. Respiratory: Positive for cough and sputum production. Negative for hemoptysis, shortness of breath, wheezing and stridor. Cardiovascular: Negative for chest pain, palpitations and leg swelling.    Gastrointestinal: Negative for abdominal pain, constipation, diarrhea, heartburn, nausea and vomiting. Genitourinary: Negative for dysuria, frequency and urgency. Musculoskeletal: Negative for joint pain and myalgias. Skin: Negative for rash. Neurological: Negative. Negative for headaches. Endo/Heme/Allergies: Does not bruise/bleed easily. Psychiatric/Behavioral: Negative for memory loss and suicidal ideas. Visit Vitals  /80 (BP 1 Location: Left arm, BP Patient Position: Sitting)   Pulse 84   Temp 98.8 °F (37.1 °C) (Oral)   Resp 16   Ht 5' 2\" (1.575 m)   Wt 171 lb (77.6 kg)   SpO2 98%   BMI 31.28 kg/m²       Physical Exam   Constitutional: She is oriented to person, place, and time. She appears well-developed and well-nourished. HENT:   Head: Normocephalic and atraumatic. Right Ear: External ear normal.   Left Ear: External ear normal.   Mouth/Throat: Oropharynx is clear and moist.   Fluid behind both TM. Eyes: Pupils are equal, round, and reactive to light. EOM are normal.   Neck: Normal range of motion. Neck supple. No thyromegaly present. Cardiovascular: Normal rate and regular rhythm. No murmur heard. Pulmonary/Chest: Effort normal and breath sounds normal. No stridor. No respiratory distress. She has no wheezes. No wheezing, no egophony   Abdominal: Soft. Bowel sounds are normal. She exhibits no distension and no mass. There is no tenderness. There is no guarding. Musculoskeletal: Normal range of motion. She exhibits no edema. Neurological: She is alert and oriented to person, place, and time. Current Outpatient Medications   Medication Sig    guaiFENesin (MUCINEX) 1,200 mg Ta12 ER tablet Take 1,200 mg by mouth two (2) times a day.  amoxicillin-clavulanate (AUGMENTIN) 875-125 mg per tablet Take 1 Tab by mouth every twelve (12) hours for 10 days.  benzonatate (TESSALON) 200 mg capsule Take 1 Cap by mouth three (3) times daily as needed for Cough for up to 7 days.     olmesartan-hydroCHLOROthiazide (BENICAR HCT) 40-25 mg per tablet TAKE 1 TABLET BY MOUTH DAILY    atorvastatin (LIPITOR) 20 mg tablet TAKE 1 TABLET BY MOUTH EVERY EVENING    dicyclomine (BENTYL) 10 mg capsule Take 1 Cap by mouth Before breakfast, lunch, and dinner.  sertraline (ZOLOFT) 50 mg tablet TAKE 1 TABLET BY MOUTH DAILY    atorvastatin (LIPITOR) 20 mg tablet Take 10 mg by mouth nightly.  fexofenadine (ALLEGRA) 180 mg tablet Take 180 mg by mouth daily.  fluticasone (FLONASE SENSIMIST) 27.5 mcg/actuation nasal spray 2 Sprays by Both Nostrils route daily.  metFORMIN ER (GLUCOPHAGE XR) 500 mg tablet 2 po every day with dinner    SITagliptin (JANUVIA) 100 mg tablet TAKE 1 TABLET BY MOUTH DAILY    montelukast (SINGULAIR) 10 mg tablet TAKE 1 TABLET BY MOUTH EVERY DAY    Blood-Glucose Meter (FREESTYLE LITE METER) monitoring kit Use to test blood sugar once a day. Dx: E11.65    glucose blood VI test strips (FREESTYLE LITE STRIPS) strip Use to test blood sugar once daily. Dx: E11.65    Lancets misc Use to test blood sugar once daily. Dx: E11.65    acetaminophen (TYLENOL) 325 mg tablet Take 325 mg by mouth as needed for Pain.  melatonin 5 mg cap capsule Take 5 mg by mouth nightly.  raNITIdine (ZANTAC) 150 mg tablet Take 1 Tab by mouth two (2) times a day.  albuterol (PROVENTIL HFA, VENTOLIN HFA, PROAIR HFA) 90 mcg/actuation inhaler Take 1 Puff by inhalation every six (6) hours as needed.  HYDROcodone-acetaminophen (NORCO) 5-325 mg per tablet Take 1 Tab by mouth every eight (8) hours as needed for Pain. No current facility-administered medications for this visit.          Past Medical History:   Diagnosis Date    Ankylosing spondylitis ([facility] 2/8/2012    Asthma     Autoimmune disease ([facility]     ankylosing spondylitis    Chronic pain     spondylitis    Diabetes ([facility]     Essential hypertension, benign     First degree atrioventricular block     GERD (gastroesophageal reflux disease)     Hx of colonoscopy 9/26/2019 7/19  soniaroarty neg biopsy for colitis    Hypercholesteremia     Ill-defined condition     has a rib cage thus does not always breathe deep    Ill-defined condition     diarrhea x 5 months    Iritis 1/15/2009    PUD (peptic ulcer disease)     HX ULCERS 1970'S    S/P partial hysterectomy     Sacroiliitis ([address] 9/15/2010    Seizures ([address] CHILD    HX SEIZURES CHILD (NOT CURRENT PROBLEM)    Spinal stenosis, lumbar 5/29/14    mri tamiko at l4-l5    TMJ (dislocation of temporomandibular joint) 9/4/2009    Unspecified adverse effect of anesthesia     woke up durning  cataract and hysterectomy       Past Surgical History:   Procedure Laterality Date    COLONOSCOPY N/A 7/15/2019    COLONOSCOPY performed by Malina Hand MD at [facility]. [address], COLON, DIAGNOSTIC  1/01/07    negative     HX BREAST BIOPSY Right 1990's    benign    HX CATARACT REMOVAL  1992    bilateral  / lens implant    HX GYN  9/28/13    bilat oophorectomy    HX HYSTERECTOMY  2008    HX KNEE ARTHROSCOPY  2003    right    HX ORTHOPAEDIC  8/6/15    LUMBAR LAMINECTOMY L3-4 dr Sandra Andrade    HX ORTHOPAEDIC Right 1976    menicusectomy    HX TONSILLECTOMY  1958      Social History     Tobacco Use    Smoking status: Never Smoker    Smokeless tobacco: Never Used   Substance Use Topics    Alcohol use: Not Currently      Family History   Problem Relation Age of Onset    Diabetes Mother     Heart Disease Mother     Hypertension Father     Cancer Father         PAROTID, LUNG METS    Heart Disease Brother     Pneumonia Brother     Other Brother         autoimmune disease    Anesth Problems Neg Hx         Allergies   Allergen Reactions    Latex Swelling     SWELLING TONGUE, ORAL CAVITY    Benadryl [Diphenhydramine Hcl] Other (comments)     Raises heart rate    Atropine Swelling     Eye swelling    Chocolate [Cocoa] Rash    Compazine [Prochlorperazine Edisylate] Other (comments)     Muscle spasm, POSSIBLE SEIZURES    Cymbalta [Duloxetine] Vertigo    Diclofenac Sodium (Bulk) Rash    Milk Rash    Phenobarbital Other (comments)     tachy        Assessment/Plan  Diagnoses and all orders for this visit:    1. Cough -symptoms likely due to bacterial sinus infection. -     amoxicillin-clavulanate (AUGMENTIN) 875-125 mg per tablet; Take 1 Tab by mouth every twelve (12) hours for 10 days. -     benzonatate (TESSALON) 200 mg capsule; Take 1 Cap by mouth three (3) times daily as needed for Cough for up to 7 days. 2. Sinusitis, unspecified chronicity, unspecified location  -     amoxicillin-clavulanate (AUGMENTIN) 875-125 mg per tablet; Take 1 Tab by mouth every twelve (12) hours for 10 days. -     benzonatate (TESSALON) 200 mg capsule; Take 1 Cap by mouth three (3) times daily as needed for Cough for up to 7 days. Follow-up and Dispositions    · Return if symptoms worsen or fail to improve. Smitha Carrasco MD  11/6/2019    This note was created with the help of speech recognition software [employer]) and may contain some 'sound alike' errors.

## 2019-11-22 ENCOUNTER — PATIENT OUTREACH (OUTPATIENT)
Dept: INTERNAL MEDICINE CLINIC | Age: 69
End: 2019-11-22

## 2019-12-31 DIAGNOSIS — I10 ESSENTIAL HYPERTENSION WITH GOAL BLOOD PRESSURE LESS THAN 130/80: ICD-10-CM

## 2019-12-31 DIAGNOSIS — J06.9 RECURRENT URI (UPPER RESPIRATORY INFECTION): ICD-10-CM

## 2019-12-31 RX ORDER — OLMESARTAN MEDOXOMIL AND HYDROCHLOROTHIAZIDE 40/25 40; 25 MG/1; MG/1
TABLET ORAL
Qty: 90 TAB | Refills: 0 | Status: SHIPPED | OUTPATIENT
Start: 2019-12-31 | End: 2020-04-03

## 2019-12-31 RX ORDER — ATORVASTATIN CALCIUM 20 MG/1
TABLET, FILM COATED ORAL
Qty: 90 TAB | Refills: 0 | Status: SHIPPED | OUTPATIENT
Start: 2019-12-31 | End: 2020-04-07 | Stop reason: SDUPTHER

## 2019-12-31 RX ORDER — MONTELUKAST SODIUM 10 MG/1
TABLET ORAL
Qty: 90 TAB | Refills: 11 | Status: SHIPPED | OUTPATIENT
Start: 2019-12-31 | End: 2021-01-14

## 2020-01-13 DIAGNOSIS — E11.65 TYPE 2 DIABETES MELLITUS WITH HYPERGLYCEMIA, WITHOUT LONG-TERM CURRENT USE OF INSULIN (HCC): ICD-10-CM

## 2020-01-13 RX ORDER — METFORMIN HYDROCHLORIDE 500 MG/1
TABLET, EXTENDED RELEASE ORAL
Qty: 180 TAB | Refills: 1 | Status: SHIPPED | OUTPATIENT
Start: 2020-01-13 | End: 2020-07-19

## 2020-02-07 DIAGNOSIS — F51.02 ADJUSTMENT INSOMNIA: ICD-10-CM

## 2020-02-08 RX ORDER — SERTRALINE HYDROCHLORIDE 50 MG/1
TABLET, FILM COATED ORAL
Qty: 90 TAB | Refills: 0 | Status: SHIPPED | OUTPATIENT
Start: 2020-02-08 | End: 2020-04-07 | Stop reason: SDUPTHER

## 2020-03-17 ENCOUNTER — TELEPHONE (OUTPATIENT)
Dept: INTERNAL MEDICINE CLINIC | Age: 70
End: 2020-03-17

## 2020-03-17 NOTE — TELEPHONE ENCOUNTER
Advised patient to stay in as much as possible for the next 6-8 weeks & wash her hands as frequent as possible & avoid touching her face. Patient voiced understanding.

## 2020-03-17 NOTE — TELEPHONE ENCOUNTER
Patient called to report that she is isolating herself due to corona virus. Patent is requesting nurse/PCP please advise of things to help her during this time due to age, diabetes, & autoimmune disease.        [phone]

## 2020-04-03 DIAGNOSIS — I10 ESSENTIAL HYPERTENSION WITH GOAL BLOOD PRESSURE LESS THAN 130/80: ICD-10-CM

## 2020-04-03 RX ORDER — OLMESARTAN MEDOXOMIL AND HYDROCHLOROTHIAZIDE 40/25 40; 25 MG/1; MG/1
TABLET ORAL
Qty: 90 TAB | Refills: 0 | Status: SHIPPED | OUTPATIENT
Start: 2020-04-03 | End: 2020-04-09

## 2020-04-03 NOTE — TELEPHONE ENCOUNTER
Reached out to patient to assist w/ scheduling a medication evaluation / follow up appointment via Virtual Visit.  Patient has scheduled a TeleMed My Chart Visit for 04/07 @ 2:00 - thank you

## 2020-04-07 ENCOUNTER — VIRTUAL VISIT (OUTPATIENT)
Dept: INTERNAL MEDICINE CLINIC | Age: 70
End: 2020-04-07

## 2020-04-07 VITALS — WEIGHT: 167 LBS | TEMPERATURE: 98.1 F | BODY MASS INDEX: 30.54 KG/M2

## 2020-04-07 DIAGNOSIS — I10 ESSENTIAL HYPERTENSION: ICD-10-CM

## 2020-04-07 DIAGNOSIS — F51.02 ADJUSTMENT INSOMNIA: ICD-10-CM

## 2020-04-07 DIAGNOSIS — E11.65 TYPE 2 DIABETES MELLITUS WITH HYPERGLYCEMIA, WITHOUT LONG-TERM CURRENT USE OF INSULIN (HCC): Primary | ICD-10-CM

## 2020-04-07 DIAGNOSIS — M46.1 SACROILIITIS (HCC): ICD-10-CM

## 2020-04-07 DIAGNOSIS — E11.65 TYPE 2 DIABETES MELLITUS WITH HYPERGLYCEMIA, WITHOUT LONG-TERM CURRENT USE OF INSULIN (HCC): ICD-10-CM

## 2020-04-07 DIAGNOSIS — E78.5 DYSLIPIDEMIA, GOAL LDL BELOW 100: ICD-10-CM

## 2020-04-07 RX ORDER — SERTRALINE HYDROCHLORIDE 50 MG/1
TABLET, FILM COATED ORAL
Qty: 90 TAB | Refills: 1 | Status: SHIPPED | OUTPATIENT
Start: 2020-04-07 | End: 2020-07-19

## 2020-04-07 RX ORDER — ATORVASTATIN CALCIUM 20 MG/1
10 TABLET, FILM COATED ORAL DAILY
Qty: 30 TAB | Refills: 0
Start: 2020-04-07 | End: 2021-01-11 | Stop reason: SDUPTHER

## 2020-04-07 NOTE — PROGRESS NOTES
Phone visit-she feels well and stable on meds-mood is good on zoloft and wants to cont current dose. No sxs of covid currently but tells me that if develops disease does not  Want ventilator support as does not think she would be able to come off machine and does  Not want her family to have to make these decisions. I encouraged her to share her wishes with family as well as me. Due for labs when safe to go  Out and will send orders.   Encouraged her to check bp at home and to cont current meds  Time on phone 15 minutes

## 2020-04-08 DIAGNOSIS — I10 ESSENTIAL HYPERTENSION WITH GOAL BLOOD PRESSURE LESS THAN 130/80: ICD-10-CM

## 2020-04-09 RX ORDER — OLMESARTAN MEDOXOMIL AND HYDROCHLOROTHIAZIDE 40/25 40; 25 MG/1; MG/1
TABLET ORAL
Qty: 90 TAB | Refills: 0 | Status: SHIPPED | OUTPATIENT
Start: 2020-04-09 | End: 2020-10-16

## 2020-04-27 DIAGNOSIS — E11.65 TYPE 2 DIABETES MELLITUS WITH HYPERGLYCEMIA, WITHOUT LONG-TERM CURRENT USE OF INSULIN (HCC): ICD-10-CM

## 2020-07-18 DIAGNOSIS — E11.65 TYPE 2 DIABETES MELLITUS WITH HYPERGLYCEMIA, WITHOUT LONG-TERM CURRENT USE OF INSULIN (HCC): ICD-10-CM

## 2020-07-18 DIAGNOSIS — F51.02 ADJUSTMENT INSOMNIA: ICD-10-CM

## 2020-07-19 RX ORDER — METFORMIN HYDROCHLORIDE 500 MG/1
TABLET, EXTENDED RELEASE ORAL
Qty: 180 TAB | Refills: 1 | Status: SHIPPED | OUTPATIENT
Start: 2020-07-19 | End: 2021-01-14

## 2020-07-19 RX ORDER — SERTRALINE HYDROCHLORIDE 50 MG/1
TABLET, FILM COATED ORAL
Qty: 90 TAB | Refills: 1 | Status: SHIPPED | OUTPATIENT
Start: 2020-07-19 | End: 2020-11-13

## 2020-10-16 DIAGNOSIS — I10 ESSENTIAL HYPERTENSION WITH GOAL BLOOD PRESSURE LESS THAN 130/80: ICD-10-CM

## 2020-10-16 DIAGNOSIS — E11.65 TYPE 2 DIABETES MELLITUS WITH HYPERGLYCEMIA, WITHOUT LONG-TERM CURRENT USE OF INSULIN (HCC): ICD-10-CM

## 2020-10-16 RX ORDER — OLMESARTAN MEDOXOMIL AND HYDROCHLOROTHIAZIDE 40/25 40; 25 MG/1; MG/1
TABLET ORAL
Qty: 90 TAB | Refills: 0 | Status: SHIPPED | OUTPATIENT
Start: 2020-10-16 | End: 2021-01-14

## 2020-10-20 ENCOUNTER — HOSPITAL ENCOUNTER (OUTPATIENT)
Dept: LAB | Age: 70
Discharge: HOME | End: 2020-10-20
Payer: MEDICARE

## 2020-10-20 PROCEDURE — 80053 COMPREHEN METABOLIC PANEL: CPT

## 2020-10-20 PROCEDURE — 80061 LIPID PANEL: CPT

## 2020-10-20 PROCEDURE — 82043 UR ALBUMIN QUANTITATIVE: CPT

## 2020-10-20 PROCEDURE — 36415 COLL VENOUS BLD VENIPUNCTURE: CPT

## 2020-10-20 PROCEDURE — 83036 HEMOGLOBIN GLYCOSYLATED A1C: CPT

## 2020-10-21 LAB
ALBUMIN SERPL-MCNC: 4.5 G/DL (ref 3.8–4.8)
ALBUMIN/CREAT UR: 9 MG/G CREAT (ref 0–29)
ALBUMIN/GLOB SERPL: 2 {RATIO} (ref 1.2–2.2)
ALP SERPL-CCNC: 58 IU/L (ref 39–117)
ALT SERPL-CCNC: 19 IU/L (ref 0–32)
AST SERPL-CCNC: 15 IU/L (ref 0–40)
BILIRUB SERPL-MCNC: 0.5 MG/DL (ref 0–1.2)
BUN SERPL-MCNC: 21 MG/DL (ref 8–27)
BUN/CREAT SERPL: 20 (ref 12–28)
CALCIUM SERPL-MCNC: 9.8 MG/DL (ref 8.7–10.3)
CHLORIDE SERPL-SCNC: 98 MMOL/L (ref 96–106)
CHOLEST SERPL-MCNC: 169 MG/DL (ref 100–199)
CO2 SERPL-SCNC: 27 MMOL/L (ref 20–29)
CREAT SERPL-MCNC: 1.06 MG/DL (ref 0.57–1)
CREAT UR-MCNC: 303.6 MG/DL
EST. AVERAGE GLUCOSE BLD GHB EST-MCNC: 163 MG/DL
GLOBULIN SER CALC-MCNC: 2.3 G/DL (ref 1.5–4.5)
GLUCOSE SERPL-MCNC: 187 MG/DL (ref 65–99)
HBA1C MFR BLD: 7.3 % (ref 4.8–5.6)
HDLC SERPL-MCNC: 36 MG/DL
INTERPRETATION, 910389: NORMAL
INTERPRETATION: NORMAL
LDLC SERPL CALC-MCNC: 81 MG/DL (ref 0–99)
Lab: NORMAL
MICROALBUMIN UR-MCNC: 28.3 UG/ML
PDF IMAGE, 910387: NORMAL
POTASSIUM SERPL-SCNC: 3.6 MMOL/L (ref 3.5–5.2)
PROT SERPL-MCNC: 6.8 G/DL (ref 6–8.5)
SODIUM SERPL-SCNC: 139 MMOL/L (ref 134–144)
TRIGL SERPL-MCNC: 316 MG/DL (ref 0–149)
VLDLC SERPL CALC-MCNC: 52 MG/DL (ref 5–40)

## 2020-10-26 ENCOUNTER — OFFICE VISIT (OUTPATIENT)
Dept: INTERNAL MEDICINE CLINIC | Age: 70
End: 2020-10-26
Payer: MEDICARE

## 2020-10-26 VITALS
RESPIRATION RATE: 16 BRPM | WEIGHT: 170 LBS | HEART RATE: 80 BPM | HEIGHT: 62 IN | DIASTOLIC BLOOD PRESSURE: 84 MMHG | OXYGEN SATURATION: 97 % | SYSTOLIC BLOOD PRESSURE: 140 MMHG | BODY MASS INDEX: 31.28 KG/M2

## 2020-10-26 DIAGNOSIS — I10 ESSENTIAL HYPERTENSION WITH GOAL BLOOD PRESSURE LESS THAN 130/80: ICD-10-CM

## 2020-10-26 DIAGNOSIS — E11.65 TYPE 2 DIABETES MELLITUS WITH HYPERGLYCEMIA, WITHOUT LONG-TERM CURRENT USE OF INSULIN (HCC): Primary | ICD-10-CM

## 2020-10-26 DIAGNOSIS — E78.5 DYSLIPIDEMIA, GOAL LDL BELOW 100: ICD-10-CM

## 2020-10-26 DIAGNOSIS — J32.1 CHRONIC FRONTAL SINUSITIS: ICD-10-CM

## 2020-10-26 DIAGNOSIS — M46.1 SACROILIITIS (HCC): ICD-10-CM

## 2020-10-26 DIAGNOSIS — M45.9 ANKYLOSING SPONDYLITIS, UNSPECIFIED SITE OF SPINE (HCC): ICD-10-CM

## 2020-10-26 PROCEDURE — G8536 NO DOC ELDER MAL SCRN: HCPCS | Performed by: INTERNAL MEDICINE

## 2020-10-26 PROCEDURE — G8754 DIAS BP LESS 90: HCPCS | Performed by: INTERNAL MEDICINE

## 2020-10-26 PROCEDURE — G8432 DEP SCR NOT DOC, RNG: HCPCS | Performed by: INTERNAL MEDICINE

## 2020-10-26 PROCEDURE — G8427 DOCREV CUR MEDS BY ELIG CLIN: HCPCS | Performed by: INTERNAL MEDICINE

## 2020-10-26 PROCEDURE — G9899 SCRN MAM PERF RSLTS DOC: HCPCS | Performed by: INTERNAL MEDICINE

## 2020-10-26 PROCEDURE — G8417 CALC BMI ABV UP PARAM F/U: HCPCS | Performed by: INTERNAL MEDICINE

## 2020-10-26 PROCEDURE — G0463 HOSPITAL OUTPT CLINIC VISIT: HCPCS | Performed by: INTERNAL MEDICINE

## 2020-10-26 PROCEDURE — G8753 SYS BP > OR = 140: HCPCS | Performed by: INTERNAL MEDICINE

## 2020-10-26 PROCEDURE — 1090F PRES/ABSN URINE INCON ASSESS: CPT | Performed by: INTERNAL MEDICINE

## 2020-10-26 PROCEDURE — 1101F PT FALLS ASSESS-DOCD LE1/YR: CPT | Performed by: INTERNAL MEDICINE

## 2020-10-26 PROCEDURE — 2022F DILAT RTA XM EVC RTNOPTHY: CPT | Performed by: INTERNAL MEDICINE

## 2020-10-26 PROCEDURE — G8399 PT W/DXA RESULTS DOCUMENT: HCPCS | Performed by: INTERNAL MEDICINE

## 2020-10-26 PROCEDURE — 99214 OFFICE O/P EST MOD 30 MIN: CPT | Performed by: INTERNAL MEDICINE

## 2020-10-26 PROCEDURE — 3017F COLORECTAL CA SCREEN DOC REV: CPT | Performed by: INTERNAL MEDICINE

## 2020-10-26 RX ORDER — AMOXICILLIN AND CLAVULANATE POTASSIUM 875; 125 MG/1; MG/1
1 TABLET, FILM COATED ORAL EVERY 12 HOURS
Qty: 14 TAB | Refills: 0 | Status: SHIPPED | OUTPATIENT
Start: 2020-10-26 | End: 2021-04-29 | Stop reason: ALTCHOICE

## 2020-10-26 RX ORDER — PREDNISOLONE ACETATE 10 MG/ML
SUSPENSION/ DROPS OPHTHALMIC
COMMUNITY
Start: 2020-10-23 | End: 2021-11-16 | Stop reason: ALTCHOICE

## 2020-10-26 NOTE — PROGRESS NOTES
HISTORY OF PRESENT ILLNESS  Tong Kim is a 71 y.o. female. HPI  Santiago Echavarria notes since the pandemic she has been eating a very noncompliant diet. She is surprised that her A1c is 7.3. On Metformin and Januvia. Encouraged to work on dietary modification. Dyslipidemia, on Lipitor 10 mg daily, half of a 20. Total cholesterol 169, LDL 81, triglycerides 300. Again, discussed dietary modification. Continue the statin. Recurrent sinusitis and seasonal allergies, using Allegra and Flonase Sensimist.  Describes congestion and some bloody and greenish nasal discharge. No fevers or chills or shortness of breath. Will add Augmentin. Ankylosing spondylitis with recent uveitis. Has seen Dr. Jenae Taylor. Now on Pred Forte. Due for rheumatology follow up. Using Tylenol and Aspercreme. Review of Systems   Constitutional: Positive for malaise/fatigue. Negative for chills, fever and weight loss. HENT: Positive for congestion and sinus pain. Eyes: Positive for pain. Respiratory: Negative for cough, shortness of breath and wheezing. Cardiovascular: Negative for chest pain, palpitations, orthopnea, leg swelling and PND. Gastrointestinal: Negative for heartburn and nausea. Musculoskeletal: Positive for joint pain. Negative for myalgias. Neurological: Negative for dizziness and headaches. Endo/Heme/Allergies: Positive for environmental allergies. Physical Exam  Vitals signs and nursing note reviewed. Constitutional:       Appearance: She is well-developed. HENT:      Head: Normocephalic and atraumatic. Right Ear: Tympanic membrane and ear canal normal.      Left Ear: Tympanic membrane and ear canal normal.   Eyes:      Extraocular Movements: Extraocular movements intact. Conjunctiva/sclera: Conjunctivae normal.   Neck:      Musculoskeletal: Normal range of motion and neck supple. Thyroid: No thyromegaly. Vascular: No carotid bruit.    Cardiovascular:      Rate and Rhythm: Normal rate and regular rhythm. Heart sounds: Normal heart sounds, S1 normal and S2 normal. No murmur. Pulmonary:      Effort: Pulmonary effort is normal. No respiratory distress. Breath sounds: Normal breath sounds. No wheezing or rales. Musculoskeletal:      Right lower leg: No edema. Left lower leg: No edema. Lymphadenopathy:      Cervical: No cervical adenopathy. Neurological:      Mental Status: She is alert and oriented to person, place, and time. Psychiatric:         Behavior: Behavior normal.         ASSESSMENT and PLAN  Diagnoses and all orders for this visit:    1. Type 2 diabetes mellitus with hyperglycemia, without long-term current use of insulin ([facility]    2. Essential hypertension with goal blood pressure less than 130/80    3. Dyslipidemia, goal LDL below 100    4. Sacroiliitis ([address]    5. Chronic frontal sinusitis  -     amoxicillin-clavulanate (AUGMENTIN) 875-125 mg per tablet; Take 1 Tab by mouth every twelve (12) hours.     6. Ankylosing spondylitis, unspecified site of spine ([facility] [address] [facility]    Needs to see rheuamtology and cont with dr Rajwinder Benitez for uveitis  appt ini 6 mo with  Me  Work on lifestyle modification

## 2020-11-13 DIAGNOSIS — F51.02 ADJUSTMENT INSOMNIA: ICD-10-CM

## 2020-11-13 RX ORDER — SERTRALINE HYDROCHLORIDE 50 MG/1
TABLET, FILM COATED ORAL
Qty: 90 TAB | Refills: 1 | Status: SHIPPED | OUTPATIENT
Start: 2020-11-13 | End: 2021-07-13

## 2020-11-17 NOTE — DISCHARGE SUMMARY
@[email or web address]@ [address] [place] [id number]    DISCHARGE SUMMARY     Patient: Anais [facility] Record Number: [id number]                : 1950  Age: 77 y.o. Admit Date: 2017  Discharge Date: 2017  Admission Diagnosis: OA RIGHT KNEE  Primary osteoarthritis of right knee  Discharge Diagnosis: OA RIGHT KNEE  Procedures: Procedure(s):  RIGHT TOTAL KNEE ARTHROPLASTY   Surgeon: Grisel Cheney MD  Assistants: Janeth Trujillo SA  Anesthesia: spinal  Complications: None     History of Present Illness:  Keisha Lares is a 77 y.o. female with a history of Right knee pain, swelling, and marked loss of function. Despite conservative management and after clinical and radiographic evaluation, it was determined that she suffered from end-stage osteoarthritis and would benefit from Procedure(s):  RIGHT TOTAL KNEE ARTHROPLASTY , which she consented to undergo after a discussion of the risks, benefits, alternatives, rehab concerns, and potential complications of surgery. Hospital Course:  Keisha Lares tolerated the procedure well. She was transferred  to the recovery room in stable condition. After a brief stay the patient was then transferred to the Joint Replacement Unit at [address] [place] [address].  On postoperative day #1, the dressing was clean and dry, she was neurovascularly intact. The patient was afebrile and vital signs were stable. Calves were soft and non-tender bilaterally. On postoperative day  # 2, the patient was tolerating a regular diet and making satisfactory progress with physical therapy. Hemoglobin and INR prior to discharge were   Lab Results   Component Value Date/Time    HGB 10.9 2017 03:10 AM    INR 1.0 2017 09:01 AM   .  Keisha Lares made satisfactory progress with physical therapy and was discharged to Home in stable condition on postoperative day 2.   She was provided with routine postoperative instructions and advised to follow up in my office in 3 weeks following discharge from the hospital.  She was prescribed Aspirin for DVT prophylaxis and oxycodone for post-operative pain. Discharge Medications:  Cannot display discharge medications since this patient is not currently admitted.       Signed by: Hermila Willingham MD  4/26/2017 residents and PA

## 2021-01-11 DIAGNOSIS — E78.5 DYSLIPIDEMIA, GOAL LDL BELOW 100: ICD-10-CM

## 2021-01-11 RX ORDER — ATORVASTATIN CALCIUM 20 MG/1
10 TABLET, FILM COATED ORAL DAILY
Qty: 90 TAB | Refills: 1 | Status: SHIPPED | OUTPATIENT
Start: 2021-01-11 | End: 2022-01-14

## 2021-01-11 RX ORDER — ATORVASTATIN CALCIUM 20 MG/1
10 TABLET, FILM COATED ORAL DAILY
Qty: 90 TAB | Refills: 1 | Status: SHIPPED | OUTPATIENT
Start: 2021-01-11 | End: 2021-01-11 | Stop reason: SDUPTHER

## 2021-01-14 DIAGNOSIS — J06.9 RECURRENT URI (UPPER RESPIRATORY INFECTION): ICD-10-CM

## 2021-01-14 DIAGNOSIS — I10 ESSENTIAL HYPERTENSION WITH GOAL BLOOD PRESSURE LESS THAN 130/80: ICD-10-CM

## 2021-01-14 DIAGNOSIS — E11.65 TYPE 2 DIABETES MELLITUS WITH HYPERGLYCEMIA, WITHOUT LONG-TERM CURRENT USE OF INSULIN (HCC): ICD-10-CM

## 2021-01-14 RX ORDER — MONTELUKAST SODIUM 10 MG/1
TABLET ORAL
Qty: 90 TAB | Refills: 11 | Status: SHIPPED | OUTPATIENT
Start: 2021-01-14 | End: 2022-01-14

## 2021-01-14 RX ORDER — METFORMIN HYDROCHLORIDE 500 MG/1
TABLET, EXTENDED RELEASE ORAL
Qty: 180 TAB | Refills: 1 | Status: SHIPPED | OUTPATIENT
Start: 2021-01-14 | End: 2021-07-13

## 2021-01-14 RX ORDER — OLMESARTAN MEDOXOMIL AND HYDROCHLOROTHIAZIDE 40/25 40; 25 MG/1; MG/1
TABLET ORAL
Qty: 90 TAB | Refills: 0 | Status: SHIPPED | OUTPATIENT
Start: 2021-01-14 | End: 2021-04-14

## 2021-04-14 DIAGNOSIS — I10 ESSENTIAL HYPERTENSION WITH GOAL BLOOD PRESSURE LESS THAN 130/80: ICD-10-CM

## 2021-04-14 DIAGNOSIS — E11.65 TYPE 2 DIABETES MELLITUS WITH HYPERGLYCEMIA, WITHOUT LONG-TERM CURRENT USE OF INSULIN (HCC): ICD-10-CM

## 2021-04-14 RX ORDER — OLMESARTAN MEDOXOMIL AND HYDROCHLOROTHIAZIDE 40/25 40; 25 MG/1; MG/1
TABLET ORAL
Qty: 90 TAB | Refills: 0 | Status: SHIPPED | OUTPATIENT
Start: 2021-04-14 | End: 2021-07-13

## 2021-04-14 NOTE — TELEPHONE ENCOUNTER
Ayan Beverly MRN: [id number]    Date: 5/11/2021 Status: [facility]    Time: 10:15 AM Length: 15 [id number]   Visit Type: ROUTINE CARE [[id number]] Copay: $0.00    Provider: Britt Beasley MD Department: [facility]    Referral #:   Referral Status:      Referring Provider:   Patient Type:      Notes: 6 Month F/U w/ fasting labs

## 2021-04-28 ENCOUNTER — PATIENT MESSAGE (OUTPATIENT)
Dept: INTERNAL MEDICINE CLINIC | Age: 71
End: 2021-04-28

## 2021-04-29 ENCOUNTER — OFFICE VISIT (OUTPATIENT)
Dept: INTERNAL MEDICINE CLINIC | Age: 71
End: 2021-04-29
Payer: MEDICARE

## 2021-04-29 VITALS
RESPIRATION RATE: 16 BRPM | HEIGHT: 62 IN | TEMPERATURE: 98.5 F | WEIGHT: 166.8 LBS | HEART RATE: 93 BPM | SYSTOLIC BLOOD PRESSURE: 160 MMHG | BODY MASS INDEX: 30.69 KG/M2 | DIASTOLIC BLOOD PRESSURE: 100 MMHG | OXYGEN SATURATION: 97 %

## 2021-04-29 DIAGNOSIS — E11.65 TYPE 2 DIABETES MELLITUS WITH HYPERGLYCEMIA, WITHOUT LONG-TERM CURRENT USE OF INSULIN (HCC): ICD-10-CM

## 2021-04-29 DIAGNOSIS — M45.9 ANKYLOSING SPONDYLITIS, UNSPECIFIED SITE OF SPINE (HCC): ICD-10-CM

## 2021-04-29 DIAGNOSIS — M46.1 SACROILIITIS (HCC): ICD-10-CM

## 2021-04-29 DIAGNOSIS — D84.89 PRIMARY IMMUNE DEFICIENCY DISORDER (HCC): ICD-10-CM

## 2021-04-29 DIAGNOSIS — I10 ESSENTIAL HYPERTENSION WITH GOAL BLOOD PRESSURE LESS THAN 130/80: Primary | ICD-10-CM

## 2021-04-29 PROCEDURE — G8755 DIAS BP > OR = 90: HCPCS | Performed by: INTERNAL MEDICINE

## 2021-04-29 PROCEDURE — G8417 CALC BMI ABV UP PARAM F/U: HCPCS | Performed by: INTERNAL MEDICINE

## 2021-04-29 PROCEDURE — 1090F PRES/ABSN URINE INCON ASSESS: CPT | Performed by: INTERNAL MEDICINE

## 2021-04-29 PROCEDURE — 1101F PT FALLS ASSESS-DOCD LE1/YR: CPT | Performed by: INTERNAL MEDICINE

## 2021-04-29 PROCEDURE — G0463 HOSPITAL OUTPT CLINIC VISIT: HCPCS | Performed by: INTERNAL MEDICINE

## 2021-04-29 PROCEDURE — 3017F COLORECTAL CA SCREEN DOC REV: CPT | Performed by: INTERNAL MEDICINE

## 2021-04-29 PROCEDURE — G8399 PT W/DXA RESULTS DOCUMENT: HCPCS | Performed by: INTERNAL MEDICINE

## 2021-04-29 PROCEDURE — G8427 DOCREV CUR MEDS BY ELIG CLIN: HCPCS | Performed by: INTERNAL MEDICINE

## 2021-04-29 PROCEDURE — 3046F HEMOGLOBIN A1C LEVEL >9.0%: CPT | Performed by: INTERNAL MEDICINE

## 2021-04-29 PROCEDURE — G8536 NO DOC ELDER MAL SCRN: HCPCS | Performed by: INTERNAL MEDICINE

## 2021-04-29 PROCEDURE — 99214 OFFICE O/P EST MOD 30 MIN: CPT | Performed by: INTERNAL MEDICINE

## 2021-04-29 PROCEDURE — G8510 SCR DEP NEG, NO PLAN REQD: HCPCS | Performed by: INTERNAL MEDICINE

## 2021-04-29 PROCEDURE — G8753 SYS BP > OR = 140: HCPCS | Performed by: INTERNAL MEDICINE

## 2021-04-29 PROCEDURE — 2022F DILAT RTA XM EVC RTNOPTHY: CPT | Performed by: INTERNAL MEDICINE

## 2021-04-29 RX ORDER — CELECOXIB 200 MG/1
CAPSULE ORAL
COMMUNITY
Start: 2021-02-02 | End: 2021-04-29 | Stop reason: DRUGHIGH

## 2021-04-29 RX ORDER — DOXYCYCLINE HYCLATE 50 MG/1
CAPSULE ORAL
COMMUNITY
Start: 2021-04-20 | End: 2021-08-03

## 2021-04-29 RX ORDER — METHOTREXATE 2.5 MG/1
TABLET ORAL
COMMUNITY
Start: 2021-04-28

## 2021-04-29 RX ORDER — CELECOXIB 200 MG/1
200 CAPSULE ORAL 2 TIMES DAILY
Qty: 60 CAP | Refills: 0 | Status: SHIPPED | OUTPATIENT
Start: 2021-04-29 | End: 2021-04-30

## 2021-04-29 RX ORDER — FOLIC ACID 1 MG/1
TABLET ORAL
COMMUNITY
Start: 2021-01-27

## 2021-04-29 NOTE — TELEPHONE ENCOUNTER
From: Yajaira Mae  To: Arturo Chavez MD  Sent: 4/28/2021 5:10 PM EDT  Subject: Visit Follow-Up Question    See you tomorrow ( Thursday) at 3:00.   Harriett Recio

## 2021-04-29 NOTE — PROGRESS NOTES
HISTORY OF PRESENT ILLNESS  Mulugeta Sahni is a 79 y.o. female. HPI  Seen with increased pain and stiffness both sides of back, radiating towards thighs and somewhat towards back of legs. Worse on right than left. No fall or injury. Does use a walker for long distance, in her house only uses a cane. Sees Dr. Ghada Perez in May. Does have a diagnosis of ankylosing spondylitis and sacroiliitis. Currently on Celebrex once a day and Methotrexate. Blood pressure is also elevated. She is not having fevers, chills, urinary symptoms or GI symptoms. Review of Systems   Constitutional: Negative for chills, fever and weight loss. Respiratory: Negative for cough, shortness of breath and wheezing. Cardiovascular: Negative for chest pain, palpitations, orthopnea, leg swelling and PND. Gastrointestinal: Negative for abdominal pain, heartburn and nausea. Musculoskeletal: Positive for back pain. Negative for falls and myalgias. Neurological: Negative for dizziness, sensory change, focal weakness and headaches. Physical Exam  Vitals signs and nursing note reviewed. Constitutional:       Appearance: She is well-developed. HENT:      Head: Normocephalic and atraumatic. Neck:      Musculoskeletal: Normal range of motion and neck supple. Thyroid: No thyromegaly. Vascular: No carotid bruit. Cardiovascular:      Rate and Rhythm: Normal rate and regular rhythm. Heart sounds: Normal heart sounds, S1 normal and S2 normal. No murmur. Pulmonary:      Effort: Pulmonary effort is normal. No respiratory distress. Breath sounds: Normal breath sounds. No wheezing or rales. Musculoskeletal:      Comments: Able to get on and off exam table slowly   Walks with walker  No focal vertebral point pain but tender over si joints bilaterally   Neurological:      Mental Status: She is alert and oriented to person, place, and time.    Psychiatric:         Behavior: Behavior normal.         ASSESSMENT and PLAN  Diagnoses and all orders for this visit:    1. Essential hypertension with goal blood pressure less than 130/80    2. Ankylosing spondylitis, unspecified site of spine (HCC)  -     celecoxib (CELEBREX) 200 mg capsule; Take 1 Cap by mouth two (2) times a day for 90 days.  -     REFERRAL TO PHYSICAL THERAPY    3. Sacroiliitis (HCC)-inc to bid celebrex and if markers are elevated then see dr chang sooner  Avoid steroids given bp which is up today  -     SED RATE (ESR); Future  -     CRP, HIGH SENSITIVITY; Future  -     REFERRAL TO PHYSICAL THERAPY    4. Primary immune deficiency disorder ([facility] [address]    5. Type 2 diabetes mellitus with hyperglycemia, without long-term current use of insulin (HCC)  -     HEMOGLOBIN A1C WITH EAG; Future  -     METABOLIC PANEL, COMPREHENSIVE;  Future

## 2021-04-30 LAB
ALBUMIN SERPL-MCNC: 4.2 G/DL (ref 3.5–5)
ALBUMIN/GLOB SERPL: 1.4 {RATIO} (ref 1.1–2.2)
ALP SERPL-CCNC: 62 U/L (ref 45–117)
ALT SERPL-CCNC: 33 U/L (ref 12–78)
ANION GAP SERPL CALC-SCNC: 9 MMOL/L (ref 5–15)
AST SERPL-CCNC: 14 U/L (ref 15–37)
BILIRUB SERPL-MCNC: 0.5 MG/DL (ref 0.2–1)
BUN SERPL-MCNC: 15 MG/DL (ref 6–20)
BUN/CREAT SERPL: 14 (ref 12–20)
CALCIUM SERPL-MCNC: 9.4 MG/DL (ref 8.5–10.1)
CHLORIDE SERPL-SCNC: 102 MMOL/L (ref 97–108)
CO2 SERPL-SCNC: 26 MMOL/L (ref 21–32)
CREAT SERPL-MCNC: 1.04 MG/DL (ref 0.55–1.02)
CRP SERPL HS-MCNC: 1.5 MG/L
ERYTHROCYTE [SEDIMENTATION RATE] IN BLOOD: 8 MM/HR (ref 0–30)
EST. AVERAGE GLUCOSE BLD GHB EST-MCNC: 148 MG/DL
GLOBULIN SER CALC-MCNC: 3 G/DL (ref 2–4)
GLUCOSE SERPL-MCNC: 271 MG/DL (ref 65–100)
HBA1C MFR BLD: 6.8 % (ref 4–5.6)
POTASSIUM SERPL-SCNC: 3.7 MMOL/L (ref 3.5–5.1)
PROT SERPL-MCNC: 7.2 G/DL (ref 6.4–8.2)
SODIUM SERPL-SCNC: 137 MMOL/L (ref 136–145)

## 2021-04-30 RX ORDER — CELECOXIB 200 MG/1
CAPSULE ORAL
Qty: 60 CAP | Refills: 0 | Status: SHIPPED | OUTPATIENT
Start: 2021-04-30 | End: 2021-05-11

## 2021-05-11 ENCOUNTER — OFFICE VISIT (OUTPATIENT)
Dept: INTERNAL MEDICINE CLINIC | Age: 71
End: 2021-05-11
Payer: MEDICARE

## 2021-05-11 VITALS
WEIGHT: 167.2 LBS | TEMPERATURE: 97.9 F | DIASTOLIC BLOOD PRESSURE: 100 MMHG | RESPIRATION RATE: 16 BRPM | OXYGEN SATURATION: 97 % | HEART RATE: 78 BPM | SYSTOLIC BLOOD PRESSURE: 150 MMHG | BODY MASS INDEX: 30.77 KG/M2 | HEIGHT: 62 IN

## 2021-05-11 DIAGNOSIS — M45.9 ANKYLOSING SPONDYLITIS, UNSPECIFIED SITE OF SPINE (HCC): ICD-10-CM

## 2021-05-11 DIAGNOSIS — M46.1 SACROILIITIS (HCC): ICD-10-CM

## 2021-05-11 DIAGNOSIS — I10 ESSENTIAL HYPERTENSION WITH GOAL BLOOD PRESSURE LESS THAN 130/80: Primary | ICD-10-CM

## 2021-05-11 PROCEDURE — 99214 OFFICE O/P EST MOD 30 MIN: CPT | Performed by: INTERNAL MEDICINE

## 2021-05-11 PROCEDURE — G8753 SYS BP > OR = 140: HCPCS | Performed by: INTERNAL MEDICINE

## 2021-05-11 PROCEDURE — G8417 CALC BMI ABV UP PARAM F/U: HCPCS | Performed by: INTERNAL MEDICINE

## 2021-05-11 PROCEDURE — G0463 HOSPITAL OUTPT CLINIC VISIT: HCPCS | Performed by: INTERNAL MEDICINE

## 2021-05-11 PROCEDURE — G8755 DIAS BP > OR = 90: HCPCS | Performed by: INTERNAL MEDICINE

## 2021-05-11 PROCEDURE — 3017F COLORECTAL CA SCREEN DOC REV: CPT | Performed by: INTERNAL MEDICINE

## 2021-05-11 PROCEDURE — G8399 PT W/DXA RESULTS DOCUMENT: HCPCS | Performed by: INTERNAL MEDICINE

## 2021-05-11 PROCEDURE — 1090F PRES/ABSN URINE INCON ASSESS: CPT | Performed by: INTERNAL MEDICINE

## 2021-05-11 PROCEDURE — G8427 DOCREV CUR MEDS BY ELIG CLIN: HCPCS | Performed by: INTERNAL MEDICINE

## 2021-05-11 PROCEDURE — G8432 DEP SCR NOT DOC, RNG: HCPCS | Performed by: INTERNAL MEDICINE

## 2021-05-11 PROCEDURE — 1101F PT FALLS ASSESS-DOCD LE1/YR: CPT | Performed by: INTERNAL MEDICINE

## 2021-05-11 PROCEDURE — G8536 NO DOC ELDER MAL SCRN: HCPCS | Performed by: INTERNAL MEDICINE

## 2021-05-11 RX ORDER — AMLODIPINE BESYLATE 5 MG/1
5 TABLET ORAL DAILY
Qty: 30 TAB | Refills: 3 | Status: SHIPPED | OUTPATIENT
Start: 2021-05-11 | End: 2021-06-08 | Stop reason: DRUGHIGH

## 2021-05-11 RX ORDER — CELECOXIB 200 MG/1
200 CAPSULE ORAL DAILY
Qty: 30 CAP | Refills: 2 | Status: SHIPPED | OUTPATIENT
Start: 2021-05-11 | End: 2021-11-16 | Stop reason: ALTCHOICE

## 2021-05-11 NOTE — PROGRESS NOTES
HISTORY OF PRESENT ILLNESS  Nelda Almonte is a 79 y.o. female. [facility]  Helpmycashfeliciano is having a terrible time with chronic pain in back, hips. Range of motion is limited. She sees Dr. Paula Bolaños for her knee later in the month. She sees Dr. Jm Vasquez at the end of the week. We have been using Celebrex 200 b.i.d., which has helped a bit with her pain, however pressure is staying high. I am going to drop Celebrex back to once a day and will add Amlodipine for blood pressure control. Discussed potential side effects. Review of Systems   Constitutional: Negative for chills, fever and weight loss. Respiratory: Negative for cough, shortness of breath and wheezing. Cardiovascular: Negative for chest pain, palpitations, orthopnea, leg swelling and PND. Gastrointestinal: Positive for diarrhea. Negative for heartburn and nausea. Musculoskeletal: Positive for back pain and joint pain. Negative for falls and myalgias. Neurological: Negative for dizziness and headaches. Physical Exam  Vitals signs and nursing note reviewed. Constitutional:       Appearance: She is well-developed. HENT:      Head: Normocephalic and atraumatic. Neck:      Musculoskeletal: Normal range of motion and neck supple. Thyroid: No thyromegaly. Vascular: No carotid bruit. Cardiovascular:      Rate and Rhythm: Normal rate and regular rhythm. Heart sounds: Normal heart sounds, S1 normal and S2 normal. No murmur. Pulmonary:      Effort: Pulmonary effort is normal. No respiratory distress. Breath sounds: Normal breath sounds. No wheezing or rales. Musculoskeletal:      Right lower leg: No edema. Left lower leg: No edema. Comments: Walking slowly with a walker   Neurological:      Mental Status: She is alert and oriented to person, place, and time. Psychiatric:         Behavior: Behavior normal.         ASSESSMENT and PLAN  Diagnoses and all orders for this visit:    1.  Essential hypertension with goal blood pressure less than 130/80  -     amLODIPine (NORVASC) 5 mg tablet; Take 1 Tab by mouth daily. 2. Ankylosing spondylitis, unspecified site of spine (HCC)  -     celecoxib (CELEBREX) 200 mg capsule; Take 1 Cap by mouth daily.     3. Sacroiliitis (HCC)      the following changes in treatment are made: add norvasc and dec dose of celebrex from bid to every day  See dr chang as  Planned for pain management with as  appt here in 1 mo

## 2021-05-20 ENCOUNTER — HOSPITAL ENCOUNTER (OUTPATIENT)
Dept: PHYSICAL THERAPY | Age: 71
Discharge: HOME | End: 2021-05-20
Payer: MEDICARE

## 2021-05-20 PROCEDURE — 97110 THERAPEUTIC EXERCISES: CPT

## 2021-05-20 PROCEDURE — 97162 PT EVAL MOD COMPLEX 30 MIN: CPT
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
## 2021-05-24 ENCOUNTER — HOSPITAL ENCOUNTER (OUTPATIENT)
Dept: PHYSICAL THERAPY | Age: 71
Discharge: HOME | End: 2021-05-24
Payer: MEDICARE

## 2021-05-24 PROCEDURE — 97110 THERAPEUTIC EXERCISES: CPT

## 2021-05-24 NOTE — PROGRESS NOTES
PT DAILY TREATMENT NOTE - [facility] -15    Patient Name: Juanjose Chapman  Date:2021  : 1950  [x]  Patient  Verified  Payor: Gerhardt Hinders / Plan: [place] MEDICARE PART A & B / Product Type: Medicare /    In time: 4:12 PM  Out time: 4:55 PM  Total Treatment Time (min): 43 minutes  Total Timed Codes (min): 43 minutes  1:1 Treatment Time ( [address] only): 38 minutes   Visit #:  2    Treatment Area: Ankylosing spondylitis of unspecified sites in spine [M45.9]  Sacroiliitis, not elsewhere classified [M46.1]    SUBJECTIVE  Pain Level (0-10 scale): 7/10  Any medication changes, allergies to medications, adverse drug reactions, diagnosis change, or new procedure performed?: [x] No    [] Yes (see summary sheet for update)  Subjective functional status/changes:   [] No changes reported  The Pt reports that she felt good when she left, but then she went home and the pain increased. She was in more pain the next day and saw her rheumatologist who recommended she get an xray, start tramadol, and see a neurosurgeon to see if she needs an MRI. She has not done any exercises at home because she is afraid. OBJECTIVE    38 min Therapeutic Exercise:  [x] See flow sheet :   Rationale: increase ROM, increase strength, improve coordination, improve balance and increase proprioception to improve the patients ability to ambulate and perform ADLs with less pain or discomfort. With   [x] TE   [] TA   [] Neuro   [] SC   [] other: Patient Education: [x] Review HEP    [] Progressed/Changed HEP based on:   [] positioning   [] body mechanics   [] transfers   [] heat/ice application    [] other:      Other Objective/Functional Measures: None noted     Pain Level (0-10 scale) post treatment: 5/10    ASSESSMENT/Changes in Function:   No new exercises were added today and went over form and technique of her stretches that she did well.   She is going to get her xray and talk with her rheumatologist about whether to continue PT based on those results. If it is deemed safe, will progress to add in strength exercises as tolerated. Patient will continue to benefit from skilled PT services to modify and progress therapeutic interventions, address functional mobility deficits, address ROM deficits, address strength deficits, analyze and address soft tissue restrictions, analyze and cue movement patterns, analyze and modify body mechanics/ergonomics, assess and modify postural abnormalities and instruct in home and community integration to attain remaining goals. []  See Plan of Care  []  See progress note/recertification  []  See Discharge Summary         Progress towards goals / Updated goals:  Short Term Goals: To be accomplished in 6 treatments:  1. The Pt will be independent and compliant with their HEP- progressing  2. The Pt will report a 50% reduction in their pain with ADLs- progressing  Long Term Goals: To be accomplished in 16 treatments:  1. The Pt will score the MCII on her FOTO survey demonstrating improved overall function (28 to 47 points)- progressing  2. The Pt will be able to walk >/= 20 minutes with 0-3/10 pain to allow the Pt to be able to return to walking for recreation- progressing  3. The Pt will be able to stand >/= 10 minutes with 0-3/10 pain to allow the Pt to be able to perform standing ADLS with less difficulty- progressing  4.  The Pt will be able to perform >/= 8 sit to stands in 30s demonstrating improved LE strength and endurance- progressing    PLAN  [x]  Upgrade activities as tolerated     [x]  Continue plan of care  [x]  Update interventions per flow sheet       []  Discharge due to:_  []  Other:_      Krystal Tsang, PT 5/24/2021
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
## 2021-05-27 ENCOUNTER — APPOINTMENT (OUTPATIENT)
Dept: PHYSICAL THERAPY | Age: 71
End: 2021-05-27
Payer: MEDICARE

## 2021-06-02 ENCOUNTER — APPOINTMENT (OUTPATIENT)
Dept: PHYSICAL THERAPY | Age: 71
End: 2021-06-02

## 2021-06-04 ENCOUNTER — APPOINTMENT (OUTPATIENT)
Dept: PHYSICAL THERAPY | Age: 71
End: 2021-06-04

## 2021-06-07 ENCOUNTER — APPOINTMENT (OUTPATIENT)
Dept: PHYSICAL THERAPY | Age: 71
End: 2021-06-07

## 2021-06-08 ENCOUNTER — OFFICE VISIT (OUTPATIENT)
Dept: INTERNAL MEDICINE CLINIC | Age: 71
End: 2021-06-08
Payer: MEDICARE

## 2021-06-08 VITALS
WEIGHT: 167.2 LBS | BODY MASS INDEX: 30.77 KG/M2 | RESPIRATION RATE: 16 BRPM | SYSTOLIC BLOOD PRESSURE: 124 MMHG | HEIGHT: 62 IN | HEART RATE: 88 BPM | DIASTOLIC BLOOD PRESSURE: 88 MMHG | OXYGEN SATURATION: 97 % | TEMPERATURE: 98.4 F

## 2021-06-08 DIAGNOSIS — M46.1 SACROILIITIS (HCC): ICD-10-CM

## 2021-06-08 DIAGNOSIS — M45.9 ANKYLOSING SPONDYLITIS, UNSPECIFIED SITE OF SPINE (HCC): ICD-10-CM

## 2021-06-08 DIAGNOSIS — E11.65 TYPE 2 DIABETES MELLITUS WITH HYPERGLYCEMIA, WITHOUT LONG-TERM CURRENT USE OF INSULIN (HCC): ICD-10-CM

## 2021-06-08 DIAGNOSIS — I10 ESSENTIAL HYPERTENSION WITH GOAL BLOOD PRESSURE LESS THAN 130/80: Primary | ICD-10-CM

## 2021-06-08 PROCEDURE — G8754 DIAS BP LESS 90: HCPCS | Performed by: INTERNAL MEDICINE

## 2021-06-08 PROCEDURE — G8427 DOCREV CUR MEDS BY ELIG CLIN: HCPCS | Performed by: INTERNAL MEDICINE

## 2021-06-08 PROCEDURE — G0463 HOSPITAL OUTPT CLINIC VISIT: HCPCS | Performed by: INTERNAL MEDICINE

## 2021-06-08 PROCEDURE — 3044F HG A1C LEVEL LT 7.0%: CPT | Performed by: INTERNAL MEDICINE

## 2021-06-08 PROCEDURE — 1090F PRES/ABSN URINE INCON ASSESS: CPT | Performed by: INTERNAL MEDICINE

## 2021-06-08 PROCEDURE — G8536 NO DOC ELDER MAL SCRN: HCPCS | Performed by: INTERNAL MEDICINE

## 2021-06-08 PROCEDURE — 3017F COLORECTAL CA SCREEN DOC REV: CPT | Performed by: INTERNAL MEDICINE

## 2021-06-08 PROCEDURE — G8752 SYS BP LESS 140: HCPCS | Performed by: INTERNAL MEDICINE

## 2021-06-08 PROCEDURE — G8417 CALC BMI ABV UP PARAM F/U: HCPCS | Performed by: INTERNAL MEDICINE

## 2021-06-08 PROCEDURE — 2022F DILAT RTA XM EVC RTNOPTHY: CPT | Performed by: INTERNAL MEDICINE

## 2021-06-08 PROCEDURE — 1101F PT FALLS ASSESS-DOCD LE1/YR: CPT | Performed by: INTERNAL MEDICINE

## 2021-06-08 PROCEDURE — 99214 OFFICE O/P EST MOD 30 MIN: CPT | Performed by: INTERNAL MEDICINE

## 2021-06-08 PROCEDURE — G8432 DEP SCR NOT DOC, RNG: HCPCS | Performed by: INTERNAL MEDICINE

## 2021-06-08 PROCEDURE — G8399 PT W/DXA RESULTS DOCUMENT: HCPCS | Performed by: INTERNAL MEDICINE

## 2021-06-08 RX ORDER — AMLODIPINE BESYLATE 10 MG/1
10 TABLET ORAL DAILY
Qty: 30 TABLET | Refills: 5 | Status: SHIPPED | OUTPATIENT
Start: 2021-06-08 | End: 2021-12-07

## 2021-06-08 RX ORDER — CEPHALEXIN 500 MG/1
CAPSULE ORAL
COMMUNITY
Start: 2021-05-24 | End: 2021-08-03

## 2021-06-08 RX ORDER — TRAMADOL HYDROCHLORIDE 50 MG/1
TABLET ORAL
COMMUNITY
Start: 2021-05-21 | End: 2022-11-01 | Stop reason: ALTCHOICE

## 2021-06-08 NOTE — PROGRESS NOTES
HISTORY OF PRESENT ILLNESS  Xiomara Kim is a 79 y.o. female. HPI  One month follow up. Issues:    1. Blood pressure was very high. We added Amlodipine 5. Denies constipation, in fact is having diarrhea. Pressure improved, but diastolics still in the 88 range. We will bump to 10 mg.  2. Significant pain. Has seen Dr. Jose Alberto Retana, who diagnosed bursitis in hip, but no knee issue. Has seen Dr. Lizz Link, who gave her Tramadol for pain, and she will be seeing Dr. Sriram Staples to discuss her back. Review of Systems   Constitutional: Negative for chills, fever and weight loss. Respiratory: Negative for cough, shortness of breath and wheezing. Cardiovascular: Negative for chest pain, palpitations, orthopnea, leg swelling and PND. Gastrointestinal: Negative for abdominal pain, constipation, heartburn and nausea. Musculoskeletal: Positive for back pain and joint pain. Negative for falls and myalgias. Neurological: Negative for dizziness and headaches. Physical Exam  Vitals and nursing note reviewed. Constitutional:       Appearance: She is well-developed. HENT:      Head: Normocephalic and atraumatic. Neck:      Thyroid: No thyromegaly. Vascular: No carotid bruit. Cardiovascular:      Rate and Rhythm: Normal rate and regular rhythm. Heart sounds: Normal heart sounds, S1 normal and S2 normal. No murmur heard. Pulmonary:      Effort: Pulmonary effort is normal. No respiratory distress. Breath sounds: Normal breath sounds. No wheezing or rales. Musculoskeletal:      Cervical back: Normal range of motion and neck supple. Right lower leg: No edema. Left lower leg: No edema. Neurological:      Mental Status: She is alert and oriented to person, place, and time. Psychiatric:         Behavior: Behavior normal.         ASSESSMENT and PLAN  Diagnoses and all orders for this visit:    1.  Essential hypertension with goal blood pressure less than 130/80  -     amLODIPine (NORVASC) 10 mg tablet; Take 1 Tablet by mouth daily. 2. Sacroiliitis ([address]    3. Ankylosing spondylitis, unspecified site of spine ([address]    4.  Type 2 diabetes mellitus with hyperglycemia, without long-term current use of insulin (HCC)      the following changes in treatment are made: inc from 5 to 10 mg norvasc  See dr Gilberto Coburn as planned for back  appt in 3mo

## 2021-06-10 ENCOUNTER — APPOINTMENT (OUTPATIENT)
Dept: PHYSICAL THERAPY | Age: 71
End: 2021-06-10

## 2021-06-14 ENCOUNTER — APPOINTMENT (OUTPATIENT)
Dept: PHYSICAL THERAPY | Age: 71
End: 2021-06-14

## 2021-06-15 ENCOUNTER — TRANSCRIBE ORDER (OUTPATIENT)
Dept: SCHEDULING | Age: 71
End: 2021-06-15

## 2021-06-15 DIAGNOSIS — M54.9 BACK PAIN: Primary | ICD-10-CM

## 2021-06-17 ENCOUNTER — APPOINTMENT (OUTPATIENT)
Dept: PHYSICAL THERAPY | Age: 71
End: 2021-06-17

## 2021-06-21 ENCOUNTER — APPOINTMENT (OUTPATIENT)
Dept: PHYSICAL THERAPY | Age: 71
End: 2021-06-21

## 2021-06-21 ENCOUNTER — HOSPITAL ENCOUNTER (OUTPATIENT)
Dept: MRI IMAGING | Age: 71
Discharge: HOME | End: 2021-06-21
Attending: NEUROLOGICAL SURGERY
Payer: MEDICARE

## 2021-06-21 DIAGNOSIS — M54.9 BACK PAIN: ICD-10-CM

## 2021-06-21 PROCEDURE — 72158 MRI LUMBAR SPINE W/O & W/DYE: CPT

## 2021-06-21 PROCEDURE — 74011636320 HC RX REV CODE- 636/320: Performed by: NEUROLOGICAL SURGERY

## 2021-06-21 PROCEDURE — A9576 INJ PROHANCE MULTIPACK: HCPCS | Performed by: NEUROLOGICAL SURGERY

## 2021-06-21 RX ADMIN — GADOTERIDOL 15 ML: 279.3 INJECTION, SOLUTION INTRAVENOUS at 13:50

## 2021-06-24 ENCOUNTER — APPOINTMENT (OUTPATIENT)
Dept: PHYSICAL THERAPY | Age: 71
End: 2021-06-24

## 2021-06-28 ENCOUNTER — APPOINTMENT (OUTPATIENT)
Dept: PHYSICAL THERAPY | Age: 71
End: 2021-06-28

## 2021-06-30 ENCOUNTER — TRANSCRIBE ORDER (OUTPATIENT)
Dept: SCHEDULING | Age: 71
End: 2021-06-30

## 2021-06-30 DIAGNOSIS — M54.50 LOW BACK PAIN: ICD-10-CM

## 2021-06-30 DIAGNOSIS — M45.6 ANKYLOSING SPONDYLITIS OF LUMBAR REGION (HCC): Primary | ICD-10-CM

## 2021-07-02 ENCOUNTER — PATIENT MESSAGE (OUTPATIENT)
Dept: INTERNAL MEDICINE CLINIC | Age: 71
End: 2021-07-02

## 2021-07-06 ENCOUNTER — HOSPITAL ENCOUNTER (OUTPATIENT)
Dept: INTERVENTIONAL RADIOLOGY/VASCULAR | Age: 71
Discharge: HOME | End: 2021-07-06
Attending: NEUROLOGICAL SURGERY
Payer: MEDICARE

## 2021-07-06 VITALS
WEIGHT: 174 LBS | DIASTOLIC BLOOD PRESSURE: 76 MMHG | HEART RATE: 95 BPM | SYSTOLIC BLOOD PRESSURE: 135 MMHG | HEIGHT: 62 IN | RESPIRATION RATE: 22 BRPM | OXYGEN SATURATION: 99 % | TEMPERATURE: 98.9 F | BODY MASS INDEX: 32.02 KG/M2

## 2021-07-06 DIAGNOSIS — M54.50 LOW BACK PAIN: ICD-10-CM

## 2021-07-06 DIAGNOSIS — M45.6 ANKYLOSING SPONDYLITIS OF LUMBAR REGION (HCC): ICD-10-CM

## 2021-07-06 PROCEDURE — 64483 NJX AA&/STRD TFRM EPI L/S 1: CPT

## 2021-07-06 PROCEDURE — 74011000250 HC RX REV CODE- 250: Performed by: STUDENT IN AN ORGANIZED HEALTH CARE EDUCATION/TRAINING PROGRAM

## 2021-07-06 PROCEDURE — 2709999900 HC NON-CHARGEABLE SUPPLY

## 2021-07-06 PROCEDURE — 74011000636 HC RX REV CODE- 636: Performed by: STUDENT IN AN ORGANIZED HEALTH CARE EDUCATION/TRAINING PROGRAM

## 2021-07-06 PROCEDURE — 77030003666 HC NDL SPINAL BD -A

## 2021-07-06 PROCEDURE — 74011250636 HC RX REV CODE- 250/636: Performed by: STUDENT IN AN ORGANIZED HEALTH CARE EDUCATION/TRAINING PROGRAM

## 2021-07-06 RX ORDER — LIDOCAINE HYDROCHLORIDE 20 MG/ML
15 INJECTION, SOLUTION INFILTRATION; PERINEURAL ONCE
Status: COMPLETED | OUTPATIENT
Start: 2021-07-06 | End: 2021-07-06

## 2021-07-06 RX ORDER — DEXAMETHASONE SODIUM PHOSPHATE 10 MG/ML
10 INJECTION INTRAMUSCULAR; INTRAVENOUS ONCE
Status: COMPLETED | OUTPATIENT
Start: 2021-07-06 | End: 2021-07-06

## 2021-07-06 RX ORDER — LIDOCAINE HYDROCHLORIDE 10 MG/ML
2 INJECTION, SOLUTION EPIDURAL; INFILTRATION; INTRACAUDAL; PERINEURAL ONCE
Status: COMPLETED | OUTPATIENT
Start: 2021-07-06 | End: 2021-07-06

## 2021-07-06 RX ADMIN — DEXAMETHASONE SODIUM PHOSPHATE 15 MG: 10 INJECTION, SOLUTION INTRAMUSCULAR; INTRAVENOUS at 14:13

## 2021-07-06 RX ADMIN — IOPAMIDOL 4 ML: 408 INJECTION, SOLUTION INTRATHECAL at 14:11

## 2021-07-06 RX ADMIN — LIDOCAINE HYDROCHLORIDE 5 ML: 20 INJECTION, SOLUTION INFILTRATION; PERINEURAL at 14:09

## 2021-07-06 RX ADMIN — LIDOCAINE HYDROCHLORIDE 4 ML: 10 INJECTION, SOLUTION EPIDURAL; INFILTRATION; INTRACAUDAL; PERINEURAL at 14:13

## 2021-07-06 NOTE — ROUTINE PROCESS
1355-Dr. Kathia Lopez into speak with pt and procedure explained along with risks and benefits; consent obtained for HITESH. 0770-Discharge instructions given and reviewed with pt and pt voices complete understanding of all instructions given. Pt taken out via wheelchair and discharged to home with  in car.
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
## 2021-07-06 NOTE — PROCEDURES
Interventional Radiology  Procedure Note        7/6/2021 2:17 PM    Patient: Fatmata Townsend     Informed consent obtained    Diagnosis: Back pain with BLE radiculopathy    Procedure(s): Bilateral L2-3 transforaminal HITESH    Specimens removed:  none    Complications: None    Primary Physician: Maddy Simpson MD    Recommendations: N/A    Discharge Disposition: Stable; discharge to home    Full dictated report to follow    Maddy Simpson MD  Interventional Radiology  [facility]  2:17 PM, 7/6/2021

## 2021-07-06 NOTE — TELEPHONE ENCOUNTER
From: Karin Marquez  To: Nando Ramirez MD  Sent: 7/2/2021 6:31 PM EDT  Subject: Update Medical Information    Dr Nafisa Aponte,   Since I saw you last, I have had xrays and MRI of my lower back. I. Have acquired a neurosurgeon, Dr. Den Jain, and some new meds. Dr. Petty Preston has prescribed Tramadol 50 mg three times a day for pain and Dr. Semaj Smith has addedCyclobenzaprine. Next Tuesday I will begin getting shots in my back for pain at [facility].    I will try to get an appointment to see you, later in the month. Have a great fourth.   Latoya Gil

## 2021-07-06 NOTE — DISCHARGE INSTRUCTIONS
[address] [facility]  Special Procedures/Radiology Department      Steroidal Injection      Go home and rest.     No vigorous physical activity today. Be aware that numbness and/or tingling can occur up to 24 hours after the injection. No driving today. Resume your previous diet. Resume your previous medications. Depending on your job, you may return to work in 25 to 48 hours. It may take up to one week after the injection to see a change or an improvement in your symptoms. Be sure to follow up with your physician. Tell your physician if the injection helped with your symptoms or if the injection did nothing for your symptoms. For minor discomfort, you can take Tylenol, as directed on the label.       If you have any questions or concerns, please call [phone] and ask for the nurse on-call

## 2021-07-12 NOTE — ANCILLARY DISCHARGE INSTRUCTIONS
[facility]  [address] [place]  Phone: [phone] Fax: [phone]    Discharge Summary 2-15    Patient name: Karma Gallegos  : 1950  Provider#: [id number]  Referral source: Britt Beasley MD      Medical/Treatment Diagnosis: Ankylosing spondylitis of unspecified sites in spine [M45.9]  Sacroiliitis, not elsewhere classified [M46.1]     Prior Hospitalization: see medical history     Comorbidities: See Plan of Care  Prior Level of Function: See Plan of Care  Medications: Verified on Patient Summary List    Start of Care: 21      Onset Date:[facility] 3/20/21   Visits from Start of Care: 2     Missed Visits: 0  Reporting Period : 21 to 21    Assessment/Summary of care: Pt is discharged today, 2021, as they have stopped attending therapy. Final objective data and outcomes were unable to be obtained.     RECOMMENDATIONS:  [x]Discontinue therapy: []Patient has reached or is progressing toward set goals     [x]Patient is non-compliant or has abdicated     []Due to lack of appreciable progress towards set goals     []Rolf Kaur PT 2021

## 2021-07-13 DIAGNOSIS — I10 ESSENTIAL HYPERTENSION WITH GOAL BLOOD PRESSURE LESS THAN 130/80: ICD-10-CM

## 2021-07-13 DIAGNOSIS — E11.65 TYPE 2 DIABETES MELLITUS WITH HYPERGLYCEMIA, WITHOUT LONG-TERM CURRENT USE OF INSULIN (HCC): ICD-10-CM

## 2021-07-13 DIAGNOSIS — F51.02 ADJUSTMENT INSOMNIA: ICD-10-CM

## 2021-07-13 RX ORDER — OLMESARTAN MEDOXOMIL AND HYDROCHLOROTHIAZIDE 40/25 40; 25 MG/1; MG/1
TABLET ORAL
Qty: 90 TABLET | Refills: 0 | Status: SHIPPED | OUTPATIENT
Start: 2021-07-13 | End: 2021-08-03 | Stop reason: DRUGHIGH

## 2021-07-13 RX ORDER — METFORMIN HYDROCHLORIDE 500 MG/1
TABLET, EXTENDED RELEASE ORAL
Qty: 180 TABLET | Refills: 1 | Status: SHIPPED | OUTPATIENT
Start: 2021-07-13 | End: 2022-01-14

## 2021-07-13 RX ORDER — SERTRALINE HYDROCHLORIDE 50 MG/1
TABLET, FILM COATED ORAL
Qty: 90 TABLET | Refills: 1 | Status: SHIPPED | OUTPATIENT
Start: 2021-07-13 | End: 2021-10-11 | Stop reason: DRUGHIGH

## 2021-07-23 DIAGNOSIS — I10 ESSENTIAL HYPERTENSION WITH GOAL BLOOD PRESSURE LESS THAN 130/80: ICD-10-CM

## 2021-07-27 ENCOUNTER — PATIENT MESSAGE (OUTPATIENT)
Dept: INTERNAL MEDICINE CLINIC | Age: 71
End: 2021-07-27

## 2021-07-27 NOTE — TELEPHONE ENCOUNTER
From: Modesto Davila  To: Chelsea Gomez MD  Sent: 7/27/2021 8:27 AM EDT  Subject: Visit Follow-Up Question    Could not make it today, but found the key for getting hold of Dr. Ralf Sheikh and Dr. Jose Fernando . NHave stopped the muscle relaxer , and feeling better and not falling as often. I already have an appointment scheduled with you next Tuesday. Hopefully by then, [facility] will have had a cancellation and I will have more info for you. Thanks.   Paulette Diaz

## 2021-08-03 ENCOUNTER — OFFICE VISIT (OUTPATIENT)
Dept: INTERNAL MEDICINE CLINIC | Age: 71
End: 2021-08-03
Payer: MEDICARE

## 2021-08-03 VITALS
HEART RATE: 92 BPM | WEIGHT: 171.4 LBS | BODY MASS INDEX: 31.54 KG/M2 | SYSTOLIC BLOOD PRESSURE: 107 MMHG | DIASTOLIC BLOOD PRESSURE: 70 MMHG | TEMPERATURE: 98.6 F | HEIGHT: 62 IN | OXYGEN SATURATION: 97 % | RESPIRATION RATE: 16 BRPM

## 2021-08-03 DIAGNOSIS — M46.1 SACROILIITIS (HCC): ICD-10-CM

## 2021-08-03 DIAGNOSIS — E11.65 TYPE 2 DIABETES MELLITUS WITH HYPERGLYCEMIA, WITHOUT LONG-TERM CURRENT USE OF INSULIN (HCC): ICD-10-CM

## 2021-08-03 DIAGNOSIS — I10 ESSENTIAL HYPERTENSION: Primary | ICD-10-CM

## 2021-08-03 DIAGNOSIS — B37.9 CANDIDIASIS: ICD-10-CM

## 2021-08-03 DIAGNOSIS — G89.29 OTHER CHRONIC PAIN: ICD-10-CM

## 2021-08-03 DIAGNOSIS — Z23 ENCOUNTER FOR IMMUNIZATION: ICD-10-CM

## 2021-08-03 DIAGNOSIS — E78.5 DYSLIPIDEMIA, GOAL LDL BELOW 70: ICD-10-CM

## 2021-08-03 PROCEDURE — G8399 PT W/DXA RESULTS DOCUMENT: HCPCS | Performed by: INTERNAL MEDICINE

## 2021-08-03 PROCEDURE — 90471 IMMUNIZATION ADMIN: CPT | Performed by: INTERNAL MEDICINE

## 2021-08-03 PROCEDURE — G8752 SYS BP LESS 140: HCPCS | Performed by: INTERNAL MEDICINE

## 2021-08-03 PROCEDURE — G8754 DIAS BP LESS 90: HCPCS | Performed by: INTERNAL MEDICINE

## 2021-08-03 PROCEDURE — 90715 TDAP VACCINE 7 YRS/> IM: CPT | Performed by: INTERNAL MEDICINE

## 2021-08-03 PROCEDURE — G8432 DEP SCR NOT DOC, RNG: HCPCS | Performed by: INTERNAL MEDICINE

## 2021-08-03 PROCEDURE — 1101F PT FALLS ASSESS-DOCD LE1/YR: CPT | Performed by: INTERNAL MEDICINE

## 2021-08-03 PROCEDURE — 1090F PRES/ABSN URINE INCON ASSESS: CPT | Performed by: INTERNAL MEDICINE

## 2021-08-03 PROCEDURE — G0463 HOSPITAL OUTPT CLINIC VISIT: HCPCS | Performed by: INTERNAL MEDICINE

## 2021-08-03 PROCEDURE — 99214 OFFICE O/P EST MOD 30 MIN: CPT | Performed by: INTERNAL MEDICINE

## 2021-08-03 PROCEDURE — 3017F COLORECTAL CA SCREEN DOC REV: CPT | Performed by: INTERNAL MEDICINE

## 2021-08-03 PROCEDURE — 2022F DILAT RTA XM EVC RTNOPTHY: CPT | Performed by: INTERNAL MEDICINE

## 2021-08-03 PROCEDURE — G8417 CALC BMI ABV UP PARAM F/U: HCPCS | Performed by: INTERNAL MEDICINE

## 2021-08-03 PROCEDURE — G8427 DOCREV CUR MEDS BY ELIG CLIN: HCPCS | Performed by: INTERNAL MEDICINE

## 2021-08-03 PROCEDURE — 3044F HG A1C LEVEL LT 7.0%: CPT | Performed by: INTERNAL MEDICINE

## 2021-08-03 PROCEDURE — 90471 IMMUNIZATION ADMIN: CPT

## 2021-08-03 PROCEDURE — G8536 NO DOC ELDER MAL SCRN: HCPCS | Performed by: INTERNAL MEDICINE

## 2021-08-03 RX ORDER — OXICONAZOLE NITRATE 10 MG/G
CREAM TOPICAL DAILY
Qty: 15 G | Refills: 0 | Status: SHIPPED | OUTPATIENT
Start: 2021-08-03 | End: 2022-11-01

## 2021-08-03 RX ORDER — OLMESARTAN MEDOXOMIL AND HYDROCHLOROTHIAZIDE 40/12.5 40; 12.5 MG/1; MG/1
1 TABLET ORAL DAILY
Qty: 90 TABLET | Refills: 1 | Status: SHIPPED | OUTPATIENT
Start: 2021-08-03 | End: 2022-06-22

## 2021-08-03 NOTE — PROGRESS NOTES
HISTORY OF PRESENT ILLNESS  Jonathan Bingham is a 79 y.o. female. HPI  Follow up for med check. She does continue to have significant back pain with known sacroiliitis and DJD. Did see Dr. Wilber Farrar, who has released her. Currently managing with Celebrex 200 a day, Tramadol 2-3 per day and now starting water exercises and feels it is manageable. Hypertension, on Olmesartan/HCTZ 40/25 and Amlodipine 10. She has had some dizzy spells and falls. Pressure is relatively low. We will drop the HCTZ to a 12.5. Diabetes. Sugars have been under reasonable control, but reports some yeast infection under breasts. Using topical steroids. Encouraged to stop and will send an antifungal instead. Continues with Dr. Mindi Dunn and on Methotrexate. Gets labs every three months. Review of Systems   Constitutional: Positive for malaise/fatigue. Negative for chills, fever and weight loss. Respiratory: Negative for cough, shortness of breath and wheezing. Cardiovascular: Negative for chest pain, palpitations, orthopnea, leg swelling and PND. Gastrointestinal: Negative for heartburn and nausea. Musculoskeletal: Positive for back pain. Negative for myalgias. Skin: Positive for itching and rash. Neurological: Negative for dizziness and headaches. Physical Exam  Vitals and nursing note reviewed. Constitutional:       Appearance: She is well-developed. HENT:      Head: Normocephalic and atraumatic. Neck:      Thyroid: No thyromegaly. Vascular: No carotid bruit. Cardiovascular:      Rate and Rhythm: Normal rate and regular rhythm. Heart sounds: Normal heart sounds, S1 normal and S2 normal. No murmur heard. Pulmonary:      Effort: Pulmonary effort is normal. No respiratory distress. Breath sounds: Normal breath sounds. No wheezing or rales. Musculoskeletal:      Cervical back: Normal range of motion and neck supple.    Skin:     Findings: Rash: erythema under both breasts and left axilla. Neurological:      Mental Status: She is alert and oriented to person, place, and time. Psychiatric:         Behavior: Behavior normal.         ASSESSMENT and PLAN  Diagnoses and all orders for this visit:    1. Essential hypertension  -     olmesartan-hydroCHLOROthiazide (BENICAR HCT) 40-12.5 mg per tablet; Take 1 Tablet by mouth daily. 2. Sacroiliitis (HCC)    3. Other chronic pain    4. Dyslipidemia, goal LDL below 70  -     METABOLIC PANEL, COMPREHENSIVE; Future  -     LIPID PANEL; Future    5. Type 2 diabetes mellitus with hyperglycemia, without long-term current use of insulin (HCC)  -     HEMOGLOBIN A1C WITH EAG; Future    6. Candidiasis  -     oxiconazole (OXISTAT) 1 % topical cream; Apply  to affected area daily.       the following changes in treatment are made: dec dose of hctz to minimize fall risk  appt in 3mo with labs

## 2021-08-04 LAB
ALBUMIN SERPL-MCNC: 4.3 G/DL (ref 3.5–5)
ALBUMIN/GLOB SERPL: 1.4 {RATIO} (ref 1.1–2.2)
ALP SERPL-CCNC: 61 U/L (ref 45–117)
ALT SERPL-CCNC: 52 U/L (ref 12–78)
ANION GAP SERPL CALC-SCNC: 6 MMOL/L (ref 5–15)
AST SERPL-CCNC: 21 U/L (ref 15–37)
BILIRUB SERPL-MCNC: 0.8 MG/DL (ref 0.2–1)
BUN SERPL-MCNC: 28 MG/DL (ref 6–20)
BUN/CREAT SERPL: 25 (ref 12–20)
CALCIUM SERPL-MCNC: 9.8 MG/DL (ref 8.5–10.1)
CHLORIDE SERPL-SCNC: 102 MMOL/L (ref 97–108)
CHOLEST SERPL-MCNC: 174 MG/DL
CO2 SERPL-SCNC: 28 MMOL/L (ref 21–32)
CREAT SERPL-MCNC: 1.14 MG/DL (ref 0.55–1.02)
EST. AVERAGE GLUCOSE BLD GHB EST-MCNC: 154 MG/DL
GLOBULIN SER CALC-MCNC: 3.1 G/DL (ref 2–4)
GLUCOSE SERPL-MCNC: 196 MG/DL (ref 65–100)
HBA1C MFR BLD: 7 % (ref 4–5.6)
HDLC SERPL-MCNC: 45 MG/DL
HDLC SERPL: 3.9 {RATIO} (ref 0–5)
LDLC SERPL CALC-MCNC: 69.6 MG/DL (ref 0–100)
POTASSIUM SERPL-SCNC: 3.5 MMOL/L (ref 3.5–5.1)
PROT SERPL-MCNC: 7.4 G/DL (ref 6.4–8.2)
SODIUM SERPL-SCNC: 136 MMOL/L (ref 136–145)
TRIGL SERPL-MCNC: 297 MG/DL (ref ?–150)
VLDLC SERPL CALC-MCNC: 59.4 MG/DL

## 2021-09-07 ENCOUNTER — HOSPITAL ENCOUNTER (OUTPATIENT)
Dept: WOUND CARE | Age: 71
Discharge: HOME | End: 2021-09-07
Payer: MEDICARE

## 2021-09-07 VITALS
DIASTOLIC BLOOD PRESSURE: 74 MMHG | TEMPERATURE: 97.6 F | HEART RATE: 100 BPM | RESPIRATION RATE: 16 BRPM | SYSTOLIC BLOOD PRESSURE: 156 MMHG

## 2021-09-07 PROCEDURE — 97597 DBRDMT OPN WND 1ST 20 CM/<: CPT

## 2021-09-07 NOTE — DISCHARGE INSTRUCTIONS
Discharge Instructions/Wound [address]  [address] [facility] [address], [address] [facility]  Telephone: [phone]    NAME:  Bonny Lau  YOB: 1950  MEDICAL RECORD NUMBER:  [id number]  DATE:  9/7/2021      WOUND CARE ORDERS:    Clean with soap and water, pat dry, apply venelex ointment, border foam   Change dressing every other day  Return to see MD in 3 weeks    TREATMENT ORDERS:    Elevate leg(s) above the level of the heart when sitting. Avoid prolonged standing in one place. Do no get dressing/wrap wet. Follow Diet as prescribed:   [] Diet as tolerated: [] Calorie Diabetic Diet: [] No Added Salt:  [] Increase Protein: [] Other:                 Return Appointment:  [] Return Appointment: With ***  in  *** Week(s)  [] Ordered tests:      Electronically signed Goldie Severance, RN on 9/7/2021 at [address] [place] [address]: Should you experience any significant changes in your wound(s) or have questions about your wound care, please contact the [address] [place] [address] at [address] [place] [address] 8:00 am - 4:30. If you need help with your wound outside these hours and cannot wait until we are again available, contact your PCP or go to the hospital emergency room. PLEASE NOTE: IF YOU ARE UNABLE TO OBTAIN WOUND SUPPLIES, CONTINUE TO USE THE SUPPLIES YOU HAVE AVAILABLE UNTIL YOU ARE ABLE TO REACH US. IT IS MOST IMPORTANT TO KEEP THE WOUND COVERED AT ALL TIMES.      Physician Signature:_______________________    Date: ___________ Time:  ____________
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
## 2021-09-07 NOTE — WOUND CARE
09/07/21 1049   Wound Sacrum 09/07/21   Date First Assessed/Time First Assessed: 09/07/21 1049   Wound Approximate Age at First Assessment (Weeks): 5 weeks  Primary Wound Type: Pressure Injury  Location: Sacrum  Date of First Observation: 09/07/21   Wound Image    Wound Etiology Pressure Stage 3   Cleansed Cleansed with saline   Wound Length (cm) 0.3 cm   Wound Width (cm) 0.3 cm   Wound Depth (cm) 0.2 cm   Wound Surface Area (cm^2) 0.09 cm^2   Wound Volume (cm^3) 0.018 cm^3   Wound Assessment Pink/red   Drainage Amount Small   Drainage Description Serous   Wound Odor None   Ashely-Wound/Incision Assessment Intact   Edges Flat/open edges   Wound Thickness Description Full thickness     Visit Vitals  BP (!) 156/74   Pulse 100   Temp 97.6 °F (36.4 °C)   Resp 16

## 2021-09-29 ENCOUNTER — VIRTUAL VISIT (OUTPATIENT)
Dept: INTERNAL MEDICINE CLINIC | Age: 71
End: 2021-09-29
Payer: MEDICARE

## 2021-09-29 DIAGNOSIS — J40 BRONCHITIS: Primary | ICD-10-CM

## 2021-09-29 PROCEDURE — 99213 OFFICE O/P EST LOW 20 MIN: CPT | Performed by: INTERNAL MEDICINE

## 2021-09-29 PROCEDURE — 1090F PRES/ABSN URINE INCON ASSESS: CPT | Performed by: INTERNAL MEDICINE

## 2021-09-29 PROCEDURE — 1101F PT FALLS ASSESS-DOCD LE1/YR: CPT | Performed by: INTERNAL MEDICINE

## 2021-09-29 PROCEDURE — G8427 DOCREV CUR MEDS BY ELIG CLIN: HCPCS | Performed by: INTERNAL MEDICINE

## 2021-09-29 PROCEDURE — 3017F COLORECTAL CA SCREEN DOC REV: CPT | Performed by: INTERNAL MEDICINE

## 2021-09-29 PROCEDURE — G8432 DEP SCR NOT DOC, RNG: HCPCS | Performed by: INTERNAL MEDICINE

## 2021-09-29 PROCEDURE — G0463 HOSPITAL OUTPT CLINIC VISIT: HCPCS | Performed by: INTERNAL MEDICINE

## 2021-09-29 PROCEDURE — G8399 PT W/DXA RESULTS DOCUMENT: HCPCS | Performed by: INTERNAL MEDICINE

## 2021-09-29 PROCEDURE — G8417 CALC BMI ABV UP PARAM F/U: HCPCS | Performed by: INTERNAL MEDICINE

## 2021-09-29 PROCEDURE — G8536 NO DOC ELDER MAL SCRN: HCPCS | Performed by: INTERNAL MEDICINE

## 2021-09-29 PROCEDURE — G8756 NO BP MEASURE DOC: HCPCS | Performed by: INTERNAL MEDICINE

## 2021-09-29 RX ORDER — LEVOFLOXACIN 750 MG/1
750 TABLET ORAL DAILY
Qty: 5 TABLET | Refills: 0 | Status: SHIPPED | OUTPATIENT
Start: 2021-09-29 | End: 2021-10-04

## 2021-09-29 NOTE — PROGRESS NOTES
Consent: Christi Burciaga, who was seen by synchronous (real-time) audio-video technology, and/or her healthcare decision maker, is aware that this patient-initiated, Telehealth encounter on 9/29/2021 is a billable service, with coverage as determined by her insurance carrier. She is aware that she may receive a bill and has provided verbal consent to proceed: Yes. I was in the office while conducting this encounter. Patient identification was verified at the start of the visit: YES  This visit was done with doxy. me  The patient is located in [place] during this visit    Note   Chief Complaint   fever    Christi Burciaga is a 79 y.o. female     Accompanied by her  who provided some of the history    1. Bronchitis  -     levoFLOXacin (LEVAQUIN) 750 mg tablet; Take 1 Tablet by mouth daily for 5 days. , Normal, Disp-5 Tablet, R-0   Reports sinus congestion and drainage that started about 3 weeks ago. Since then, has experienced cough, muscle pain, fatigue, abdominal pain, nausea, vomiting, chest pain, and difficulty breathing. She was evaluated by patient [facility] on Saturday. CXR reportedly negative for pneumonia although  says they told him 'maybe something on the bottom.'  Bloodwork reportedly negative and Covid test was negative. She was prescribed Augmentin. Reports that she continues to have fevers after starting antibiotics. Also reporting her worsening ankylosing spondylitis pain that is causing some brain fog and difficulty sleeping. Has not had as much of an appetite. Reports some shortness of breath with moving around but they report this is not unusual when her ankylosing spondylitis is worse. Her rheumatologist is aware of her current symptoms per patient. Given continued fevers, recommend switching to levofloxacin. Advised that if she does not improve over the next 48 hours, recommend ER.     We discussed the expected course, resolution and complications of the diagnosis(es) in detail. Medication risks, benefits, costs, interactions, and alternatives were discussed as indicated. I advised her to contact the office if her condition worsens, changes or fails to improve as anticipated. She expressed understanding with the diagnosis(es) and plan. Return to clinic: As scheduled    Mckenzie Carey MD  [facility]  9/29/2021    Future Appointments   Date Time Provider Caitlin Kim   12/2/2021  2:00 PM Orlando Wetzel MD [facility] BS AMB        Objective   Vitals:       No flowsheet data found. Physical Exam  Constitutional:       Comments: Appears tired   Pulmonary:      Effort: No respiratory distress. Comments: Able to speak multiple sentences although does occasionally take deep breaths  Neurological:      Mental Status: She is alert. Current Outpatient Medications   Medication Sig    levoFLOXacin (LEVAQUIN) 750 mg tablet Take 1 Tablet by mouth daily for 5 days.  olmesartan-hydroCHLOROthiazide (BENICAR HCT) 40-12.5 mg per tablet Take 1 Tablet by mouth daily.  oxiconazole (OXISTAT) 1 % topical cream Apply  to affected area daily.  metFORMIN ER (GLUCOPHAGE XR) 500 mg tablet TAKE 2 TABLETS BY MOUTH EVERY DAY WITH DINNER    SITagliptin (Januvia) 100 mg tablet TAKE 1 TABLET BY MOUTH DAILY    sertraline (ZOLOFT) 50 mg tablet TAKE 1 TABLET BY MOUTH DAILY    traMADoL (ULTRAM) 50 mg tablet TAKE 1 TABLET BY MOUTH THREE TIMES DAILY AS NEEDED FOR PAIN    amLODIPine (NORVASC) 10 mg tablet Take 1 Tablet by mouth daily.  celecoxib (CELEBREX) 200 mg capsule Take 1 Cap by mouth daily.  folic acid (FOLVITE) 1 mg tablet TAKE 2 TABLETS BY MOUTH DAILY    methotrexate (RHEUMATREX) 2.5 mg tablet Every Thursday. 8 tabs    montelukast (SINGULAIR) 10 mg tablet TAKE 1 TABLET BY MOUTH EVERY DAY    atorvastatin (LIPITOR) 20 mg tablet Take 0.5 Tabs by mouth daily.     prednisoLONE acetate (PRED FORTE) 1 % ophthalmic suspension  (Patient not taking: Reported on 9/7/2021)    fexofenadine (ALLEGRA) 180 mg tablet Take 180 mg by mouth daily.  fluticasone (FLONASE SENSIMIST) 27.5 mcg/actuation nasal spray 2 Sprays by Both Nostrils route daily.  Blood-Glucose Meter (FREESTYLE LITE METER) monitoring kit Use to test blood sugar once a day. Dx: E11.65    glucose blood VI test strips (FREESTYLE LITE STRIPS) strip Use to test blood sugar once daily. Dx: E11.65    Lancets misc Use to test blood sugar once daily. Dx: E11.65    acetaminophen (TYLENOL) 325 mg tablet Take 325 mg by mouth as needed for Pain. (Patient not taking: Reported on 8/3/2021)    melatonin 5 mg cap capsule Take 5 mg by mouth nightly.  albuterol (PROVENTIL HFA, VENTOLIN HFA, PROAIR HFA) 90 mcg/actuation inhaler Take 1 Puff by inhalation every six (6) hours as needed. No current facility-administered medications for this visit. Eduardo Abrams is a 79 y.o. female being evaluated by a video visit encounter for concerns as above. A caregiver was present when appropriate. Due to this being a TeleHealth encounter (During [facility]- public health emergency), evaluation of the following organ systems was limited: Vitals/Constitutional/EENT/Resp/CV/GI//MS/Neuro/Skin/Heme-Lymph-Imm. Pursuant to the emergency declaration under the [address] [place] [address], [facility] waiver authority and the [employer] Act, this Virtual  Visit was conducted, with patient's (and/or legal guardian's) consent, to reduce the patient's risk of exposure to COVID-19 and provide necessary medical care. Services were provided through a video synchronous discussion virtually to substitute for in-person clinic visit.

## 2021-10-10 ENCOUNTER — PATIENT MESSAGE (OUTPATIENT)
Dept: INTERNAL MEDICINE CLINIC | Age: 71
End: 2021-10-10

## 2021-10-11 ENCOUNTER — HOSPITAL ENCOUNTER (OUTPATIENT)
Dept: GENERAL RADIOLOGY | Age: 71
Discharge: HOME | End: 2021-10-11
Payer: MEDICARE

## 2021-10-11 ENCOUNTER — OFFICE VISIT (OUTPATIENT)
Dept: INTERNAL MEDICINE CLINIC | Age: 71
End: 2021-10-11
Payer: MEDICARE

## 2021-10-11 VITALS
SYSTOLIC BLOOD PRESSURE: 140 MMHG | RESPIRATION RATE: 18 BRPM | HEART RATE: 94 BPM | BODY MASS INDEX: 29 KG/M2 | HEIGHT: 62 IN | WEIGHT: 157.6 LBS | OXYGEN SATURATION: 97 % | DIASTOLIC BLOOD PRESSURE: 80 MMHG

## 2021-10-11 DIAGNOSIS — R06.09 DOE (DYSPNEA ON EXERTION): ICD-10-CM

## 2021-10-11 DIAGNOSIS — F41.9 ANXIETY: ICD-10-CM

## 2021-10-11 DIAGNOSIS — R63.0 DECREASED APPETITE: ICD-10-CM

## 2021-10-11 DIAGNOSIS — R06.09 DOE (DYSPNEA ON EXERTION): Primary | ICD-10-CM

## 2021-10-11 DIAGNOSIS — M46.1 SACROILIITIS (HCC): ICD-10-CM

## 2021-10-11 PROCEDURE — G8427 DOCREV CUR MEDS BY ELIG CLIN: HCPCS | Performed by: INTERNAL MEDICINE

## 2021-10-11 PROCEDURE — G8417 CALC BMI ABV UP PARAM F/U: HCPCS | Performed by: INTERNAL MEDICINE

## 2021-10-11 PROCEDURE — G8536 NO DOC ELDER MAL SCRN: HCPCS | Performed by: INTERNAL MEDICINE

## 2021-10-11 PROCEDURE — 99214 OFFICE O/P EST MOD 30 MIN: CPT | Performed by: INTERNAL MEDICINE

## 2021-10-11 PROCEDURE — G8753 SYS BP > OR = 140: HCPCS | Performed by: INTERNAL MEDICINE

## 2021-10-11 PROCEDURE — G8754 DIAS BP LESS 90: HCPCS | Performed by: INTERNAL MEDICINE

## 2021-10-11 PROCEDURE — G8432 DEP SCR NOT DOC, RNG: HCPCS | Performed by: INTERNAL MEDICINE

## 2021-10-11 PROCEDURE — 71046 X-RAY EXAM CHEST 2 VIEWS: CPT

## 2021-10-11 PROCEDURE — G0463 HOSPITAL OUTPT CLINIC VISIT: HCPCS | Performed by: INTERNAL MEDICINE

## 2021-10-11 PROCEDURE — 1101F PT FALLS ASSESS-DOCD LE1/YR: CPT | Performed by: INTERNAL MEDICINE

## 2021-10-11 PROCEDURE — G8399 PT W/DXA RESULTS DOCUMENT: HCPCS | Performed by: INTERNAL MEDICINE

## 2021-10-11 PROCEDURE — 1090F PRES/ABSN URINE INCON ASSESS: CPT | Performed by: INTERNAL MEDICINE

## 2021-10-11 PROCEDURE — 3017F COLORECTAL CA SCREEN DOC REV: CPT | Performed by: INTERNAL MEDICINE

## 2021-10-11 RX ORDER — ALUMINUM ZIRCONIUM OCTACHLOROHYDREX GLY 16 G/100G
GEL TOPICAL
COMMUNITY
End: 2021-11-16 | Stop reason: ALTCHOICE

## 2021-10-11 RX ORDER — AMOXICILLIN AND CLAVULANATE POTASSIUM 875; 125 MG/1; MG/1
TABLET, FILM COATED ORAL
COMMUNITY
Start: 2021-09-25 | End: 2021-11-16 | Stop reason: ALTCHOICE

## 2021-10-11 RX ORDER — SERTRALINE HYDROCHLORIDE 100 MG/1
100 TABLET, FILM COATED ORAL DAILY
Qty: 30 TABLET | Refills: 1 | Status: SHIPPED | OUTPATIENT
Start: 2021-10-11 | End: 2022-01-11

## 2021-10-11 NOTE — PROGRESS NOTES
Cally Portillo  Identified pt with two pt identifiers(name and ). Chief Complaint   Patient presents with    Sinus Infection     pt is presenting today with sinus infection headache bloody discharge from sinus, has been running a fever over 100 at times, haing difficultly breathing was tested last Saturday for covid (-) has was treated with ABX x2 with no relief; has no appetite is having difficulty urination        1. Have you been to the ER, urgent care clinic since your last visit? Hospitalized since your last visit? NO    2. Have you seen or consulted any other health care providers outside of the [address] [place] [address] since your last visit? Include any pap smears or colon screening. NO      Provider notified of reason for visit, vitals and flowsheets obtained on patients.      Patient received paperwork for advance directive during previous visit but has not completed at this time     Reviewed record In preparation for visit, huddled with provider and have obtained necessary documentation      Health Maintenance Due   Topic    Eye Exam Retinal or Dilated     Shingrix Vaccine Age 49> (1 of 2)    Pneumococcal 65+ years (2 of 2 - PPSV23)    Medicare Yearly Exam     Foot Exam Q1     Breast Cancer Screen Mammogram     Flu Vaccine (1)    MICROALBUMIN Q1        Wt Readings from Last 3 Encounters:   10/11/21 157 lb 9.6 oz (71.5 kg)   21 171 lb 6.4 oz (77.7 kg)   21 174 lb (78.9 kg)     Temp Readings from Last 3 Encounters:   21 97.6 °F (36.4 °C)   21 98.6 °F (37 °C) (Oral)   21 98.9 °F (37.2 °C)     BP Readings from Last 3 Encounters:   10/11/21 (!) 140/80   21 (!) 156/74   21 107/70     Pulse Readings from Last 3 Encounters:   10/11/21 94   21 100   21 92     Vitals:    10/11/21 1402   BP: (!) 140/80   Pulse: 94   Resp: 18   SpO2: 97%   Weight: 157 lb 9.6 oz (71.5 kg)   Height: 5' 2\" (1.575 m)   PainSc:   7   PainLoc: Back         Learning Assessment:  :     Learning Assessment 8/6/2014   PRIMARY LEARNER Patient   PRIMARY LANGUAGE ENGLISH   LEARNER PREFERENCE PRIMARY DEMONSTRATION   ANSWERED BY patient   RELATIONSHIP SELF       Depression Screening:  :     3 most recent PHQ Screens 4/29/2021   Little interest or pleasure in doing things Not at all   Feeling down, depressed, irritable, or hopeless Several days   Total Score PHQ 2 1       Fall Risk Assessment:  :     Fall Risk Assessment, last 12 mths 4/29/2021   Able to walk? Yes   Fall in past 12 months? 0   Do you feel unsteady? 0   Are you worried about falling 0   Number of falls in past 12 months -   Fall with injury? -       Abuse Screening:  :     Abuse Screening Questionnaire 11/6/2019 6/6/2018 8/15/2017   Do you ever feel afraid of your partner? N N N   Are you in a relationship with someone who physically or mentally threatens you? N N N   Is it safe for you to go home? Y Y Y       ADL Screening:  :     ADL Assessment 8/15/2017   Feeding yourself No Help Needed   Getting from bed to chair No Help Needed   Getting dressed No Help Needed   Bathing or showering No Help Needed   Walk across the room (includes cane/walker) No Help Needed   Using the telphone No Help Needed   Taking your medications No Help Needed   Preparing meals No Help Needed   Managing money (expenses/bills) No Help Needed   Moderately strenuous housework (laundry) No Help Needed   Shopping for personal items (toiletries/medicines) No Help Needed   Shopping for groceries No Help Needed   Driving No Help Needed   Climbing a flight of stairs No Help Needed   Getting to places beyond walking distances No Help Needed         Medication reconciliation up to date and corrected with patient at this time.

## 2021-10-11 NOTE — PROGRESS NOTES
HISTORY OF PRESENT ILLNESS  Nader Moore is a 79 y.o. female. CHRISTIANO Lehman is seen with her . They have summarized her symptoms. September 24th she was feeling tired, congested, had a negative COVID test.  She went to [facility] on September 25th with temperature of 101.2. PCR COVID test was negative. Chest x-ray was negative. White count was normal.  She was told she had sinusitis and was given Augmentin b.i.d. She had a virtual visit with  _______________ (inaudible) on September 29th and was switched to Levaquin 750 mg for five days. Finished on October 4th. She does feel improved in that her fever has resolved and she is not having as much congestion. However, she is tired, she gets easily dyspneic with exertion, her appetite is down and she is not sleeping well. She notes her urination seems to be diminished and her stools are smaller. She is having some restless legs and generally feels tired. Admits to some anxiety. Has chest wall pain, but not pain with exertion. Has been off Methotrexate during this illness. Review of Systems   Constitutional: Positive for malaise/fatigue. Negative for chills, fever and weight loss. HENT: Negative for congestion. Respiratory: Positive for shortness of breath. Negative for cough and wheezing. Cardiovascular: Negative for chest pain, palpitations, orthopnea, leg swelling and PND. Gastrointestinal: Positive for constipation. Negative for abdominal pain, heartburn, nausea and vomiting. Genitourinary: Negative for dysuria. Musculoskeletal: Negative for myalgias. Neurological: Negative for dizziness and headaches. Psychiatric/Behavioral: Positive for depression. The patient is nervous/anxious and has insomnia. Physical Exam  Vitals and nursing note reviewed. Constitutional:       Appearance: She is well-developed. HENT:      Head: Normocephalic.       Right Ear: Tympanic membrane, ear canal and external ear normal. Left Ear: Tympanic membrane, ear canal and external ear normal.      Nose: Nose normal.      Mouth/Throat:      Pharynx: No oropharyngeal exudate. Eyes:      General:         Right eye: No discharge. Left eye: No discharge. Conjunctiva/sclera: Conjunctivae normal.      Pupils: Pupils are equal, round, and reactive to light. Cardiovascular:      Rate and Rhythm: Normal rate and regular rhythm. Heart sounds: Normal heart sounds. Pulmonary:      Effort: Pulmonary effort is normal. No respiratory distress. Breath sounds: Normal breath sounds. No wheezing or rales. Musculoskeletal:      Cervical back: Normal range of motion and neck supple. Comments: Using a rolling walker   Lymphadenopathy:      Cervical: No cervical adenopathy. ASSESSMENT and PLAN  Diagnoses and all orders for this visit:    1. POOL (dyspnea on exertion)-suspect viral illness since sept and cxr and covid tests neg  Discussed she is slowly improving and I suspect may contt to take weeks to feel better  R/o pe and ro pneumonia-     XR CHEST PA LAT; Future  -     CBC WITH AUTOMATED DIFF; Future  -     D DIMER; Future    2. Anxiety-contributing inc dose  -     sertraline (ZOLOFT) 100 mg tablet; Take 1 Tablet by mouth daily. 3. Sacroiliitis (HCC)-encouraged resume the mtx  appt in 1mo    4.  Decreased appetite

## 2021-10-11 NOTE — TELEPHONE ENCOUNTER
From: Rylan Lenz  To: Romi Conti MD  Sent: 10/10/2021 11:18 AM EDT  Subject: Visit Follow-Up Question    Just a quick update on developing sinus infection. Week 8. Continuing low grade fever, bloody. nasal drainage. Have completed several antibiotics. Constipated, weak urine stream but no pain. Ability to walk greatly diminished lower extremity pains 7. Sinus headache constant and tremors in hands. Restless legs! Diminished memory and recall. This is some sinus infection. Both short and long term covid negative. Please advise. Thanks.  Matt Torres

## 2021-10-12 DIAGNOSIS — R06.09 DOE (DYSPNEA ON EXERTION): Primary | ICD-10-CM

## 2021-10-12 LAB
BASOPHILS # BLD: 0 K/UL (ref 0–0.1)
BASOPHILS NFR BLD: 0 % (ref 0–1)
D DIMER PPP FEU-MCNC: 0.86 MG/L FEU (ref 0–0.65)
DIFFERENTIAL METHOD BLD: ABNORMAL
EOSINOPHIL # BLD: 0.1 K/UL (ref 0–0.4)
EOSINOPHIL NFR BLD: 1 % (ref 0–7)
ERYTHROCYTE [DISTWIDTH] IN BLOOD BY AUTOMATED COUNT: 14 % (ref 11.5–14.5)
HCT VFR BLD AUTO: 39.2 % (ref 35–47)
HGB BLD-MCNC: 12.4 G/DL (ref 11.5–16)
IMM GRANULOCYTES # BLD AUTO: 0 K/UL
IMM GRANULOCYTES NFR BLD AUTO: 0 %
LYMPHOCYTES # BLD: 6.4 K/UL (ref 0.8–3.5)
LYMPHOCYTES NFR BLD: 50 % (ref 12–49)
MCH RBC QN AUTO: 31.6 PG (ref 26–34)
MCHC RBC AUTO-ENTMCNC: 31.6 G/DL (ref 30–36.5)
MCV RBC AUTO: 100 FL (ref 80–99)
MONOCYTES # BLD: 0.9 K/UL (ref 0–1)
MONOCYTES NFR BLD: 7 % (ref 5–13)
NEUTS SEG # BLD: 5.3 K/UL (ref 1.8–8)
NEUTS SEG NFR BLD: 42 % (ref 32–75)
NRBC # BLD: 0 K/UL (ref 0–0.01)
NRBC BLD-RTO: 0 PER 100 WBC
PLATELET # BLD AUTO: 356 K/UL (ref 150–400)
PMV BLD AUTO: 10.2 FL (ref 8.9–12.9)
RBC # BLD AUTO: 3.92 M/UL (ref 3.8–5.2)
RBC MORPH BLD: ABNORMAL
WBC # BLD AUTO: 12.7 K/UL (ref 3.6–11)
WBC MORPH BLD: ABNORMAL

## 2021-10-12 NOTE — PROGRESS NOTES
Patient notified of abnormal D-dimer results & PCP's advice to do a stat CTA to r/o blood clots. Reassured the d-dimer is a screening tool but when abnormal needs follow up. Advised wbc is consistent with a viral infection.

## 2021-10-12 NOTE — PROGRESS NOTES
Please notify her that I ordered a stat cta to  look at lungs for clots given the abnormal d dimer-this does not mean she has a clot but is a screening lab and if abnormal we should do further testinig with the cta   Wbc consistent with viral infection

## 2021-10-13 ENCOUNTER — TELEPHONE (OUTPATIENT)
Dept: INTERNAL MEDICINE CLINIC | Age: 71
End: 2021-10-13

## 2021-10-13 ENCOUNTER — HOSPITAL ENCOUNTER (OUTPATIENT)
Dept: CT IMAGING | Age: 71
Discharge: HOME | End: 2021-10-13
Attending: INTERNAL MEDICINE
Payer: MEDICARE

## 2021-10-13 DIAGNOSIS — R06.09 DOE (DYSPNEA ON EXERTION): ICD-10-CM

## 2021-10-13 PROCEDURE — 71275 CT ANGIOGRAPHY CHEST: CPT

## 2021-10-13 PROCEDURE — 74011000636 HC RX REV CODE- 636: Performed by: INTERNAL MEDICINE

## 2021-10-13 RX ADMIN — IOPAMIDOL 80 ML: 755 INJECTION, SOLUTION INTRAVENOUS at 19:16

## 2021-10-13 NOTE — TELEPHONE ENCOUNTER
Returned call to patient's . Explained the reason why the CTA was ordered & provided the number to the stat scheduling dept so the test can get scheduled.  voiced understanding.

## 2021-10-13 NOTE — TELEPHONE ENCOUNTER
Patients  was calling to say he was told by Dr Hilda Cooks that we would reach out to have patient scheduled for CAT scan or MRI [unsure which one]. I told patient that we normally do not do this but he insisted we do do it, I did give patient the number to schedule himself. I also do not see any new orders in patients chart for either. Patient insists that nurse call back when available.

## 2021-10-14 NOTE — PROGRESS NOTES
Detailed v/m left for patient notifying her CTA was neg for a PE & other acute lung findings. Reassured this is a good report.
Please notify no pe or acute lung findings this is good news  Does  have gallstones not causing acute symptoms  This is a reassuring scan
none

## 2021-10-16 DIAGNOSIS — E11.65 TYPE 2 DIABETES MELLITUS WITH HYPERGLYCEMIA, WITHOUT LONG-TERM CURRENT USE OF INSULIN (HCC): ICD-10-CM

## 2021-11-16 ENCOUNTER — OFFICE VISIT (OUTPATIENT)
Dept: INTERNAL MEDICINE CLINIC | Age: 71
End: 2021-11-16
Payer: MEDICARE

## 2021-11-16 VITALS
RESPIRATION RATE: 16 BRPM | HEART RATE: 88 BPM | SYSTOLIC BLOOD PRESSURE: 101 MMHG | TEMPERATURE: 98.4 F | DIASTOLIC BLOOD PRESSURE: 71 MMHG | OXYGEN SATURATION: 96 % | HEIGHT: 62 IN | WEIGHT: 156.2 LBS | BODY MASS INDEX: 28.74 KG/M2

## 2021-11-16 DIAGNOSIS — R06.09 DOE (DYSPNEA ON EXERTION): Primary | ICD-10-CM

## 2021-11-16 DIAGNOSIS — M45.0 ANKYLOSING SPONDYLITIS OF MULTIPLE SITES IN SPINE (HCC): ICD-10-CM

## 2021-11-16 DIAGNOSIS — F41.9 ANXIETY: ICD-10-CM

## 2021-11-16 DIAGNOSIS — K80.50 CALCULUS OF BILE DUCT WITHOUT CHOLECYSTITIS AND WITHOUT OBSTRUCTION: ICD-10-CM

## 2021-11-16 PROCEDURE — 3017F COLORECTAL CA SCREEN DOC REV: CPT | Performed by: INTERNAL MEDICINE

## 2021-11-16 PROCEDURE — G8510 SCR DEP NEG, NO PLAN REQD: HCPCS | Performed by: INTERNAL MEDICINE

## 2021-11-16 PROCEDURE — 99214 OFFICE O/P EST MOD 30 MIN: CPT | Performed by: INTERNAL MEDICINE

## 2021-11-16 PROCEDURE — 1101F PT FALLS ASSESS-DOCD LE1/YR: CPT | Performed by: INTERNAL MEDICINE

## 2021-11-16 PROCEDURE — G8399 PT W/DXA RESULTS DOCUMENT: HCPCS | Performed by: INTERNAL MEDICINE

## 2021-11-16 PROCEDURE — G8427 DOCREV CUR MEDS BY ELIG CLIN: HCPCS | Performed by: INTERNAL MEDICINE

## 2021-11-16 PROCEDURE — 1090F PRES/ABSN URINE INCON ASSESS: CPT | Performed by: INTERNAL MEDICINE

## 2021-11-16 PROCEDURE — G8754 DIAS BP LESS 90: HCPCS | Performed by: INTERNAL MEDICINE

## 2021-11-16 PROCEDURE — G8417 CALC BMI ABV UP PARAM F/U: HCPCS | Performed by: INTERNAL MEDICINE

## 2021-11-16 PROCEDURE — G8536 NO DOC ELDER MAL SCRN: HCPCS | Performed by: INTERNAL MEDICINE

## 2021-11-16 PROCEDURE — G0463 HOSPITAL OUTPT CLINIC VISIT: HCPCS | Performed by: INTERNAL MEDICINE

## 2021-11-16 PROCEDURE — G8752 SYS BP LESS 140: HCPCS | Performed by: INTERNAL MEDICINE

## 2021-11-16 NOTE — PROGRESS NOTES
HISTORY OF PRESENT ILLNESS  Akash Ramírez is a 79 y.o. female. HPI  One month follow up. She is doing better. Her dyspnea on exertion has resolved. CTA fortunately showed no lung abnormalities. I believe she had a viral illness. Anxiety has improved with the increased Sertraline from 50 to 100. She asks if it could be causing memory issues. I think this is unlikely and I am suggesting neuropsych testing. No other side effects noted. Hypertension, controlled on Olmesartan/HCTZ and Amlodipine. Rheumatologic with sacroiliitis and ankylosing spondylitis. Sees Dr. Sondra Merino in two months. Back on Methotrexate and doing better, but considering physical therapy. Review of Systems   Constitutional: Negative for chills, fever and weight loss. Respiratory: Negative for cough, shortness of breath and wheezing. Cardiovascular: Negative for chest pain, palpitations, orthopnea, leg swelling and PND. Gastrointestinal: Negative for heartburn and nausea. Musculoskeletal: Positive for back pain. Negative for myalgias. Neurological: Negative for dizziness and headaches. Psychiatric/Behavioral: Positive for memory loss. Negative for depression. The patient is not nervous/anxious. Physical Exam  Vitals and nursing note reviewed. Constitutional:       Appearance: She is well-developed. HENT:      Head: Normocephalic and atraumatic. Neck:      Thyroid: No thyromegaly. Vascular: No carotid bruit. Cardiovascular:      Rate and Rhythm: Normal rate and regular rhythm. Heart sounds: Normal heart sounds, S1 normal and S2 normal. No murmur heard. Pulmonary:      Effort: Pulmonary effort is normal. No respiratory distress. Breath sounds: Normal breath sounds. No wheezing or rales. Musculoskeletal:      Cervical back: Normal range of motion and neck supple.       Comments: Walks with a cane   Neurological:      Mental Status: She is alert and oriented to person, place, and time.   Psychiatric:         Behavior: Behavior normal.         ASSESSMENT and PLAN  Diagnoses and all orders for this visit:    1. POOL (dyspnea on exertion)-improved suspect had a viral illness now improved    2. Anxiety-better on zoloft 100    3. Calculus of bile duct without cholecystitis and without obstruction-discussed sxs of active disease currently asymptomatic  4.  Ankylosing spondylitis-cont with dr bernardo casanova in 3 mo

## 2021-12-02 ENCOUNTER — OFFICE VISIT (OUTPATIENT)
Dept: INTERNAL MEDICINE CLINIC | Age: 71
End: 2021-12-02
Payer: MEDICARE

## 2021-12-02 VITALS
OXYGEN SATURATION: 96 % | HEART RATE: 96 BPM | SYSTOLIC BLOOD PRESSURE: 103 MMHG | BODY MASS INDEX: 29.96 KG/M2 | HEIGHT: 62 IN | RESPIRATION RATE: 16 BRPM | DIASTOLIC BLOOD PRESSURE: 70 MMHG | WEIGHT: 162.8 LBS | TEMPERATURE: 98.4 F

## 2021-12-02 DIAGNOSIS — Z00.00 MEDICARE ANNUAL WELLNESS VISIT, SUBSEQUENT: Primary | ICD-10-CM

## 2021-12-02 DIAGNOSIS — Z12.31 BREAST CANCER SCREENING BY MAMMOGRAM: ICD-10-CM

## 2021-12-02 DIAGNOSIS — Z78.0 POSTMENOPAUSAL: ICD-10-CM

## 2021-12-02 DIAGNOSIS — Z23 ENCOUNTER FOR IMMUNIZATION: ICD-10-CM

## 2021-12-02 PROCEDURE — G8417 CALC BMI ABV UP PARAM F/U: HCPCS | Performed by: INTERNAL MEDICINE

## 2021-12-02 PROCEDURE — G0439 PPPS, SUBSEQ VISIT: HCPCS | Performed by: INTERNAL MEDICINE

## 2021-12-02 PROCEDURE — 1101F PT FALLS ASSESS-DOCD LE1/YR: CPT | Performed by: INTERNAL MEDICINE

## 2021-12-02 PROCEDURE — G9899 SCRN MAM PERF RSLTS DOC: HCPCS | Performed by: INTERNAL MEDICINE

## 2021-12-02 PROCEDURE — G8536 NO DOC ELDER MAL SCRN: HCPCS | Performed by: INTERNAL MEDICINE

## 2021-12-02 PROCEDURE — G8510 SCR DEP NEG, NO PLAN REQD: HCPCS | Performed by: INTERNAL MEDICINE

## 2021-12-02 PROCEDURE — G8752 SYS BP LESS 140: HCPCS | Performed by: INTERNAL MEDICINE

## 2021-12-02 PROCEDURE — 90732 PPSV23 VACC 2 YRS+ SUBQ/IM: CPT | Performed by: INTERNAL MEDICINE

## 2021-12-02 PROCEDURE — G8754 DIAS BP LESS 90: HCPCS | Performed by: INTERNAL MEDICINE

## 2021-12-02 PROCEDURE — G8427 DOCREV CUR MEDS BY ELIG CLIN: HCPCS | Performed by: INTERNAL MEDICINE

## 2021-12-02 PROCEDURE — 3017F COLORECTAL CA SCREEN DOC REV: CPT | Performed by: INTERNAL MEDICINE

## 2021-12-02 PROCEDURE — G8399 PT W/DXA RESULTS DOCUMENT: HCPCS | Performed by: INTERNAL MEDICINE

## 2021-12-02 NOTE — PROGRESS NOTES
This is the Subsequent Medicare Annual Wellness Exam, performed 12 months or more after the Initial AWV or the last Subsequent AWV    I have reviewed the patient's medical history in detail and updated the computerized patient record. Assessment/Plan   Education and counseling provided:  Are appropriate based on today's review and evaluation  Pneumococcal Vaccine  Influenza Vaccine  Screening Mammography  Colorectal cancer screening tests  Bone mass measurement (DEXA)  Screening for glaucoma    1. Medicare annual wellness visit, subsequent  2. Encounter for immunization  -     PNEUMOCOCCAL POLYSACCHARIDE VACCINE, 23-VALENT, ADULT OR IMMUNOSUPPRESSED PT DOSE,  3. Postmenopausal  -     DEXA BONE DENSITY STUDY AXIAL; Future  4. Breast cancer screening by mammogram  -     [facility] 3D DENISE W MAMMO BI SCREENING INCL CAD; Future       Depression Risk Factor Screening     3 most recent PHQ Screens 12/2/2021   Little interest or pleasure in doing things Not at all   Feeling down, depressed, irritable, or hopeless Not at all   Total Score PHQ 2 0       Alcohol Risk Screen    Do you average more than 1 drink per night or more than 7 drinks a week:  No    On any one occasion in the past three months have you have had more than 3 drinks containing alcohol:  No        Functional Ability and Level of Safety    Hearing: Hearing is good. Activities of Daily Living: The home contains: no safety equipment. Patient does total self care      Ambulation: with mild difficulty     Fall Risk:  Fall Risk Assessment, last 12 mths 4/29/2021   Able to walk? Yes   Fall in past 12 months? 0   Do you feel unsteady? 0   Are you worried about falling 0   Number of falls in past 12 months -   Fall with injury?  -      Abuse Screen:  Patient is not abused       Cognitive Screening    Has your family/caregiver stated any concerns about your memory: no     Cognitive Screening: Normal - clock drawing, recall of  3 objects, world backwards, serial sevens some trouble but able to complete, minicog did well    Health Maintenance Due     Health Maintenance Due   Topic Date Due    Eye Exam Retinal or Dilated  Never done    Shingrix Vaccine Age 50> (1 of 2) Never done    Medicare Yearly Exam  05/21/2020    Foot Exam Q1  05/21/2020    Breast Cancer Screen Mammogram  03/22/2021    MICROALBUMIN Q1  10/20/2021       Patient Care Team   Patient Care Team:  Rhonda Linares MD as PCP - General  Rhonda Linares MD as PCP - [facility] Empaneled Provider    History     Patient Active Problem List   Diagnosis Code    S/P partial hysterectomy Z90.711    Hypercholesterolemia E78.00    Iritis H20.9    TMJ (dislocation of temporomandibular joint) S03. 00XA    Sacroiliitis ([address] M46.1    Essential hypertension, benign I10    First degree atrioventricular block I44.0    Ankylosing spondylitis (HCC) M45.9    Spinal stenosis, lumbar M48.061    Primary osteoarthritis of right knee M17.11    Obesity (BMI 30.0-34. 9) E66.9    Type 2 diabetes mellitus with hyperglycemia, without long-term current use of insulin (HCC) E11.65    Advanced care planning/counseling discussion Z71.89    Hx of colonoscopy Z98.890     Past Medical History:   Diagnosis Date    [facility]ylosing [facility]ondylitis ([facility]) 2/8/2012    Asthma     Autoimmune disease (HCC)     ankylosing spondylitis    Chronic pain     spondylitis    Diabetes ([address]     Essential hypertension, benign     First degree atrioventricular block     GERD (gastroesophageal reflux disease)     Hx of colonoscopy 9/26/2019 7/19 dr Mark Hobson neg biopsy for colitis    Hypercholesteremia     Ill-defined condition     has a rib cage thus does not always breathe deep    Ill-defined condition     diarrhea x 5 months    Iritis 1/15/2009    PUD (peptic ulcer disease)     HX ULCERS 1970'S    S/P partial hysterectomy     Sacroiliitis ([address] 9/15/2010    Seizures ([address] CHILD    HX SEIZURES CHILD (NOT CURRENT PROBLEM)    Spinal stenosis, lumbar 5/29/14    mri tamiko at l4-l5    TMJ (dislocation of temporomandibular joint) 9/4/2009    Unspecified adverse effect of anesthesia     woke up durning  cataract and hysterectomy       Past Surgical History:   Procedure Laterality Date    COLONOSCOPY N/A 7/15/2019    COLONOSCOPY performed by Leigh Ann Kitchen MD at [facility]. Lauren 79, COLON, DIAGNOSTIC  1/01/07    negative     HX BREAST BIOPSY Right 1990's    benign    HX CATARACT REMOVAL  1992    bilateral  / lens implant    HX GYN  9/28/13    bilat oophorectomy    HX HYSTERECTOMY  2008    HX KNEE ARTHROSCOPY  2003    right    HX ORTHOPAEDIC  8/6/15    LUMBAR LAMINECTOMY L3-4 dr Alatorre Peer    menicusectomy    HX TONSILLECTOMY  1958    IR INJ FORAMIN EPID LUMB ANES/STER SNGL  7/6/2021     Current Outpatient Medications   Medication Sig Dispense Refill    SITagliptin (Januvia) 100 mg tablet TAKE 1 TABLET BY MOUTH DAILY 90 Tablet 0    sertraline (ZOLOFT) 100 mg tablet Take 1 Tablet by mouth daily. 30 Tablet 1    olmesartan-hydroCHLOROthiazide (BENICAR HCT) 40-12.5 mg per tablet Take 1 Tablet by mouth daily. 90 Tablet 1    oxiconazole (OXISTAT) 1 % topical cream Apply  to affected area daily. 15 g 0    metFORMIN ER (GLUCOPHAGE XR) 500 mg tablet TAKE 2 TABLETS BY MOUTH EVERY DAY WITH DINNER 180 Tablet 1    traMADoL (ULTRAM) 50 mg tablet TAKE 1 TABLET BY MOUTH THREE TIMES DAILY AS NEEDED FOR PAIN      amLODIPine (NORVASC) 10 mg tablet Take 1 Tablet by mouth daily. 30 Tablet 5    folic acid (FOLVITE) 1 mg tablet TAKE 2 TABLETS BY MOUTH DAILY      methotrexate (RHEUMATREX) 2.5 mg tablet Every Thursday. 8 tabs      montelukast (SINGULAIR) 10 mg tablet TAKE 1 TABLET BY MOUTH EVERY DAY 90 Tab 11    atorvastatin (LIPITOR) 20 mg tablet Take 0.5 Tabs by mouth daily. 90 Tab 1    fexofenadine (ALLEGRA) 180 mg tablet Take 180 mg by mouth daily.       fluticasone (FLONASE SENSIMIST) 27.5 mcg/actuation nasal spray 2 Sprays by Both Nostrils route daily.  Blood-Glucose Meter (FREESTYLE LITE METER) monitoring kit Use to test blood sugar once a day. Dx: E11.65 1 Kit 0    glucose blood VI test strips (FREESTYLE LITE STRIPS) strip Use to test blood sugar once daily. Dx: E11.65 100 Strip 3    Lancets misc Use to test blood sugar once daily. Dx: E11.65 100 Each 3    melatonin 5 mg cap capsule Take 5 mg by mouth nightly.  albuterol (PROVENTIL HFA, VENTOLIN HFA, PROAIR HFA) 90 mcg/actuation inhaler Take 1 Puff by inhalation every six (6) hours as needed.  acetaminophen (TYLENOL) 325 mg tablet Take 325 mg by mouth as needed for Pain.  (Patient not taking: Reported on 12/2/2021)       Allergies   Allergen Reactions    Latex Swelling     SWELLING TONGUE, ORAL CAVITY    Benadryl [Diphenhydramine Hcl] Other (comments)     Raises heart rate    Atropine Swelling     Eye swelling    Chocolate [Cocoa] Rash    Compazine [Prochlorperazine Edisylate] Other (comments)     Muscle spasm, POSSIBLE SEIZURES    Cymbalta [Duloxetine] Vertigo    Diclofenac Sodium (Bulk) Rash    Milk Rash    Phenobarbital Other (comments)     tachy       Family History   Problem Relation Age of Onset    Diabetes Mother     Heart Disease Mother     Hypertension Father     Cancer Father         PAROTID, LUNG METS    Heart Disease Brother     Pneumonia Brother     Other Brother         autoimmune disease    Anesth Problems Neg Hx      Social History     Tobacco Use    Smoking status: Never Smoker    Smokeless tobacco: Never Used   Substance Use Topics    Alcohol use: Not Currently         Sho Anna MD

## 2021-12-02 NOTE — PATIENT INSTRUCTIONS
Medicare Wellness Visit, Female     The best way to live healthy is to have a lifestyle where you eat a well-balanced diet, exercise regularly, limit alcohol use, and quit all forms of tobacco/nicotine, if applicable. Regular preventive services are another way to keep healthy. Preventive services (vaccines, screening tests, monitoring & exams) can help personalize your care plan, which helps you manage your own care. Screening tests can find health problems at the earliest stages, when they are easiest to treat. [facility] follows the current, evidence-based guidelines published by the [facility] ([facility]) when recommending preventive services for our patients. Because we follow these guidelines, sometimes recommendations change over time as research supports it. (For example, mammograms used to be recommended annually. Even though Medicare will still pay for an annual mammogram, the newer guidelines recommend a mammogram every two years for women of average risk). Of course, you and your doctor may decide to screen more often for some diseases, based on your risk and your co-morbidities (chronic disease you are already diagnosed with). Preventive services for you include:  - Medicare offers their members a free annual wellness visit, which is time for you and your primary care provider to discuss and plan for your preventive service needs. Take advantage of this benefit every year!  -All adults over the age of 72 should receive the recommended pneumonia vaccines. Current USPSTF guidelines recommend a series of two vaccines for the best pneumonia protection.   -All adults should have a flu vaccine yearly and a tetanus vaccine every 10 years.   -All adults age 48 and older should receive the shingles vaccines (series of two vaccines).       -All adults age 38-68 who are overweight should have a diabetes screening test once every three years.   -All adults born between [birth year] and 1965 should be screened once for Hepatitis C.  -Other screening tests and preventive services for persons with diabetes include: an eye exam to screen for diabetic retinopathy, a kidney function test, a foot exam, and stricter control over your cholesterol.   -Cardiovascular screening for adults with routine risk involves an electrocardiogram (ECG) at intervals determined by your doctor.   -Colorectal cancer screenings should be done for adults age 54-65 with no increased risk factors for colorectal cancer. There are a number of acceptable methods of screening for this type of cancer. Each test has its own benefits and drawbacks. Discuss with your doctor what is most appropriate for you during your annual wellness visit. The different tests include: colonoscopy (considered the best screening method), a fecal occult blood test, a fecal DNA test, and sigmoidoscopy.    -A bone mass density test is recommended when a woman turns 65 to screen for osteoporosis. This test is only recommended one time, as a screening. Some providers will use this same test as a disease monitoring tool if you already have osteoporosis. -Breast cancer screenings are recommended every other year for women of normal risk, age 54-69.  -Cervical cancer screenings for women over age 72 are only recommended with certain risk factors.      Here is a list of your current Health Maintenance items (your personalized list of preventive services) with a due date:  Health Maintenance Due   Topic Date Due    Eye Exam  Never done    Shingles Vaccine (1 of 2) Never done    Annual Well Visit  05/21/2020    Diabetic Foot Care  05/21/2020    Mammogram  03/22/2021    Albumin Urine Test  10/20/2021

## 2021-12-06 DIAGNOSIS — I10 ESSENTIAL HYPERTENSION WITH GOAL BLOOD PRESSURE LESS THAN 130/80: ICD-10-CM

## 2021-12-07 RX ORDER — AMLODIPINE BESYLATE 10 MG/1
10 TABLET ORAL DAILY
Qty: 30 TABLET | Refills: 5 | Status: SHIPPED | OUTPATIENT
Start: 2021-12-07 | End: 2022-02-01 | Stop reason: SDUPTHER

## 2022-01-03 RX ORDER — DOXYCYCLINE 100 MG/1
100 TABLET ORAL 2 TIMES DAILY
Qty: 14 TABLET | Refills: 0 | Status: SHIPPED | OUTPATIENT
Start: 2022-01-03 | End: 2022-06-06

## 2022-01-10 DIAGNOSIS — F41.9 ANXIETY: ICD-10-CM

## 2022-01-11 RX ORDER — SERTRALINE HYDROCHLORIDE 100 MG/1
100 TABLET, FILM COATED ORAL DAILY
Qty: 30 TABLET | Refills: 5 | Status: SHIPPED | OUTPATIENT
Start: 2022-01-11 | End: 2022-08-01

## 2022-01-14 DIAGNOSIS — J06.9 RECURRENT URI (UPPER RESPIRATORY INFECTION): ICD-10-CM

## 2022-01-14 DIAGNOSIS — E78.5 DYSLIPIDEMIA, GOAL LDL BELOW 100: ICD-10-CM

## 2022-01-14 DIAGNOSIS — E11.65 TYPE 2 DIABETES MELLITUS WITH HYPERGLYCEMIA, WITHOUT LONG-TERM CURRENT USE OF INSULIN (HCC): ICD-10-CM

## 2022-01-14 RX ORDER — METFORMIN HYDROCHLORIDE 500 MG/1
TABLET, EXTENDED RELEASE ORAL
Qty: 180 TABLET | Refills: 1 | Status: SHIPPED | OUTPATIENT
Start: 2022-01-14 | End: 2022-07-11

## 2022-01-14 RX ORDER — ATORVASTATIN CALCIUM 20 MG/1
TABLET, FILM COATED ORAL
Qty: 90 TABLET | Refills: 1 | Status: SHIPPED | OUTPATIENT
Start: 2022-01-14

## 2022-01-14 RX ORDER — MONTELUKAST SODIUM 10 MG/1
TABLET ORAL
Qty: 90 TABLET | Refills: 11 | Status: SHIPPED | OUTPATIENT
Start: 2022-01-14 | End: 2022-11-01 | Stop reason: ALTCHOICE

## 2022-02-01 DIAGNOSIS — I10 ESSENTIAL HYPERTENSION WITH GOAL BLOOD PRESSURE LESS THAN 130/80: ICD-10-CM

## 2022-02-01 RX ORDER — AMLODIPINE BESYLATE 10 MG/1
10 TABLET ORAL DAILY
Qty: 30 TABLET | Refills: 5 | Status: SHIPPED | OUTPATIENT
Start: 2022-02-01 | End: 2022-06-06 | Stop reason: DRUGHIGH

## 2022-02-01 NOTE — TELEPHONE ENCOUNTER
Patient's  called to state the pharmacy has lost or misplaced the patient's prescription and is asking for a new script to be sent in.

## 2022-02-11 ENCOUNTER — TRANSCRIBE ORDER (OUTPATIENT)
Dept: SCHEDULING | Age: 72
End: 2022-02-11

## 2022-02-11 DIAGNOSIS — Z12.31 SCREENING MAMMOGRAM FOR HIGH-RISK PATIENT: Primary | ICD-10-CM

## 2022-03-19 PROBLEM — Z71.89 ADVANCED CARE PLANNING/COUNSELING DISCUSSION: Status: ACTIVE | Noted: 2017-08-15

## 2022-03-19 PROBLEM — E11.65 TYPE 2 DIABETES MELLITUS WITH HYPERGLYCEMIA, WITHOUT LONG-TERM CURRENT USE OF INSULIN (HCC): Status: ACTIVE | Noted: 2017-08-14

## 2022-03-20 PROBLEM — M17.11 PRIMARY OSTEOARTHRITIS OF RIGHT KNEE: Status: ACTIVE | Noted: 2017-04-11

## 2022-03-20 PROBLEM — E66.9 OBESITY (BMI 30.0-34.9): Status: ACTIVE | Noted: 2017-04-11

## 2022-03-20 PROBLEM — Z98.890 HX OF COLONOSCOPY: Status: ACTIVE | Noted: 2019-09-26

## 2022-03-23 ENCOUNTER — HOSPITAL ENCOUNTER (OUTPATIENT)
Dept: MAMMOGRAPHY | Age: 72
Discharge: HOME | End: 2022-03-23
Attending: INTERNAL MEDICINE
Payer: MEDICARE

## 2022-03-23 DIAGNOSIS — Z12.31 SCREENING MAMMOGRAM FOR HIGH-RISK PATIENT: ICD-10-CM

## 2022-03-23 PROCEDURE — 77063 BREAST TOMOSYNTHESIS BI: CPT

## 2022-04-29 DIAGNOSIS — E11.65 TYPE 2 DIABETES MELLITUS WITH HYPERGLYCEMIA, WITHOUT LONG-TERM CURRENT USE OF INSULIN (HCC): ICD-10-CM

## 2022-06-06 ENCOUNTER — OFFICE VISIT (OUTPATIENT)
Dept: INTERNAL MEDICINE CLINIC | Age: 72
End: 2022-06-06
Payer: MEDICARE

## 2022-06-06 VITALS
BODY MASS INDEX: 30.84 KG/M2 | WEIGHT: 167.6 LBS | HEIGHT: 62 IN | RESPIRATION RATE: 16 BRPM | TEMPERATURE: 99.4 F | HEART RATE: 85 BPM | OXYGEN SATURATION: 96 % | SYSTOLIC BLOOD PRESSURE: 109 MMHG | DIASTOLIC BLOOD PRESSURE: 75 MMHG

## 2022-06-06 DIAGNOSIS — R42 DIZZY: Primary | ICD-10-CM

## 2022-06-06 DIAGNOSIS — M45.0 ANKYLOSING SPONDYLITIS OF MULTIPLE SITES IN SPINE (HCC): ICD-10-CM

## 2022-06-06 DIAGNOSIS — E11.65 TYPE 2 DIABETES MELLITUS WITH HYPERGLYCEMIA, WITHOUT LONG-TERM CURRENT USE OF INSULIN (HCC): ICD-10-CM

## 2022-06-06 DIAGNOSIS — D84.89 PRIMARY IMMUNE DEFICIENCY DISORDER (HCC): ICD-10-CM

## 2022-06-06 PROBLEM — N18.30 CHRONIC RENAL DISEASE, STAGE III (HCC): Status: ACTIVE | Noted: 2022-06-06

## 2022-06-06 PROCEDURE — G8754 DIAS BP LESS 90: HCPCS | Performed by: INTERNAL MEDICINE

## 2022-06-06 PROCEDURE — 2022F DILAT RTA XM EVC RTNOPTHY: CPT | Performed by: INTERNAL MEDICINE

## 2022-06-06 PROCEDURE — G8536 NO DOC ELDER MAL SCRN: HCPCS | Performed by: INTERNAL MEDICINE

## 2022-06-06 PROCEDURE — 1090F PRES/ABSN URINE INCON ASSESS: CPT | Performed by: INTERNAL MEDICINE

## 2022-06-06 PROCEDURE — G8399 PT W/DXA RESULTS DOCUMENT: HCPCS | Performed by: INTERNAL MEDICINE

## 2022-06-06 PROCEDURE — G8427 DOCREV CUR MEDS BY ELIG CLIN: HCPCS | Performed by: INTERNAL MEDICINE

## 2022-06-06 PROCEDURE — G8752 SYS BP LESS 140: HCPCS | Performed by: INTERNAL MEDICINE

## 2022-06-06 PROCEDURE — 3046F HEMOGLOBIN A1C LEVEL >9.0%: CPT | Performed by: INTERNAL MEDICINE

## 2022-06-06 PROCEDURE — 99213 OFFICE O/P EST LOW 20 MIN: CPT | Performed by: INTERNAL MEDICINE

## 2022-06-06 PROCEDURE — G8510 SCR DEP NEG, NO PLAN REQD: HCPCS | Performed by: INTERNAL MEDICINE

## 2022-06-06 PROCEDURE — 1101F PT FALLS ASSESS-DOCD LE1/YR: CPT | Performed by: INTERNAL MEDICINE

## 2022-06-06 PROCEDURE — G8417 CALC BMI ABV UP PARAM F/U: HCPCS | Performed by: INTERNAL MEDICINE

## 2022-06-06 PROCEDURE — 3017F COLORECTAL CA SCREEN DOC REV: CPT | Performed by: INTERNAL MEDICINE

## 2022-06-06 PROCEDURE — G9899 SCRN MAM PERF RSLTS DOC: HCPCS | Performed by: INTERNAL MEDICINE

## 2022-06-06 PROCEDURE — G0463 HOSPITAL OUTPT CLINIC VISIT: HCPCS | Performed by: INTERNAL MEDICINE

## 2022-06-06 RX ORDER — AMLODIPINE BESYLATE 5 MG/1
5 TABLET ORAL DAILY
Qty: 90 TABLET | Refills: 1 | Status: SHIPPED | OUTPATIENT
Start: 2022-06-06

## 2022-06-06 NOTE — PROGRESS NOTES
HISTORY OF PRESENT ILLNESS  Popeye Park is a 70 y.o. female. HPI  Seen to discuss her blood pressure. Her pressure has been recently dropping as low as low 100s/70s and she saw Dr. Dennise Washington, who suggested she might adjust her blood pressure meds. She is currently on Amlodipine 10 mg. Describes feeling somewhat weak. No bleeding. Bone density looks good. No chest pain. Review of Systems   Constitutional: Negative for chills, fever and weight loss. Respiratory: Negative for cough, shortness of breath and wheezing. Cardiovascular: Negative for chest pain, palpitations, orthopnea, leg swelling and PND. Gastrointestinal: Negative for abdominal pain, constipation, diarrhea, heartburn and nausea. Musculoskeletal: Negative for myalgias. Neurological: Positive for dizziness. Negative for headaches. Physical Exam  Vitals and nursing note reviewed. Constitutional:       Appearance: She is well-developed. HENT:      Head: Normocephalic and atraumatic. Neck:      Thyroid: No thyromegaly. Vascular: No carotid bruit. Cardiovascular:      Rate and Rhythm: Normal rate and regular rhythm. Heart sounds: Normal heart sounds, S1 normal and S2 normal. No murmur heard. Pulmonary:      Effort: Pulmonary effort is normal. No respiratory distress. Breath sounds: Normal breath sounds. No wheezing or rales. Musculoskeletal:      Cervical back: Normal range of motion and neck supple. Neurological:      Mental Status: She is alert and oriented to person, place, and time. Psychiatric:         Behavior: Behavior normal.         ASSESSMENT and PLAN  Diagnoses and all orders for this visit:    1. Dizzy-with low bp over last few weeks  Dec from 10 to 5 mg dose norvasc  No sxs of bleeding and labs checked often with dr chang  Follow up if signs and symptoms worsen or change. After hours number given. 2. Primary immune deficiency disorder ([facility] [address]    3.  Ankylosing spondylitis of multiple sites in spine ([facility]    4. Type 2 diabetes mellitus with hyperglycemia, without long-term current use of insulin (HCC)    Other orders  -     amLODIPine (NORVASC) 5 mg tablet; Take 1 Tablet by mouth daily.

## 2022-06-21 DIAGNOSIS — I10 ESSENTIAL HYPERTENSION: ICD-10-CM

## 2022-06-22 RX ORDER — OLMESARTAN MEDOXOMIL AND HYDROCHLOROTHIAZIDE 40/12.5 40; 12.5 MG/1; MG/1
1 TABLET ORAL DAILY
Qty: 90 TABLET | Refills: 1 | Status: SHIPPED | OUTPATIENT
Start: 2022-06-22

## 2022-07-11 DIAGNOSIS — E11.65 TYPE 2 DIABETES MELLITUS WITH HYPERGLYCEMIA, WITHOUT LONG-TERM CURRENT USE OF INSULIN (HCC): ICD-10-CM

## 2022-07-11 RX ORDER — METFORMIN HYDROCHLORIDE 500 MG/1
TABLET, EXTENDED RELEASE ORAL
Qty: 180 TABLET | Refills: 1 | Status: SHIPPED | OUTPATIENT
Start: 2022-07-11

## 2022-07-13 DIAGNOSIS — E11.65 TYPE 2 DIABETES MELLITUS WITH HYPERGLYCEMIA, WITHOUT LONG-TERM CURRENT USE OF INSULIN (HCC): ICD-10-CM

## 2022-08-01 DIAGNOSIS — F41.9 ANXIETY: ICD-10-CM

## 2022-08-01 RX ORDER — SERTRALINE HYDROCHLORIDE 100 MG/1
100 TABLET, FILM COATED ORAL DAILY
Qty: 30 TABLET | Refills: 5 | Status: SHIPPED | OUTPATIENT
Start: 2022-08-01

## 2022-10-11 DIAGNOSIS — E11.65 TYPE 2 DIABETES MELLITUS WITH HYPERGLYCEMIA, WITHOUT LONG-TERM CURRENT USE OF INSULIN (HCC): ICD-10-CM

## 2022-11-01 ENCOUNTER — OFFICE VISIT (OUTPATIENT)
Dept: INTERNAL MEDICINE CLINIC | Age: 72
End: 2022-11-01
Payer: MEDICARE

## 2022-11-01 VITALS
TEMPERATURE: 98.1 F | HEIGHT: 62 IN | OXYGEN SATURATION: 98 % | RESPIRATION RATE: 16 BRPM | HEART RATE: 77 BPM | SYSTOLIC BLOOD PRESSURE: 105 MMHG | WEIGHT: 176.6 LBS | DIASTOLIC BLOOD PRESSURE: 70 MMHG | BODY MASS INDEX: 32.5 KG/M2

## 2022-11-01 DIAGNOSIS — R41.3 MEMORY LOSS: ICD-10-CM

## 2022-11-01 DIAGNOSIS — M45.0 ANKYLOSING SPONDYLITIS OF MULTIPLE SITES IN SPINE (HCC): ICD-10-CM

## 2022-11-01 DIAGNOSIS — E78.5 DYSLIPIDEMIA, GOAL LDL BELOW 100: ICD-10-CM

## 2022-11-01 DIAGNOSIS — E11.65 TYPE 2 DIABETES MELLITUS WITH HYPERGLYCEMIA, WITHOUT LONG-TERM CURRENT USE OF INSULIN (HCC): Primary | ICD-10-CM

## 2022-11-01 DIAGNOSIS — I10 ESSENTIAL HYPERTENSION: ICD-10-CM

## 2022-11-01 PROBLEM — N18.30 CHRONIC RENAL DISEASE, STAGE III (HCC): Status: RESOLVED | Noted: 2022-06-06 | Resolved: 2022-11-01

## 2022-11-01 PROCEDURE — 1101F PT FALLS ASSESS-DOCD LE1/YR: CPT | Performed by: INTERNAL MEDICINE

## 2022-11-01 PROCEDURE — G8427 DOCREV CUR MEDS BY ELIG CLIN: HCPCS | Performed by: INTERNAL MEDICINE

## 2022-11-01 PROCEDURE — G8754 DIAS BP LESS 90: HCPCS | Performed by: INTERNAL MEDICINE

## 2022-11-01 PROCEDURE — G0463 HOSPITAL OUTPT CLINIC VISIT: HCPCS | Performed by: INTERNAL MEDICINE

## 2022-11-01 PROCEDURE — G8752 SYS BP LESS 140: HCPCS | Performed by: INTERNAL MEDICINE

## 2022-11-01 PROCEDURE — 3017F COLORECTAL CA SCREEN DOC REV: CPT | Performed by: INTERNAL MEDICINE

## 2022-11-01 PROCEDURE — G8432 DEP SCR NOT DOC, RNG: HCPCS | Performed by: INTERNAL MEDICINE

## 2022-11-01 PROCEDURE — 1090F PRES/ABSN URINE INCON ASSESS: CPT | Performed by: INTERNAL MEDICINE

## 2022-11-01 PROCEDURE — 2022F DILAT RTA XM EVC RTNOPTHY: CPT | Performed by: INTERNAL MEDICINE

## 2022-11-01 PROCEDURE — G8536 NO DOC ELDER MAL SCRN: HCPCS | Performed by: INTERNAL MEDICINE

## 2022-11-01 PROCEDURE — 3046F HEMOGLOBIN A1C LEVEL >9.0%: CPT | Performed by: INTERNAL MEDICINE

## 2022-11-01 PROCEDURE — G8417 CALC BMI ABV UP PARAM F/U: HCPCS | Performed by: INTERNAL MEDICINE

## 2022-11-01 PROCEDURE — 99214 OFFICE O/P EST MOD 30 MIN: CPT | Performed by: INTERNAL MEDICINE

## 2022-11-01 PROCEDURE — G9899 SCRN MAM PERF RSLTS DOC: HCPCS | Performed by: INTERNAL MEDICINE

## 2022-11-01 PROCEDURE — G8399 PT W/DXA RESULTS DOCUMENT: HCPCS | Performed by: INTERNAL MEDICINE

## 2022-11-01 RX ORDER — CELECOXIB 200 MG/1
200 CAPSULE ORAL DAILY
COMMUNITY

## 2022-11-01 NOTE — PROGRESS NOTES
HISTORY OF PRESENT ILLNESS  Luis A Murrieta is a 70 y.o. female. HPI  Seen for med check. Her main issues continue to be mobility. She reports a recent fall. She is working with Dr. Cassi Beltran on the chronic ankylosing spondylitis and back pain. Off of Tramadol and now on Celebrex. Not sure that any of it has made much difference. Also continues to have trouble with memory and notes she has some trouble remembering her meds. Will see Dr. Precious Platt in December for neuropsych testing. Diabetes, currently on two Metformin a day and Januvia 100. Due for A1c. Hypertension, very well controlled on Olmesartan/HCTZ 40/12.5 and Amlodipine 5. No constipation. No cardiac symptoms. Mood. Remains on Sertraline 100. Sad because her daughter and grandchildren will be moving to [place] next summer. Review of Systems   Constitutional:  Positive for malaise/fatigue. Negative for chills, fever and weight loss. Respiratory:  Negative for cough, shortness of breath and wheezing. Cardiovascular:  Negative for chest pain, palpitations, orthopnea, leg swelling and PND. Gastrointestinal:  Negative for abdominal pain, blood in stool, constipation, diarrhea, heartburn and nausea. Musculoskeletal:  Positive for back pain and falls. Negative for myalgias. Neurological:  Negative for dizziness and headaches. Psychiatric/Behavioral:  Positive for memory loss. Physical Exam  Vitals and nursing note reviewed. Constitutional:       Appearance: She is well-developed. HENT:      Head: Normocephalic and atraumatic. Neck:      Thyroid: No thyromegaly. Vascular: No carotid bruit. Cardiovascular:      Rate and Rhythm: Normal rate and regular rhythm. Heart sounds: Normal heart sounds, S1 normal and S2 normal. No murmur heard. Pulmonary:      Effort: Pulmonary effort is normal. No respiratory distress. Breath sounds: Normal breath sounds. No wheezing or rales.    Musculoskeletal:      Cervical back: Normal range of motion and neck supple. Right lower leg: No edema. Left lower leg: No edema. Comments: Able to get on and off exam table slowly  Feet in good repair   Neurological:      Mental Status: She is alert and oriented to person, place, and time. Psychiatric:         Behavior: Behavior normal.       ASSESSMENT and PLAN  Diagnoses and all orders for this visit:    1. Type 2 diabetes mellitus with hyperglycemia, without long-term current use of insulin (HCC)  -     MICROALBUMIN, UR, RAND W/ MICROALB/CREAT RATIO; Future  -     HEMOGLOBIN A1C WITH EAG; Future    2. Essential hypertension-very well controlled can consider dec in dose norvasc if add sglt2 inhibitor for dm will see how labs look    3. Ankylosing spondylitis of multiple sites in spine ([facility] [address]    4. Dyslipidemia, goal LDL below 902  -     METABOLIC PANEL, COMPREHENSIVE; Future  -     LIPID PANEL; Future  -     TSH 3RD GENERATION; Future    5.  Memory loss-agree with neuropsych testing  Appt in  ayesha

## 2022-11-02 LAB
ALBUMIN SERPL-MCNC: 4.1 G/DL (ref 3.5–5)
ALBUMIN/GLOB SERPL: 1.3 {RATIO} (ref 1.1–2.2)
ALP SERPL-CCNC: 69 U/L (ref 45–117)
ALT SERPL-CCNC: 58 U/L (ref 12–78)
ANION GAP SERPL CALC-SCNC: 7 MMOL/L (ref 5–15)
AST SERPL-CCNC: 16 U/L (ref 15–37)
BILIRUB SERPL-MCNC: 0.7 MG/DL (ref 0.2–1)
BUN SERPL-MCNC: 26 MG/DL (ref 6–20)
BUN/CREAT SERPL: 23 (ref 12–20)
CALCIUM SERPL-MCNC: 9.9 MG/DL (ref 8.5–10.1)
CHLORIDE SERPL-SCNC: 104 MMOL/L (ref 97–108)
CHOLEST SERPL-MCNC: 168 MG/DL
CO2 SERPL-SCNC: 29 MMOL/L (ref 21–32)
CREAT SERPL-MCNC: 1.14 MG/DL (ref 0.55–1.02)
CREAT UR-MCNC: 310 MG/DL
EST. AVERAGE GLUCOSE BLD GHB EST-MCNC: 174 MG/DL
GLOBULIN SER CALC-MCNC: 3.2 G/DL (ref 2–4)
GLUCOSE SERPL-MCNC: 194 MG/DL (ref 65–100)
HBA1C MFR BLD: 7.7 % (ref 4–5.6)
HDLC SERPL-MCNC: 49 MG/DL
HDLC SERPL: 3.4 {RATIO} (ref 0–5)
LDLC SERPL CALC-MCNC: 76.8 MG/DL (ref 0–100)
MICROALBUMIN UR-MCNC: 2.51 MG/DL
MICROALBUMIN/CREAT UR-RTO: 8 MG/G (ref 0–30)
POTASSIUM SERPL-SCNC: 3.8 MMOL/L (ref 3.5–5.1)
PROT SERPL-MCNC: 7.3 G/DL (ref 6.4–8.2)
SODIUM SERPL-SCNC: 140 MMOL/L (ref 136–145)
TRIGL SERPL-MCNC: 211 MG/DL (ref ?–150)
TSH SERPL DL<=0.05 MIU/L-ACNC: 2.59 UIU/ML (ref 0.36–3.74)
VLDLC SERPL CALC-MCNC: 42.2 MG/DL

## 2022-11-02 NOTE — PROGRESS NOTES
Can you let her know that with her hgba1c of 7.7 I do advise diet modification but do  not  think we need to change  her meds at this point-cont t he metformin and [place] currently on  See me in 6 mo  Thyroid is good  Kidneys are stable over last year

## 2022-11-18 ENCOUNTER — OFFICE VISIT (OUTPATIENT)
Dept: INTERNAL MEDICINE CLINIC | Age: 72
End: 2022-11-18
Payer: MEDICARE

## 2022-11-18 VITALS
HEART RATE: 81 BPM | DIASTOLIC BLOOD PRESSURE: 77 MMHG | BODY MASS INDEX: 32.5 KG/M2 | TEMPERATURE: 98.2 F | SYSTOLIC BLOOD PRESSURE: 135 MMHG | HEIGHT: 62 IN | RESPIRATION RATE: 16 BRPM | WEIGHT: 176.6 LBS | OXYGEN SATURATION: 99 %

## 2022-11-18 DIAGNOSIS — D84.9 IMMUNOCOMPROMISED (HCC): ICD-10-CM

## 2022-11-18 DIAGNOSIS — R50.9 FEVER AND CHILLS: Primary | ICD-10-CM

## 2022-11-18 DIAGNOSIS — J45.21 MILD INTERMITTENT REACTIVE AIRWAY DISEASE WITH ACUTE EXACERBATION: ICD-10-CM

## 2022-11-18 DIAGNOSIS — R05.9 COUGH IN ADULT: ICD-10-CM

## 2022-11-18 DIAGNOSIS — M45.9 ANKYLOSING SPONDYLITIS, UNSPECIFIED SITE OF SPINE (HCC): ICD-10-CM

## 2022-11-18 LAB
EXP DATE SOLUTION: NORMAL
EXP DATE SWAB: NORMAL
LOT NUMBER SOLUTION: NORMAL
LOT NUMBER SWAB: NORMAL
QUICKVUE INFLUENZA TEST: NEGATIVE
SARS-COV-2 RNA POC: NEGATIVE
VALID INTERNAL CONTROL?: YES

## 2022-11-18 PROCEDURE — G8432 DEP SCR NOT DOC, RNG: HCPCS | Performed by: INTERNAL MEDICINE

## 2022-11-18 PROCEDURE — G8536 NO DOC ELDER MAL SCRN: HCPCS | Performed by: INTERNAL MEDICINE

## 2022-11-18 PROCEDURE — G8417 CALC BMI ABV UP PARAM F/U: HCPCS | Performed by: INTERNAL MEDICINE

## 2022-11-18 PROCEDURE — G0463 HOSPITAL OUTPT CLINIC VISIT: HCPCS | Performed by: INTERNAL MEDICINE

## 2022-11-18 PROCEDURE — 1090F PRES/ABSN URINE INCON ASSESS: CPT | Performed by: INTERNAL MEDICINE

## 2022-11-18 PROCEDURE — G8752 SYS BP LESS 140: HCPCS | Performed by: INTERNAL MEDICINE

## 2022-11-18 PROCEDURE — G8427 DOCREV CUR MEDS BY ELIG CLIN: HCPCS | Performed by: INTERNAL MEDICINE

## 2022-11-18 PROCEDURE — 87804 INFLUENZA ASSAY W/OPTIC: CPT | Performed by: INTERNAL MEDICINE

## 2022-11-18 PROCEDURE — 99214 OFFICE O/P EST MOD 30 MIN: CPT | Performed by: INTERNAL MEDICINE

## 2022-11-18 PROCEDURE — 87635 SARS-COV-2 COVID-19 AMP PRB: CPT | Performed by: INTERNAL MEDICINE

## 2022-11-18 PROCEDURE — 1101F PT FALLS ASSESS-DOCD LE1/YR: CPT | Performed by: INTERNAL MEDICINE

## 2022-11-18 PROCEDURE — 3017F COLORECTAL CA SCREEN DOC REV: CPT | Performed by: INTERNAL MEDICINE

## 2022-11-18 PROCEDURE — G9899 SCRN MAM PERF RSLTS DOC: HCPCS | Performed by: INTERNAL MEDICINE

## 2022-11-18 PROCEDURE — G8754 DIAS BP LESS 90: HCPCS | Performed by: INTERNAL MEDICINE

## 2022-11-18 PROCEDURE — G8399 PT W/DXA RESULTS DOCUMENT: HCPCS | Performed by: INTERNAL MEDICINE

## 2022-11-18 RX ORDER — ALBUTEROL SULFATE 90 UG/1
1 AEROSOL, METERED RESPIRATORY (INHALATION)
Qty: 18 G | Refills: 1 | Status: SHIPPED | OUTPATIENT
Start: 2022-11-18

## 2022-11-18 RX ORDER — DOXYCYCLINE 100 MG/1
100 TABLET ORAL 2 TIMES DAILY
Qty: 14 TABLET | Refills: 0 | Status: SHIPPED | OUTPATIENT
Start: 2022-11-18

## 2022-11-18 RX ORDER — FLUTICASONE PROPIONATE AND SALMETEROL 250; 50 UG/1; UG/1
1 POWDER RESPIRATORY (INHALATION) 2 TIMES DAILY
Qty: 60 EACH | Refills: 1 | Status: SHIPPED | OUTPATIENT
Start: 2022-11-18 | End: 2022-11-21 | Stop reason: SDUPTHER

## 2022-11-18 NOTE — PROGRESS NOTES
HISTORY OF PRESENT ILLNESS  Caitlin Larkin is a 70 y.o. female. HPI  Has upper respiratory symptoms. Did have one day of high fever, but has not recurred. Still wheezing, coughing, used albuterol twice yesterday. No recent nausea, vomiting or diarrhea. Does have terrible fatigue. Using Allegra, Flonase and albuterol p.r.n. Flu and COVID test negative here. Review of Systems   Constitutional:  Positive for malaise/fatigue. Negative for chills, diaphoresis and fever. HENT:  Positive for congestion. Negative for ear discharge, ear pain, nosebleeds and sore throat. Eyes:  Negative for pain and discharge. Respiratory:  Positive for cough, shortness of breath and wheezing. Negative for hemoptysis and sputum production. Cardiovascular:  Negative for chest pain. Gastrointestinal:  Negative for abdominal pain. Neurological:  Negative for headaches. Physical Exam  Vitals and nursing note reviewed. Constitutional:       Appearance: She is well-developed. Comments: tired   HENT:      Head: Normocephalic. Right Ear: Tympanic membrane, ear canal and external ear normal.      Left Ear: Tympanic membrane, ear canal and external ear normal.      Nose: Nose normal.      Mouth/Throat:      Pharynx: No oropharyngeal exudate. Eyes:      General:         Right eye: No discharge. Left eye: No discharge. Conjunctiva/sclera: Conjunctivae normal.      Pupils: Pupils are equal, round, and reactive to light. Cardiovascular:      Rate and Rhythm: Normal rate and regular rhythm. Heart sounds: Normal heart sounds. Pulmonary:      Effort: Pulmonary effort is normal. No respiratory distress. Breath sounds: Wheezing (few) present. No rales. Musculoskeletal:      Cervical back: Normal range of motion and neck supple. Lymphadenopathy:      Cervical: No cervical adenopathy. ASSESSMENT and PLAN  Diagnoses and all orders for this visit:    1.  Fever and chills-covid and flu neg  Persistent sxs in immunocompromised pt start abx and advair  Track  temp if over 100.4 needs eval  -     AMB POC RAPID INFLUENZA TEST  -     AMB POC COVID-19 COV  -     doxycycline (ADOXA) 100 mg tablet; Take 1 Tablet by mouth two (2) times a day. 2. Cough in adult  -     AMB POC RAPID INFLUENZA TEST  -     AMB POC COVID-19 COV  -     fluticasone propion-salmeteroL (ADVAIR/WIXELA) 250-50 mcg/dose diskus inhaler; Take 1 Puff by inhalation two (2) times a day. 3. Ankylosing spondylitis, unspecified site of spine ([facility] [address]    4. Immunocompromised ([facility] [address]    5. Mild intermittent reactive airway disease with acute exacerbation  -     albuterol (PROVENTIL HFA, VENTOLIN HFA, PROAIR HFA) 90 mcg/actuation inhaler; Take 1 Puff by inhalation every six (6) hours as needed for Shortness of Breath.

## 2022-11-21 DIAGNOSIS — R05.9 COUGH IN ADULT: ICD-10-CM

## 2022-11-21 RX ORDER — FLUTICASONE PROPIONATE AND SALMETEROL 250; 50 UG/1; UG/1
1 POWDER RESPIRATORY (INHALATION) 2 TIMES DAILY
Qty: 180 EACH | Refills: 0 | Status: SHIPPED | OUTPATIENT
Start: 2022-11-21

## 2022-12-27 DIAGNOSIS — I10 ESSENTIAL HYPERTENSION: ICD-10-CM

## 2022-12-28 RX ORDER — OLMESARTAN MEDOXOMIL AND HYDROCHLOROTHIAZIDE 40/12.5 40; 12.5 MG/1; MG/1
1 TABLET ORAL DAILY
Qty: 90 TABLET | Refills: 1 | Status: SHIPPED | OUTPATIENT
Start: 2022-12-28

## 2023-01-01 ENCOUNTER — TELEPHONE (OUTPATIENT)
Age: 73
End: 2023-01-01

## 2023-01-01 ENCOUNTER — HOSPITAL ENCOUNTER (EMERGENCY)
Facility: HOSPITAL | Age: 73
End: 2023-11-04
Attending: EMERGENCY MEDICINE
Payer: MEDICARE

## 2023-01-01 VITALS — SYSTOLIC BLOOD PRESSURE: 91 MMHG | HEART RATE: 64 BPM | RESPIRATION RATE: 13 BRPM | DIASTOLIC BLOOD PRESSURE: 49 MMHG

## 2023-01-01 DIAGNOSIS — I46.9 CARDIAC ARREST (HCC): Primary | ICD-10-CM

## 2023-01-01 LAB
ANION GAP BLD CALC-SCNC: ABNORMAL (ref 10–20)
CA-I BLD-MCNC: 1.32 MMOL/L (ref 1.12–1.32)
CHLORIDE BLD-SCNC: 103 MMOL/L (ref 100–108)
CREAT UR-MCNC: 2.5 MG/DL (ref 0.6–1.3)
GLUCOSE BLD STRIP.AUTO-MCNC: 637 MG/DL (ref 74–106)
LACTATE BLD-SCNC: >18 MMOL/L (ref 0.4–2)
PCO2 BLDV: >110 MMHG (ref 41–51)
PH BLDV: 6.73 (ref 7.32–7.42)
PO2 BLDV: <27 MMHG (ref 25–40)
POTASSIUM BLD-SCNC: 7.1 MMOL/L (ref 3.5–5.5)
SERVICE CMNT-IMP: ABNORMAL
SODIUM BLD-SCNC: 136 MMOL/L (ref 136–145)
SPECIMEN SITE: ABNORMAL

## 2023-01-01 PROCEDURE — 84132 ASSAY OF SERUM POTASSIUM: CPT

## 2023-01-01 PROCEDURE — 6360000002 HC RX W HCPCS: Performed by: EMERGENCY MEDICINE

## 2023-01-01 PROCEDURE — 31500 INSERT EMERGENCY AIRWAY: CPT

## 2023-01-01 PROCEDURE — 99285 EMERGENCY DEPT VISIT HI MDM: CPT

## 2023-01-01 PROCEDURE — 92950 HEART/LUNG RESUSCITATION CPR: CPT

## 2023-01-01 PROCEDURE — 82947 ASSAY GLUCOSE BLOOD QUANT: CPT

## 2023-01-01 PROCEDURE — 82330 ASSAY OF CALCIUM: CPT

## 2023-01-01 PROCEDURE — 82803 BLOOD GASES ANY COMBINATION: CPT

## 2023-01-01 PROCEDURE — 84295 ASSAY OF SERUM SODIUM: CPT

## 2023-01-01 PROCEDURE — 2500000003 HC RX 250 WO HCPCS: Performed by: EMERGENCY MEDICINE

## 2023-01-01 RX ORDER — EPINEPHRINE IN SOD CHLOR,ISO 1 MG/10 ML
SYRINGE (ML) INTRAVENOUS DAILY PRN
Status: COMPLETED | OUTPATIENT
Start: 2023-01-01 | End: 2023-01-01

## 2023-01-01 RX ORDER — ATROPINE SULFATE 0.1 MG/ML
INJECTION INTRAVENOUS DAILY PRN
Status: COMPLETED | OUTPATIENT
Start: 2023-01-01 | End: 2023-01-01

## 2023-01-01 RX ADMIN — SODIUM BICARBONATE 50 MEQ: 84 INJECTION, SOLUTION INTRAVENOUS at 09:10

## 2023-01-01 RX ADMIN — EPINEPHRINE 1 MG: 0.1 INJECTION, SOLUTION ENDOTRACHEAL; INTRACARDIAC; INTRAVENOUS at 09:17

## 2023-01-01 RX ADMIN — ATROPINE SULFATE 1 MG: 0.1 INJECTION, SOLUTION ENDOTRACHEAL; INTRAMUSCULAR; INTRAVENOUS; SUBCUTANEOUS at 09:25

## 2023-01-01 RX ADMIN — EPINEPHRINE 0.5 MG: 0.1 INJECTION, SOLUTION ENDOTRACHEAL; INTRACARDIAC; INTRAVENOUS at 09:14

## 2023-01-01 RX ADMIN — EPINEPHRINE 1 MG: 0.1 INJECTION, SOLUTION ENDOTRACHEAL; INTRACARDIAC; INTRAVENOUS at 09:11

## 2023-01-01 RX ADMIN — SODIUM BICARBONATE 50 MEQ: 84 INJECTION, SOLUTION INTRAVENOUS at 09:19

## 2023-01-01 RX ADMIN — PHENYLEPHRINE HYDROCHLORIDE 6 MG: 10 INJECTION INTRAVENOUS at 09:23

## 2023-01-01 RX ADMIN — EPINEPHRINE 1 MG: 0.1 INJECTION, SOLUTION ENDOTRACHEAL; INTRACARDIAC; INTRAVENOUS at 09:39

## 2023-01-01 RX ADMIN — EPINEPHRINE 1 MG: 0.1 INJECTION, SOLUTION ENDOTRACHEAL; INTRACARDIAC; INTRAVENOUS at 09:27

## 2023-01-01 RX ADMIN — EPINEPHRINE 1 MG: 0.1 INJECTION, SOLUTION ENDOTRACHEAL; INTRACARDIAC; INTRAVENOUS at 09:33

## 2023-01-09 DIAGNOSIS — E78.5 DYSLIPIDEMIA, GOAL LDL BELOW 100: ICD-10-CM

## 2023-01-09 DIAGNOSIS — E11.65 TYPE 2 DIABETES MELLITUS WITH HYPERGLYCEMIA, WITHOUT LONG-TERM CURRENT USE OF INSULIN (HCC): ICD-10-CM

## 2023-01-09 RX ORDER — METFORMIN HYDROCHLORIDE 500 MG/1
TABLET, EXTENDED RELEASE ORAL
Qty: 180 TABLET | Refills: 1 | Status: SHIPPED | OUTPATIENT
Start: 2023-01-09

## 2023-01-09 RX ORDER — ATORVASTATIN CALCIUM 20 MG/1
TABLET, FILM COATED ORAL
Qty: 90 TABLET | Refills: 1 | Status: SHIPPED | OUTPATIENT
Start: 2023-01-09

## 2023-01-12 RX ORDER — AMLODIPINE BESYLATE 5 MG/1
5 TABLET ORAL DAILY
Qty: 90 TABLET | Refills: 1 | Status: SHIPPED | OUTPATIENT
Start: 2023-01-12

## 2023-01-26 DIAGNOSIS — G31.84 MCI (MILD COGNITIVE IMPAIRMENT): ICD-10-CM

## 2023-02-20 DIAGNOSIS — F41.9 ANXIETY: ICD-10-CM

## 2023-02-22 RX ORDER — SERTRALINE HYDROCHLORIDE 100 MG/1
100 TABLET, FILM COATED ORAL DAILY
Qty: 30 TABLET | Refills: 5 | Status: SHIPPED | OUTPATIENT
Start: 2023-02-22

## 2023-03-01 ENCOUNTER — OFFICE VISIT (OUTPATIENT)
Dept: INTERNAL MEDICINE CLINIC | Age: 73
End: 2023-03-01
Payer: MEDICARE

## 2023-03-01 VITALS
HEIGHT: 62 IN | SYSTOLIC BLOOD PRESSURE: 127 MMHG | DIASTOLIC BLOOD PRESSURE: 81 MMHG | HEART RATE: 81 BPM | BODY MASS INDEX: 32.68 KG/M2 | OXYGEN SATURATION: 99 % | WEIGHT: 177.6 LBS | TEMPERATURE: 97.6 F | RESPIRATION RATE: 16 BRPM

## 2023-03-01 DIAGNOSIS — R42 DIZZY: ICD-10-CM

## 2023-03-01 DIAGNOSIS — F41.9 ANXIETY AND DEPRESSION: ICD-10-CM

## 2023-03-01 DIAGNOSIS — F32.A ANXIETY AND DEPRESSION: ICD-10-CM

## 2023-03-01 DIAGNOSIS — R41.3 MEMORY LOSS: Primary | ICD-10-CM

## 2023-03-01 DIAGNOSIS — D84.9 IMMUNOCOMPROMISED (HCC): ICD-10-CM

## 2023-03-01 DIAGNOSIS — M45.9 ANKYLOSING SPONDYLITIS, UNSPECIFIED SITE OF SPINE (HCC): ICD-10-CM

## 2023-03-01 DIAGNOSIS — E11.65 TYPE 2 DIABETES MELLITUS WITH HYPERGLYCEMIA, WITHOUT LONG-TERM CURRENT USE OF INSULIN (HCC): ICD-10-CM

## 2023-03-01 PROCEDURE — G9899 SCRN MAM PERF RSLTS DOC: HCPCS | Performed by: INTERNAL MEDICINE

## 2023-03-01 PROCEDURE — G8432 DEP SCR NOT DOC, RNG: HCPCS | Performed by: INTERNAL MEDICINE

## 2023-03-01 PROCEDURE — G0463 HOSPITAL OUTPT CLINIC VISIT: HCPCS | Performed by: INTERNAL MEDICINE

## 2023-03-01 PROCEDURE — 1101F PT FALLS ASSESS-DOCD LE1/YR: CPT | Performed by: INTERNAL MEDICINE

## 2023-03-01 PROCEDURE — 2022F DILAT RTA XM EVC RTNOPTHY: CPT | Performed by: INTERNAL MEDICINE

## 2023-03-01 PROCEDURE — 3046F HEMOGLOBIN A1C LEVEL >9.0%: CPT | Performed by: INTERNAL MEDICINE

## 2023-03-01 PROCEDURE — G8417 CALC BMI ABV UP PARAM F/U: HCPCS | Performed by: INTERNAL MEDICINE

## 2023-03-01 PROCEDURE — G8536 NO DOC ELDER MAL SCRN: HCPCS | Performed by: INTERNAL MEDICINE

## 2023-03-01 PROCEDURE — 1090F PRES/ABSN URINE INCON ASSESS: CPT | Performed by: INTERNAL MEDICINE

## 2023-03-01 PROCEDURE — G8399 PT W/DXA RESULTS DOCUMENT: HCPCS | Performed by: INTERNAL MEDICINE

## 2023-03-01 PROCEDURE — 3017F COLORECTAL CA SCREEN DOC REV: CPT | Performed by: INTERNAL MEDICINE

## 2023-03-01 PROCEDURE — G8427 DOCREV CUR MEDS BY ELIG CLIN: HCPCS | Performed by: INTERNAL MEDICINE

## 2023-03-01 PROCEDURE — 99214 OFFICE O/P EST MOD 30 MIN: CPT | Performed by: INTERNAL MEDICINE

## 2023-03-01 RX ORDER — TRAZODONE HYDROCHLORIDE 50 MG/1
50 TABLET ORAL
Qty: 30 TABLET | Refills: 2 | Status: SHIPPED | OUTPATIENT
Start: 2023-03-01

## 2023-03-01 RX ORDER — ESCITALOPRAM OXALATE 10 MG/1
10 TABLET ORAL DAILY
Qty: 30 TABLET | Refills: 2 | Status: SHIPPED | OUTPATIENT
Start: 2023-03-01

## 2023-03-01 NOTE — PATIENT INSTRUCTIONS
Please cut the zoloft in half and use 50 mg daily for 1 week and then stop it  Please start the lexapro 10 mg daily  now and use trazodone 50 mg at bedtime  You will be on both lexapro and zoloft for 1 week only  See me in the office in 1 month

## 2023-03-01 NOTE — PROGRESS NOTES
HISTORY OF PRESENT ILLNESS  Hang Orozco is a 67 y.o. female. HPI  Since our last visit, she has had neuropsych testing with Dr. Bhavik Gonzalez, who felt primarily she had anxiety and depression driving memory concerns, possibly very early mild cognitive impairment. He strongly recommended psychiatry evaluation. On discussion, she does feel very stressed because daughter is moving to [place] and notes sleep is quite disrupted, often does not get to sleep until 9:00 in the morning and then only sleeps for two to three hours. Tries to go to bed at midnight, but often does not fall asleep then. Does endorse brain ruminating. On Zoloft 100 mg. Also notes multiple falls, working with Dr. Bertha Moss, and has been referred to physical therapy, and ongoing dizziness, which has been a daily event certainly since last spring. Blood pressure has not been low and fortunately no serious injuries or recent falls. Review of Systems   Constitutional:  Positive for malaise/fatigue. Negative for chills, fever and weight loss. Respiratory:  Negative for cough, shortness of breath and wheezing. Cardiovascular:  Negative for chest pain, palpitations, orthopnea, leg swelling and PND. Gastrointestinal:  Negative for heartburn and nausea. Musculoskeletal:  Negative for myalgias. Neurological:  Positive for dizziness. Negative for headaches. Psychiatric/Behavioral:  Positive for memory loss. The patient is nervous/anxious and has insomnia. Physical Exam  Vitals and nursing note reviewed. Constitutional:       Appearance: She is well-developed. HENT:      Head: Normocephalic and atraumatic. Neck:      Thyroid: No thyromegaly. Vascular: No carotid bruit. Cardiovascular:      Rate and Rhythm: Normal rate and regular rhythm. Heart sounds: Normal heart sounds, S1 normal and S2 normal. No murmur heard. Pulmonary:      Effort: Pulmonary effort is normal. No respiratory distress.       Breath sounds: Normal breath sounds. No wheezing or rales. Musculoskeletal:      Cervical back: Normal range of motion and neck supple. Comments: Able to get on and off exam table slowly but on her own   Neurological:      Mental Status: She is alert and oriented to person, place, and time. Psychiatric:         Behavior: Behavior normal.       ASSESSMENT and PLAN  Diagnoses and all orders for this visit:    1. Memory loss    2. Immunocompromised ([facility] [address]    3. Ankylosing spondylitis, unspecified site of spine ([facility] [address]    4. Type 2 diabetes mellitus with hyperglycemia, without long-term current use of insulin (HCC)  -     HEMOGLOBIN A1C WITH EAG; Future  -     METABOLIC PANEL, COMPREHENSIVE; Future    5. Anxiety and depression  -     escitalopram oxalate (LEXAPRO) 10 mg tablet; Take 1 Tablet by mouth daily. -     traZODone (DESYREL) 50 mg tablet; Take 1 Tablet by mouth nightly. 6. Dizzy  -     CBC WITH AUTOMATED DIFF;  Future      the following changes in treatment are made: taper off zoloft and on to lexapro  Start trazodone  Refer for cbt  Appt in 1mo

## 2023-03-03 ENCOUNTER — TELEPHONE (OUTPATIENT)
Dept: INTERNAL MEDICINE CLINIC | Age: 73
End: 2023-03-03

## 2023-03-03 NOTE — TELEPHONE ENCOUNTER
I called to discuss trend in cr  Advised dec use of celebrex take it only prn occasionally and use tyelnol and  plan repeat in 1 mo

## 2023-04-05 ENCOUNTER — OFFICE VISIT (OUTPATIENT)
Dept: INTERNAL MEDICINE CLINIC | Age: 73
End: 2023-04-05
Payer: MEDICARE

## 2023-04-05 ENCOUNTER — HOSPITAL ENCOUNTER (OUTPATIENT)
Dept: GENERAL RADIOLOGY | Age: 73
End: 2023-04-05
Payer: MEDICARE

## 2023-04-05 PROBLEM — N18.30 CHRONIC RENAL DISEASE, STAGE III (HCC): Status: ACTIVE | Noted: 2023-04-05

## 2023-04-05 PROCEDURE — 72100 X-RAY EXAM L-S SPINE 2/3 VWS: CPT

## 2023-04-05 PROCEDURE — 72050 X-RAY EXAM NECK SPINE 4/5VWS: CPT

## 2023-04-05 PROCEDURE — G0463 HOSPITAL OUTPT CLINIC VISIT: HCPCS | Performed by: INTERNAL MEDICINE

## 2023-04-05 NOTE — PROGRESS NOTES
HISTORY OF PRESENT ILLNESS  Best Lang is a 67 y.o. female. HPI   Dictation on: 04/05/2023  1:41 PM by: Nash Harding     Review of Systems   Constitutional:  Positive for malaise/fatigue. Negative for chills, diaphoresis, fever and weight loss. Respiratory:  Negative for cough, shortness of breath and wheezing. Cardiovascular:  Negative for chest pain, palpitations, orthopnea, leg swelling and PND. Gastrointestinal:  Negative for abdominal pain, heartburn and nausea. Genitourinary:  Negative for dysuria, flank pain, hematuria and urgency. Feels some sense of retention   Musculoskeletal:  Positive for falls. Negative for myalgias. Neurological:  Negative for dizziness and headaches. Psychiatric/Behavioral:  The patient is not nervous/anxious and does not have insomnia. Physical Exam  Vitals and nursing note reviewed. Constitutional:       Appearance: She is well-developed. HENT:      Head: Normocephalic and atraumatic. Neck:      Thyroid: No thyromegaly. Vascular: No carotid bruit. Cardiovascular:      Rate and Rhythm: Normal rate and regular rhythm. Heart sounds: Normal heart sounds, S1 normal and S2 normal. No murmur heard. Pulmonary:      Effort: Pulmonary effort is normal. No respiratory distress. Breath sounds: Normal breath sounds. No wheezing or rales. Musculoskeletal:      Cervical back: Normal range of motion and neck supple. Comments: Walking with a rollator   Neurological:      Mental Status: She is alert and oriented to person, place, and time. Psychiatric:         Behavior: Behavior normal.       ASSESSMENT and PLAN  Diagnoses and all orders for this visit:    1. Anxiety and depression    2. Frequent falls  -     XR SPINE LUMB 2 OR 3 V; Future  -     XR SPINE CERV 4 OR 5 V; Future    3. Stage 3 chronic kidney disease, unspecified whether stage 3a or 3b CKD (HCC)  -     METABOLIC PANEL, COMPREHENSIVE; Future  Stay off nsaids  4. Urinary retention  -     REFERRAL TO UROLOGY    5. Other chronic back pain  -     XR SPINE LUMB 2 OR 3 V; Future

## 2023-04-06 NOTE — PROGRESS NOTES
Can you let her know her renal function is so much better now off the celebrex  Continue off celebrex

## 2023-04-26 DIAGNOSIS — F32.A ANXIETY AND DEPRESSION: ICD-10-CM

## 2023-04-26 DIAGNOSIS — F41.9 ANXIETY AND DEPRESSION: ICD-10-CM

## 2023-04-27 RX ORDER — TRAZODONE HYDROCHLORIDE 50 MG/1
TABLET ORAL
Qty: 90 TABLET | Refills: 1 | Status: SHIPPED | OUTPATIENT
Start: 2023-04-27

## 2023-04-27 RX ORDER — ESCITALOPRAM OXALATE 10 MG/1
10 TABLET ORAL DAILY
Qty: 90 TABLET | Refills: 1 | Status: SHIPPED | OUTPATIENT
Start: 2023-04-27

## 2023-06-02 ENCOUNTER — HOSPITAL ENCOUNTER (OUTPATIENT)
Facility: HOSPITAL | Age: 73
End: 2023-06-02
Payer: MEDICARE

## 2023-06-02 ENCOUNTER — OFFICE VISIT (OUTPATIENT)
Age: 73
End: 2023-06-02
Payer: MEDICARE

## 2023-06-02 VITALS
OXYGEN SATURATION: 98 % | TEMPERATURE: 98.3 F | BODY MASS INDEX: 31.25 KG/M2 | RESPIRATION RATE: 16 BRPM | HEIGHT: 62 IN | DIASTOLIC BLOOD PRESSURE: 72 MMHG | WEIGHT: 169.8 LBS | SYSTOLIC BLOOD PRESSURE: 125 MMHG | HEART RATE: 70 BPM

## 2023-06-02 DIAGNOSIS — R05.3 CHRONIC COUGH: ICD-10-CM

## 2023-06-02 DIAGNOSIS — R53.83 OTHER FATIGUE: ICD-10-CM

## 2023-06-02 DIAGNOSIS — R05.3 CHRONIC COUGH: Primary | ICD-10-CM

## 2023-06-02 DIAGNOSIS — R50.9 FEVER, UNSPECIFIED FEVER CAUSE: ICD-10-CM

## 2023-06-02 DIAGNOSIS — I10 ESSENTIAL HYPERTENSION, BENIGN: ICD-10-CM

## 2023-06-02 LAB
BASOPHILS # BLD: 0.1 K/UL (ref 0–0.1)
BASOPHILS NFR BLD: 1 % (ref 0–1)
DIFFERENTIAL METHOD BLD: NORMAL
EOSINOPHIL # BLD: 0.2 K/UL (ref 0–0.4)
EOSINOPHIL NFR BLD: 2 % (ref 0–7)
ERYTHROCYTE [DISTWIDTH] IN BLOOD BY AUTOMATED COUNT: 14.4 % (ref 11.5–14.5)
HCT VFR BLD AUTO: 39.6 % (ref 35–47)
HGB BLD-MCNC: 13.2 G/DL (ref 11.5–16)
IMM GRANULOCYTES # BLD AUTO: 0 K/UL (ref 0–0.04)
IMM GRANULOCYTES NFR BLD AUTO: 0 % (ref 0–0.5)
LYMPHOCYTES # BLD: 3.2 K/UL (ref 0.8–3.5)
LYMPHOCYTES NFR BLD: 40 % (ref 12–49)
MCH RBC QN AUTO: 32.4 PG (ref 26–34)
MCHC RBC AUTO-ENTMCNC: 33.3 G/DL (ref 30–36.5)
MCV RBC AUTO: 97.1 FL (ref 80–99)
MONOCYTES # BLD: 0.8 K/UL (ref 0–1)
MONOCYTES NFR BLD: 10 % (ref 5–13)
NEUTS SEG # BLD: 3.6 K/UL (ref 1.8–8)
NEUTS SEG NFR BLD: 47 % (ref 32–75)
NRBC # BLD: 0 K/UL (ref 0–0.01)
NRBC BLD-RTO: 0 PER 100 WBC
PLATELET # BLD AUTO: 283 K/UL (ref 150–400)
PMV BLD AUTO: 9.6 FL (ref 8.9–12.9)
RBC # BLD AUTO: 4.08 M/UL (ref 3.8–5.2)
WBC # BLD AUTO: 7.9 K/UL (ref 3.6–11)

## 2023-06-02 PROCEDURE — 1123F ACP DISCUSS/DSCN MKR DOCD: CPT | Performed by: NURSE PRACTITIONER

## 2023-06-02 PROCEDURE — 71046 X-RAY EXAM CHEST 2 VIEWS: CPT

## 2023-06-02 PROCEDURE — 99214 OFFICE O/P EST MOD 30 MIN: CPT | Performed by: NURSE PRACTITIONER

## 2023-06-02 PROCEDURE — 3078F DIAST BP <80 MM HG: CPT | Performed by: NURSE PRACTITIONER

## 2023-06-02 PROCEDURE — 3074F SYST BP LT 130 MM HG: CPT | Performed by: NURSE PRACTITIONER

## 2023-06-02 RX ORDER — BENZONATATE 100 MG/1
100-200 CAPSULE ORAL 3 TIMES DAILY PRN
Qty: 21 CAPSULE | Refills: 0 | Status: SHIPPED | OUTPATIENT
Start: 2023-06-02 | End: 2023-06-09

## 2023-06-02 RX ORDER — BUDESONIDE AND FORMOTEROL FUMARATE DIHYDRATE 80; 4.5 UG/1; UG/1
2 AEROSOL RESPIRATORY (INHALATION) 2 TIMES DAILY
Qty: 10.2 G | Refills: 1 | Status: SHIPPED | OUTPATIENT
Start: 2023-06-02

## 2023-06-02 ASSESSMENT — ENCOUNTER SYMPTOMS
BLOOD IN STOOL: 0
GASTROINTESTINAL NEGATIVE: 1
EYES NEGATIVE: 1
DIARRHEA: 0
EYE REDNESS: 0
NAUSEA: 0
EYE PAIN: 0
COUGH: 1
RHINORRHEA: 0
BACK PAIN: 0
SHORTNESS OF BREATH: 0
VOMITING: 0
ABDOMINAL PAIN: 0
CONSTIPATION: 0
SINUS PRESSURE: 0
SINUS PAIN: 0
CHEST TIGHTNESS: 0

## 2023-06-02 ASSESSMENT — PATIENT HEALTH QUESTIONNAIRE - PHQ9
SUM OF ALL RESPONSES TO PHQ QUESTIONS 1-9: 1
2. FEELING DOWN, DEPRESSED OR HOPELESS: 1
1. LITTLE INTEREST OR PLEASURE IN DOING THINGS: 0
SUM OF ALL RESPONSES TO PHQ9 QUESTIONS 1 & 2: 1

## 2023-06-02 NOTE — PROGRESS NOTES
Assessment and Plan     1. Chronic cough: Benign physical exam, pt speaking clearly and in full sentences. No signs of acute distress noted. Will start Symbicort daily as symptoms could be due to asthma. Advised to use rescue inhaler as needed. Imaging studies ordered. Return and strict ER instructions given. -     XR CHEST (2 VW); Future  -     budesonide-formoterol (SYMBICORT) 80-4.5 MCG/ACT AERO; Inhale 2 puffs into the lungs 2 times daily, Disp-10.2 g, R-1Normal  -     benzonatate (TESSALON) 100 MG capsule; Take 1-2 capsules by mouth 3 times daily as needed for Cough, Disp-21 capsule, R-0Normal  2. Fever, unspecified fever cause: Did not have fever during today's visit. Will check labs today. Hydration and tylenol prn recommended. -     CBC with Auto Differential; Future  -     Comprehensive Metabolic Panel; Future  3. Other fatigue: Lab work ordered. -     CBC with Auto Differential; Future  -     Comprehensive Metabolic Panel; Future  4. Essential hypertension, benign: At goal. Pt to continue with same treatment. Benefits, risks, possible drug interactions, and side effects of all new medications were reviewed with the patient. Pt verbalized understanding. An electronic signature was used to authenticate this note. OPAL Willis - CNP  6/2/2023    Follow-up and Dispositions    Return in about 4 weeks (around 6/30/2023). History of Present Illness   Chief Complaint     Nieves Allen is a 67 y.o. female here for had concerns including Cough (Cough; started mid-January; fatigue; low grade fever). Pt presented today with reports of low grade fever, fatigue and dry cough, onset 5 months ago. Pt has hx of asthma and seasonal allergies. Pt uses albuterol inhaler as needed with improvement of cough. Pt admits using rescue inhaler daily. Pt takes Allegra daily and nasal spray. Pt reports fevers are often in the evening, ranging 99.7-101F.  Pt saw a pulmonologist about 3 years

## 2023-06-03 LAB
ALBUMIN SERPL-MCNC: 4.3 G/DL (ref 3.5–5)
ALBUMIN/GLOB SERPL: 1.3 (ref 1.1–2.2)
ALP SERPL-CCNC: 54 U/L (ref 45–117)
ALT SERPL-CCNC: 26 U/L (ref 12–78)
ANION GAP SERPL CALC-SCNC: 7 MMOL/L (ref 5–15)
AST SERPL-CCNC: 10 U/L (ref 15–37)
BILIRUB SERPL-MCNC: 0.7 MG/DL (ref 0.2–1)
BUN SERPL-MCNC: 17 MG/DL (ref 6–20)
BUN/CREAT SERPL: 14 (ref 12–20)
CALCIUM SERPL-MCNC: 9.9 MG/DL (ref 8.5–10.1)
CHLORIDE SERPL-SCNC: 104 MMOL/L (ref 97–108)
CO2 SERPL-SCNC: 27 MMOL/L (ref 21–32)
CREAT SERPL-MCNC: 1.19 MG/DL (ref 0.55–1.02)
GLOBULIN SER CALC-MCNC: 3.2 G/DL (ref 2–4)
GLUCOSE SERPL-MCNC: 134 MG/DL (ref 65–100)
POTASSIUM SERPL-SCNC: 3.6 MMOL/L (ref 3.5–5.1)
PROT SERPL-MCNC: 7.5 G/DL (ref 6.4–8.2)
SODIUM SERPL-SCNC: 138 MMOL/L (ref 136–145)

## 2023-06-19 RX ORDER — OLMESARTAN MEDOXOMIL AND HYDROCHLOROTHIAZIDE 40/12.5 40; 12.5 MG/1; MG/1
1 TABLET ORAL DAILY
Qty: 90 TABLET | Refills: 0 | Status: SHIPPED | OUTPATIENT
Start: 2023-06-19

## 2023-07-08 DIAGNOSIS — E11.65 TYPE 2 DIABETES MELLITUS WITH HYPERGLYCEMIA, UNSPECIFIED WHETHER LONG TERM INSULIN USE (HCC): Primary | ICD-10-CM

## 2023-07-10 ENCOUNTER — TELEPHONE (OUTPATIENT)
Age: 73
End: 2023-07-10

## 2023-07-10 RX ORDER — METFORMIN HYDROCHLORIDE 500 MG/1
TABLET, EXTENDED RELEASE ORAL
Qty: 180 TABLET | Refills: 1 | Status: SHIPPED | OUTPATIENT
Start: 2023-07-10

## 2023-07-10 NOTE — TELEPHONE ENCOUNTER
called on behalf of Lois Slater, needs a medication refill for benzonatate (TESSALON) 100 MG capsule

## 2023-07-11 NOTE — TELEPHONE ENCOUNTER
Returned call to SenSergei. He states his wife is still dealing with post nasal drip, cough & elevated temps at night. States when she was seen a month ago for her symptoms the treatment offered did not help. Discussed she needs to be re-evaluated to determine the best treatment plan. Info to the [facility] urgent care provided. Mr. Naseem Haney was thankful.

## 2023-07-21 ENCOUNTER — OFFICE VISIT (OUTPATIENT)
Age: 73
End: 2023-07-21
Payer: MEDICARE

## 2023-07-21 VITALS
WEIGHT: 166.5 LBS | RESPIRATION RATE: 16 BRPM | OXYGEN SATURATION: 97 % | HEART RATE: 88 BPM | SYSTOLIC BLOOD PRESSURE: 136 MMHG | BODY MASS INDEX: 30.64 KG/M2 | HEIGHT: 62 IN | DIASTOLIC BLOOD PRESSURE: 84 MMHG | TEMPERATURE: 98.3 F

## 2023-07-21 DIAGNOSIS — R05.3 CHRONIC COUGH: ICD-10-CM

## 2023-07-21 DIAGNOSIS — M45.9 ANKYLOSING SPONDYLITIS, UNSPECIFIED SITE OF SPINE (HCC): ICD-10-CM

## 2023-07-21 DIAGNOSIS — R45.1 AGITATED: ICD-10-CM

## 2023-07-21 DIAGNOSIS — E78.5 DYSLIPIDEMIA, GOAL LDL BELOW 100: ICD-10-CM

## 2023-07-21 DIAGNOSIS — E11.65 TYPE 2 DIABETES MELLITUS WITH HYPERGLYCEMIA, WITHOUT LONG-TERM CURRENT USE OF INSULIN (HCC): ICD-10-CM

## 2023-07-21 DIAGNOSIS — Z00.00 MEDICARE ANNUAL WELLNESS VISIT, SUBSEQUENT: ICD-10-CM

## 2023-07-21 DIAGNOSIS — R41.3 MEMORY LOSS: ICD-10-CM

## 2023-07-21 DIAGNOSIS — I10 HTN, GOAL BELOW 130/80: Primary | ICD-10-CM

## 2023-07-21 DIAGNOSIS — B37.2 CANDIDAL INTERTRIGO: ICD-10-CM

## 2023-07-21 PROCEDURE — 3051F HG A1C>EQUAL 7.0%<8.0%: CPT | Performed by: INTERNAL MEDICINE

## 2023-07-21 PROCEDURE — G8399 PT W/DXA RESULTS DOCUMENT: HCPCS | Performed by: INTERNAL MEDICINE

## 2023-07-21 PROCEDURE — G8428 CUR MEDS NOT DOCUMENT: HCPCS | Performed by: INTERNAL MEDICINE

## 2023-07-21 PROCEDURE — G0439 PPPS, SUBSEQ VISIT: HCPCS | Performed by: INTERNAL MEDICINE

## 2023-07-21 PROCEDURE — 1123F ACP DISCUSS/DSCN MKR DOCD: CPT | Performed by: INTERNAL MEDICINE

## 2023-07-21 PROCEDURE — G8417 CALC BMI ABV UP PARAM F/U: HCPCS | Performed by: INTERNAL MEDICINE

## 2023-07-21 PROCEDURE — 99214 OFFICE O/P EST MOD 30 MIN: CPT | Performed by: INTERNAL MEDICINE

## 2023-07-21 PROCEDURE — 1036F TOBACCO NON-USER: CPT | Performed by: INTERNAL MEDICINE

## 2023-07-21 PROCEDURE — 2022F DILAT RTA XM EVC RTNOPTHY: CPT | Performed by: INTERNAL MEDICINE

## 2023-07-21 PROCEDURE — 3075F SYST BP GE 130 - 139MM HG: CPT | Performed by: INTERNAL MEDICINE

## 2023-07-21 PROCEDURE — 3079F DIAST BP 80-89 MM HG: CPT | Performed by: INTERNAL MEDICINE

## 2023-07-21 PROCEDURE — 1090F PRES/ABSN URINE INCON ASSESS: CPT | Performed by: INTERNAL MEDICINE

## 2023-07-21 PROCEDURE — 3017F COLORECTAL CA SCREEN DOC REV: CPT | Performed by: INTERNAL MEDICINE

## 2023-07-21 RX ORDER — NYSTATIN 100000 U/G
CREAM TOPICAL
Qty: 30 G | Refills: 4 | Status: SHIPPED | OUTPATIENT
Start: 2023-07-21

## 2023-07-21 RX ORDER — PREDNISONE 1 MG/1
1 TABLET ORAL DAILY
COMMUNITY

## 2023-07-21 RX ORDER — MEMANTINE HYDROCHLORIDE 5 MG/1
5 TABLET ORAL 2 TIMES DAILY
Qty: 180 TABLET | Refills: 1 | Status: SHIPPED | OUTPATIENT
Start: 2023-07-21

## 2023-07-21 RX ORDER — BENZONATATE 100 MG/1
100 CAPSULE ORAL 3 TIMES DAILY PRN
Qty: 30 CAPSULE | Refills: 3 | Status: SHIPPED | OUTPATIENT
Start: 2023-07-21 | End: 2023-11-18

## 2023-07-21 ASSESSMENT — PATIENT HEALTH QUESTIONNAIRE - PHQ9
SUM OF ALL RESPONSES TO PHQ QUESTIONS 1-9: 1
2. FEELING DOWN, DEPRESSED OR HOPELESS: 1
SUM OF ALL RESPONSES TO PHQ9 QUESTIONS 1 & 2: 1
SUM OF ALL RESPONSES TO PHQ QUESTIONS 1-9: 1
1. LITTLE INTEREST OR PLEASURE IN DOING THINGS: 0
SUM OF ALL RESPONSES TO PHQ QUESTIONS 1-9: 1
SUM OF ALL RESPONSES TO PHQ QUESTIONS 1-9: 1

## 2023-07-21 NOTE — PATIENT INSTRUCTIONS
Please make an appt with  [facility] first available for your memory loss with agitation       Substance Use Disorder: Care Instructions  Overview     You can improve your life and health by stopping your use of alcohol or drugs. When you don't drink or use drugs, you may feel and sleep better. You may get along better with your family, friends, and coworkers. There are medicines and programs that can help with substance use disorder. How can you care for yourself at home? If you have been given medicine to help keep you sober or reduce your cravings, be sure to take it exactly as prescribed. Talk to your doctor about programs that can help you stop using drugs or drinking alcohol. Do not tempt yourself by keeping alcohol or drugs in your home. Learn how to say no when other people drink or use drugs. Or don't spend time with people who drink or use drugs. Use the time and money spent on drinking or drugs to do something fun with your family or friends. Preventing a relapse  Do not drink alcohol or use drugs at all. Using any amount of alcohol or drugs greatly increases your risk for relapse. Seek help from organizations such as Alcoholics Anonymous, Narcotics Anonymous, or treatment facilities if you feel the need to drink alcohol or use drugs again. Remember that recovery is a lifelong process. Stay away from situations, friends, or places that may lead you to drink or use drugs. Have a plan to spot and deal with relapse. Learn to recognize changes in your thinking that lead you to drink or use drugs. These are warning signs. Get help before you start to drink or use drugs again. Get help as soon as you can if you relapse. Some people make a plan with another person that outlines what they want that person to do for them if they relapse. The plan usually includes how to handle the relapse and who to notify in case of relapse. Don't give up.  Remember that a relapse does not mean that you

## 2023-07-21 NOTE — PROGRESS NOTES
Lan Casillas (:  1950) is a 67 y.o. female,Established patient, here for evaluation of the following chief complaint(s):  Diabetes, Hypertension, Medication Check, and Medicare AWV         ASSESSMENT/PLAN:  1. HTN, goal below 130/80-controlled on med  2. Dyslipidemia, goal LDL below 100  3. Type 2 diabetes mellitus with hyperglycemia, without long-term current use of insulin ([address])  4. Ankylosing spondylitis, unspecified site of spine (HCC)-sees dr webb and now on prednisone may be adding to agitation but certainly has significant pain which this will hopefully help  5. Medicare annual wellness visit, subsequent  6. Agitated  -     memantine (NAMENDA) 5 MG tablet; Take 1 tablet by mouth 2 times daily, Disp-180 tablet, R-1Normal  7. Candidal intertrigo  -     nystatin (MYCOSTATIN) 251633 UNIT/GM cream; Apply topically 2 times daily. , Disp-30 g, R-4, Normal  8. Memory loss-progressive short term loss with now some hallucinations and inc agitation  Check mri  Start namenda  Advised neuro appt asap  See me in 1 month  -     MRI BRAIN W WO CONTRAST; Future  -     memantine (NAMENDA) 5 MG tablet; Take 1 tablet by mouth 2 times daily, Disp-180 tablet, R-1Normal  9. Chronic cough  -     benzonatate (TESSALON) 100 MG capsule; Take 1 capsule by mouth 3 times daily as needed for Cough, Disp-30 capsule, R-3Normal      No follow-ups on file.          Subjective   SUBJECTIVE/OBJECTIVE:  Diabetes    Hypertension    Today for follow-up accompanied by her  Alexys Cornejo who helps with the history they both report that her short-term memory has been an issue and has worsened over the last 6 months also notes that her mood has been more labile and that she is recently had some visual hallucinations in the house seeing a dog or cat that was not there she has not recently seen neurology has been working with Dr. Prince Ty Webb on chronic pain from ankylosing spondylitis and currently on low-dose prednisone for this it is unclear

## 2023-07-30 RX ORDER — AMLODIPINE BESYLATE 5 MG/1
5 TABLET ORAL DAILY
Qty: 90 TABLET | Refills: 1 | Status: SHIPPED | OUTPATIENT
Start: 2023-07-30

## 2023-08-05 ENCOUNTER — HOSPITAL ENCOUNTER (OUTPATIENT)
Facility: HOSPITAL | Age: 73
End: 2023-08-05
Attending: INTERNAL MEDICINE
Payer: MEDICARE

## 2023-08-05 DIAGNOSIS — R41.3 MEMORY LOSS: ICD-10-CM

## 2023-08-05 PROCEDURE — 70551 MRI BRAIN STEM W/O DYE: CPT

## 2023-08-11 LAB — HBA1C MFR BLD HPLC: 9.4 %

## 2023-08-21 ENCOUNTER — OFFICE VISIT (OUTPATIENT)
Age: 73
End: 2023-08-21
Payer: MEDICARE

## 2023-08-21 VITALS
DIASTOLIC BLOOD PRESSURE: 80 MMHG | SYSTOLIC BLOOD PRESSURE: 115 MMHG | WEIGHT: 167.8 LBS | HEIGHT: 62 IN | BODY MASS INDEX: 30.88 KG/M2 | HEART RATE: 97 BPM | OXYGEN SATURATION: 98 % | TEMPERATURE: 98.1 F | RESPIRATION RATE: 16 BRPM

## 2023-08-21 DIAGNOSIS — E11.65 TYPE 2 DIABETES MELLITUS WITH HYPERGLYCEMIA, WITHOUT LONG-TERM CURRENT USE OF INSULIN (HCC): ICD-10-CM

## 2023-08-21 DIAGNOSIS — R41.3 MEMORY LOSS: ICD-10-CM

## 2023-08-21 DIAGNOSIS — G31.9 CEREBRAL ATROPHY (HCC): Primary | ICD-10-CM

## 2023-08-21 DIAGNOSIS — C43.59 MALIGNANT MELANOMA OF TORSO EXCLUDING BREAST (HCC): ICD-10-CM

## 2023-08-21 DIAGNOSIS — R45.1 AGITATION: ICD-10-CM

## 2023-08-21 PROCEDURE — 3079F DIAST BP 80-89 MM HG: CPT | Performed by: INTERNAL MEDICINE

## 2023-08-21 PROCEDURE — 99214 OFFICE O/P EST MOD 30 MIN: CPT | Performed by: INTERNAL MEDICINE

## 2023-08-21 PROCEDURE — 2022F DILAT RTA XM EVC RTNOPTHY: CPT | Performed by: INTERNAL MEDICINE

## 2023-08-21 PROCEDURE — 3051F HG A1C>EQUAL 7.0%<8.0%: CPT | Performed by: INTERNAL MEDICINE

## 2023-08-21 PROCEDURE — 1090F PRES/ABSN URINE INCON ASSESS: CPT | Performed by: INTERNAL MEDICINE

## 2023-08-21 PROCEDURE — G8427 DOCREV CUR MEDS BY ELIG CLIN: HCPCS | Performed by: INTERNAL MEDICINE

## 2023-08-21 PROCEDURE — G8399 PT W/DXA RESULTS DOCUMENT: HCPCS | Performed by: INTERNAL MEDICINE

## 2023-08-21 PROCEDURE — G8417 CALC BMI ABV UP PARAM F/U: HCPCS | Performed by: INTERNAL MEDICINE

## 2023-08-21 PROCEDURE — 3074F SYST BP LT 130 MM HG: CPT | Performed by: INTERNAL MEDICINE

## 2023-08-21 PROCEDURE — 1036F TOBACCO NON-USER: CPT | Performed by: INTERNAL MEDICINE

## 2023-08-21 PROCEDURE — 1123F ACP DISCUSS/DSCN MKR DOCD: CPT | Performed by: INTERNAL MEDICINE

## 2023-08-21 PROCEDURE — 3017F COLORECTAL CA SCREEN DOC REV: CPT | Performed by: INTERNAL MEDICINE

## 2023-08-21 RX ORDER — MEMANTINE HYDROCHLORIDE 10 MG/1
10 TABLET ORAL 2 TIMES DAILY
Qty: 60 TABLET | Refills: 5 | Status: SHIPPED | OUTPATIENT
Start: 2023-08-21 | End: 2023-08-23 | Stop reason: SDUPTHER

## 2023-08-23 ENCOUNTER — TELEPHONE (OUTPATIENT)
Age: 73
End: 2023-08-23

## 2023-08-23 DIAGNOSIS — R45.1 AGITATION: ICD-10-CM

## 2023-08-23 DIAGNOSIS — E11.65 TYPE 2 DIABETES MELLITUS WITH HYPERGLYCEMIA, WITHOUT LONG-TERM CURRENT USE OF INSULIN (HCC): ICD-10-CM

## 2023-08-23 RX ORDER — MEMANTINE HYDROCHLORIDE 10 MG/1
10 TABLET ORAL 2 TIMES DAILY
Qty: 180 TABLET | Refills: 1 | Status: SHIPPED | OUTPATIENT
Start: 2023-08-23

## 2023-08-23 NOTE — TELEPHONE ENCOUNTER
[facility] sent to correct [facility]. The [facility] in [facility] has been deleted from the patient's profile.

## 2023-08-23 NOTE — TELEPHONE ENCOUNTER
----- Message from Miranda Barbara sent at 8/23/2023  2:36 PM EDT -----  Subject: Message to Provider    QUESTIONS  Information for Provider? Pt  stated med keeps going to wrong   pharmacy. Please send to [facility] Phone number   [phone]. Pt stated it keeps going to wrong pharmacy and they have   updated before. Please advise.   ---------------------------------------------------------------------------  --------------  Homero Kennedy TG  [phone]; OK to leave message on voicemail  ---------------------------------------------------------------------------  --------------  SCRIPT ANSWERS  Relationship to Patient? Spouse/Partner  Representative Name? Maya Nino   Is the representative on the Communication Release of Information (JESS)   form in Epic?  Yes

## 2023-09-13 DIAGNOSIS — I10 HTN, GOAL BELOW 130/80: Primary | ICD-10-CM

## 2023-09-13 RX ORDER — OLMESARTAN MEDOXOMIL AND HYDROCHLOROTHIAZIDE 40/12.5 40; 12.5 MG/1; MG/1
1 TABLET ORAL DAILY
Qty: 90 TABLET | Refills: 1 | Status: SHIPPED | OUTPATIENT
Start: 2023-09-13

## 2023-10-19 ENCOUNTER — TELEPHONE (OUTPATIENT)
Age: 73
End: 2023-10-19

## 2023-10-19 NOTE — TELEPHONE ENCOUNTER
Otf Spivey the patient  is requesting for a call back he stated he will like to Expediting the patient referral. (Patient health is fading he is very concern)  [phone]

## 2023-10-25 ENCOUNTER — TELEPHONE (OUTPATIENT)
Age: 73
End: 2023-10-25

## 2023-10-25 NOTE — TELEPHONE ENCOUNTER
Returned call to patient's . He is very concerned because since his wife was diagnosed with cerebral atrophy she has been slowly deteriorating. He states he feels helpless at this point. The patient is scheduled to see neuro in Nov but he feels this is too far out even after waiting for 2 months. Discussed the condition his wife has is chronic. Discussed we understand his concerns but we have no control over the scheduling of neurology offices.  He will get a fax number to another neuro office for us to fax her records & see if they will be able to see his wife sooner than end of Nov. Advised ER eval if decline is rapid for possible inpatient neuro eval.

## 2023-10-25 NOTE — TELEPHONE ENCOUNTER
Patient's , Pat Menendez is calling requesting a call back from the nurse. Pat Menendez states he's been waiting for a call back since 10/19.  Please advise

## 2023-10-30 NOTE — TELEPHONE ENCOUNTER
The patient  is requesting a call back he would like for the nurse to Verified if the patient should be taking the medication or not. Patient  Lawanda Yuan) he is upset because the PSR did not send his message over on 10/27/23. HE stated he spoke with a man PSR) in our office. (It is two other medication he is inquiring about but does not have medication on the list).    SITagliptin (JANUVIA) 100 MG tablet      [phone]

## 2023-10-30 NOTE — TELEPHONE ENCOUNTER
Gerardo Rose helps the patient with her meds. He questions if she should be taking Januvia. [id number] [place] was added at her last visit but there is no note of Januvia actually being an active med. Patient is currently taking the [id number] [address] [place] & metformin. Should she also be taking Januvia? Please advise.

## 2023-11-04 NOTE — ED NOTES
Dr. Jolie Alejo at bedside. No palpable pulse. Time of Death 0 by Dr. Jolie Alejo.       Rocio Sandoval RN  11/04/23 [id number]

## 2023-11-04 NOTE — ED NOTES
Spoke to [employer] at [place], pt released for eye and tissue donation.       Homero Foreman RN  11/04/23 [id number]

## 2023-11-04 NOTE — PROGRESS NOTES
Spiritual Care Assessment/Progress Note  Leah    Name: Lamont Stockton MRN: [id number]    Age: 67 y.o. Sex: female   Language: English     Date: 2023            Total Time Calculated: 36 min              Spiritual Assessment begun in \[facility]\"" EMERGENCY DEPT  Service Provided For[de-identified] Family  Referral/Consult From[de-identified] Nurse  Encounter Overview/Reason : Grief, Loss, and Adjustments    Spiritual beliefs:      [] Involved in a kati tradition/spiritual practice:      [] Supported by a kati community:      [] Claims no spiritual orientation:      [] Seeking spiritual identity:           [x] Adheres to an individual form of spirituality:      [] Not able to assess:                Identified resources for coping and support system:   Support System: Spouse       [] Prayer                  [] Devotional reading               [] Music                  [] Guided Imagery     [] Pet visits                                        [] Other: (COMMENT)     Specific area/focus of visit   Encounter:    Crisis:    Spiritual/Emotional needs:    Ritual, Rites and Sacraments:    Grief, Loss, and Adjustments: Type: Death, Grief and loss  Ethics/Mediation:    Behavioral Health:    Palliative Care: Advance Care Planning:      Plan/Referrals: Other (Comment) (patient is )    Narrative:   Responded to page to Emergency Room when patient . Patient's  was at bedside. Introduced self and role. He shared that he and his wife were not particularly Druze and he didn't think a prayer would offer much solace at this time. Reframed 's role as one of presence and support; he expressed appreciation for presence. Offered supportive listening as he spoke about her medical conditions that caused suffering in recent years. They have three children; a son in [facility], a son in [place], and a daughter in [place]. Stepped out of room to offer privacy as he said his good-bye to his wife of 46 years.

## 2023-11-04 NOTE — ED PROVIDER NOTES
[facility] EMERGENC[facility]PT  EMERGENCY DEPARTMENT ENCOUNTER       Pt Name: Angelo Ruvalcaba  MRN: [id number]  [address] [facility], [place], operated by [facility] 1950  Date of evaluation: 11/4/2023  Provider: Heather Ambriz DO   PCP: Dulce Walker MD  Note Started: 9:52 AM EDT 11/4/23     CHIEF COMPLAINT       Chief Complaint   Patient presents with    Cardiac Arrest     Pt brought in by EMS. Pt using bathroom this morning when she went unresponsive. At 2737 EMS arrived on seen to find pt in sinus samara PEA, no palpable pulse. CPR initiated        HISTORY OF PRESENT ILLNESS: 1 or more elements      History From: EMS/, History limited by: Cardiac arrest     Angelo Ruvalcaba is a 67 y.o. female arrives by EMS status postcardiac arrest.  Patient had been at home with her .  stated he had talked with her 10 minutes prior to her collapsing toilet. At the time EMS arrived they found the patient to be in a PEA rhythm bradycardic. CPR was started. Patient had return of spontaneous circulation following CPR and epinephrine. Per the  patient had not had any recent complaints. He states that she had been recently diagnosed with Alzheimer's dementia however they had not seen neurology yet. He states over the past day she had seemed to be more lethargic than normal.  She had not been complaining of any pain or discomfort this morning. Please See [facility] for Additional Details of the HPI/PMH  Nursing Notes were all reviewed and agreed with or any disagreements were addressed in the HPI. REVIEW OF SYSTEMS        Positives and Pertinent negatives as per HPI.     PAST HISTORY     Past Medical History:  Past Medical History:   Diagnosis Date    Ankylosing spondylitis ([address]) 2/8/2012    Asthma     Autoimmune disease ([address])     ankylosing spondylitis    Chronic pain     spondylitis    Diabetes ([address])     Essential hypertension, benign     First degree atrioventricular block     GERD (gastroesophageal reflux disease)     Hx of colonoscopy DO        EMERGENCY DEPARTMENT COURSE and DIFFERENTIAL DIAGNOSIS/MDM   Vitals:    Vitals:    11/04/23 0922 11/04/23 0925 11/04/23 0930 11/04/23 0936   BP: (!) 64/26 (!) 79/68 (!) 91/16 (!) 91/49   Pulse: 53 (!) 43 58 64   Resp: 12 (!) 8 14 13        Patient was given the following medications:  Medications   sodium bicarbonate 8.4 % injection (50 mEq IntraVENous Given 11/4/23 0919)   EPINEPHrine 1 MG/10ML injection (1 mg IntraVENous Given 11/4/23 0939)   atropine injection (1 mg IntraVENous Given 11/4/23 0925)   phenylephrine (FELICITA-SYNEPHRINE) injection (6 mg  Given 11/4/23 8325)       Medical Decision Making  Amount and/or Complexity of Data Reviewed  ECG/medicine tests: ordered. Risk  Prescription drug management. Patient arrives at 9:08 AM.  Patient had collapsed toilet. When EMS arrived patient was in PEA arrest with bradycardic rhythm. EMS and started CPR. They did not attained an IO. Patient had had BLS airway only. Has patient was moved off EMS stretcher pulse check demonstrated no pulse CPR started. Procedure Note - Orotracheal Intubation:   9727  Performed by: Terri Wu   Indication for procedure: respiratory failure and airway compromise  RSI performed. The patient was not sedated/ no paralytics and orotracheally intubated with a 7.5 Icelandic endotracheal tube using a Mac 4 blade with direct visualization. Tube was advanced to 22 cm at the teeth. ETT location confirmed by ETCO2 detector, breath sounds lungs, no sounds over the epigastrum. Number of attempts: 1  Complications: none  none  The procedure took  1  minutes, and pt tolerated well. Terri Wu,      Please see code note. Patient arrested multiple times during her course in the emergency department. Bedside echo shows a EF less than 5% with pulse. However patient would continue to become bradycardic and lose her pulse again.     EKG obtained at 9:12 AM interpreted by me idioventricular rhythm with PVCs rate of 30,

## 2023-11-08 LAB
EKG ATRIAL RATE: 52 BPM
EKG DIAGNOSIS: NORMAL
EKG Q-T INTERVAL: 412 MS
EKG QRS DURATION: 132 MS
EKG QTC CALCULATION (BAZETT): 291 MS
EKG R AXIS: -55 DEGREES
EKG T AXIS: -48 DEGREES
EKG VENTRICULAR RATE: 30 BPM

## 2023-11-13 ENCOUNTER — TELEPHONE (OUTPATIENT)
Age: 73
End: 2023-11-13

## 2023-11-13 NOTE — TELEPHONE ENCOUNTER
----- Message from Jerome Gonzalez sent at 11/13/2023  3:48 PM EST -----  Subject: Message to Provider    QUESTIONS  Information for Provider? Lawanda Bolden called to again inform us that Kory Arriola had   passed away on 11/11/2023; stated that he would welcome a call from Dr. Monika Boswell  ---------------------------------------------------------------------------  --------------  [address] [place] [address] Sal  [phone]; OK to leave message on voicemail  ---------------------------------------------------------------------------  --------------  SCRIPT ANSWERS  Relationship to Patient? Spouse/Partner  Representative Name? Lawanda Bolden  Is the representative on the Communication Release of Information (JESS)   form in Epic?  Yes

## 2024-04-03 RX ORDER — ATORVASTATIN CALCIUM 20 MG/1
TABLET, FILM COATED ORAL
Qty: 90 TABLET | OUTPATIENT
Start: 2024-04-03

## (undated) LAB
ALBUMIN SERPL-MCNC: 3.8 G/DL (ref 3.5–5)
ALBUMIN/GLOB SERPL: 1.3 (ref 1.1–2.2)
ALP SERPL-CCNC: 64 U/L (ref 45–117)
ALT SERPL-CCNC: 19 U/L (ref 12–78)
ANION GAP SERPL CALC-SCNC: 4 MMOL/L (ref 5–15)
AST SERPL-CCNC: 14 U/L (ref 15–37)
BILIRUB SERPL-MCNC: 0.5 MG/DL (ref 0.2–1)
BUN SERPL-MCNC: 9 MG/DL (ref 6–20)
BUN/CREAT SERPL: 12 (ref 12–20)
CALCIUM SERPL-MCNC: 9.2 MG/DL (ref 8.5–10.1)
CHLORIDE SERPL-SCNC: 106 MMOL/L (ref 97–108)
CO2 SERPL-SCNC: 26 MMOL/L (ref 21–32)
CREAT SERPL-MCNC: 0.76 MG/DL (ref 0.55–1.02)
GLOBULIN SER CALC-MCNC: 3 G/DL (ref 2–4)
GLUCOSE SERPL-MCNC: 82 MG/DL (ref 65–100)
POTASSIUM SERPL-SCNC: 4.8 MMOL/L (ref 3.5–5.1)
PROT SERPL-MCNC: 6.8 G/DL (ref 6.4–8.2)
SODIUM SERPL-SCNC: 136 MMOL/L (ref 136–145)

## (undated) DEVICE — BAG BELONG PT PERS CLEAR HANDL

## (undated) DEVICE — SET ADMIN 16ML TBNG L100IN 2 Y INJ SITE IV PIGGY BK DISP

## (undated) DEVICE — STERILE POLYISOPRENE POWDER-FREE SURGICAL GLOVES WITH EMOLLIENT COATING: Brand: PROTEXIS

## (undated) DEVICE — X-RAY SPONGES,16 PLY: Brand: DERMACEA

## (undated) DEVICE — SUTURE STRATAFIX SPRL SZ 1 L14IN ABSRB VLT L48CM CTX 1/2 SXPD2B405

## (undated) DEVICE — STERILE POLYISOPRENE POWDER-FREE SURGICAL GLOVES: Brand: PROTEXIS

## (undated) DEVICE — SUTURE MCRYL SZ 3-0 L27IN ABSRB UD L24MM PS-1 3/8 CIR PRIM Y936H

## (undated) DEVICE — ZIMMER® STERILE DISPOSABLE TOURNIQUET CUFF WITH PLC, DUAL PORT, SINGLE BLADDER, 34 IN. (86 CM)

## (undated) DEVICE — Device

## (undated) DEVICE — SOLUTION IV 50ML 0.9% SOD CHL

## (undated) DEVICE — 1200 GUARD II KIT W/5MM TUBE W/O VAC TUBE: Brand: GUARDIAN

## (undated) DEVICE — SUTURE VCRL 1 L27IN ABSRB CT BRAID COAT UD J281H

## (undated) DEVICE — CONNECTOR TBNG AUX H2O JET DISP FOR OLY 160/180 SER

## (undated) DEVICE — CATH IV AUTOGRD BC BLU 22GA 25 -- INSYTE

## (undated) DEVICE — CONTAINER SPEC 20 ML LID NEUT BUFF FORMALIN 10 % POLYPR STS

## (undated) DEVICE — DRAPE,EXTREMITY,89X128,STERILE: Brand: MEDLINE

## (undated) DEVICE — Z CONVERTED USE 2271043 CONTAINER SPEC COLL 4OZ SCR ON LID PEEL PCH

## (undated) DEVICE — Z DISCONTINUED USE 2751540 TUBING IRRIG L10IN DISP PMP ENDOGATOR

## (undated) DEVICE — NEEDLE HYPO 18GA L1.5IN PNK S STL HUB POLYPR SHLD REG BVL

## (undated) DEVICE — HANDPIECE SET WITH BONE CLEANING TIP AND SUCTION TUBE: Brand: INTERPULSE

## (undated) DEVICE — (D)SYR 10ML 1/5ML GRAD NSAF -- PKGING CHANGE USE ITEM 338027

## (undated) DEVICE — PREP SKN PREVAIL 40ML APPL --

## (undated) DEVICE — SUTURE VCRL SZ 2-0 L36IN ABSRB UD L40MM CT 1/2 CIR J957H

## (undated) DEVICE — QUILTED PREMIUM COMFORT UNDERPAD,EXTRA HEAVY: Brand: WINGS

## (undated) DEVICE — KENDALL RADIOLUCENT FOAM MONITORING ELECTRODE -RECTANGULAR SHAPE: Brand: KENDALL

## (undated) DEVICE — BW-412T DISP COMBO CLEANING BRUSH: Brand: SINGLE USE COMBINATION CLEANING BRUSH

## (undated) DEVICE — BAG SPEC BIOHZD LF 2MIL 6X10IN -- CONVERT TO ITEM 357326

## (undated) DEVICE — ENDO CARRY-ON PROCEDURE KIT INCLUDES ENZYMATIC SPONGE, GAUZE, BIOHAZARD LABEL, TRAY, LUBRICANT, DIRTY SCOPE LABEL, WATER LABEL, TRAY, DRAWSTRING PAD, AND DEFENDO 4-PIECE KIT.: Brand: ENDO CARRY-ON PROCEDURE KIT

## (undated) DEVICE — SOLUTION IRRIG 1000ML H2O STRL BLT

## (undated) DEVICE — LIGHT HANDLE: Brand: DEVON

## (undated) DEVICE — (D)PREP SKN CHLRAPRP APPL 26ML -- CONVERT TO ITEM 371833

## (undated) DEVICE — UNDERPAD XTR STRGHT 30X36IN --

## (undated) DEVICE — STRYKER PERFORMANCE SERIES SAGITTAL BLADE: Brand: STRYKER PERFORMANCE SERIES

## (undated) DEVICE — T5 HOOD WITH PEEL AWAY FACE SHIELD

## (undated) DEVICE — DERMABOND SKIN ADH 0.7ML -- DERMABOND ADVANCED 12/BX

## (undated) DEVICE — Z DISCONTINUED USE 2744636  DRESSING AQUACEL 14 IN ALG W3.5XL14IN POLYUR FLM CVR W/ HYDRCOLL

## (undated) DEVICE — SYRINGE MED 20ML STD CLR PLAS LUERLOCK TIP N CTRL DISP

## (undated) DEVICE — SOLIDIFIER FLUID 3000 CC ABSORB

## (undated) DEVICE — FORCEPS BX L240CM JAW DIA2.8MM L CAP W/ NDL MIC MESH TOOTH

## (undated) DEVICE — GAUZE SPONGES,12 PLY: Brand: CURITY

## (undated) DEVICE — REM POLYHESIVE ADULT PATIENT RETURN ELECTRODE: Brand: VALLEYLAB

## (undated) DEVICE — HOOK LOCK LATEX FREE ELASTIC BANDAGE D/L 6INX10YD

## (undated) DEVICE — 3M™ IOBAN™ 2 ANTIMICROBIAL INCISE DRAPE 6651EZ: Brand: IOBAN™ 2

## (undated) DEVICE — CUSTOM CAST PD STR

## (undated) DEVICE — INFECTION CONTROL KIT SYS

## (undated) DEVICE — GOWN,PREVENTION PLUS,XLN/2XL,ST,22/CS: Brand: MEDLINE

## (undated) DEVICE — SOLUTION IRRIG 3000ML 0.9% SOD CHL FLX CONT 0797208] ICU MEDICAL INC]

## (undated) DEVICE — Z DISCONTINUED NO SUB IDED SET EXTN W/ 4 W STPCOCK M SPIN LOK 36IN

## (undated) DEVICE — CARTRIDGE BNE CEM MIX UNIV TWR VAC ROTOR BRK OFF NOZ W/O

## (undated) DEVICE — Device: Brand: MEDICAL ACTION INDUSTRIES

## (undated) DEVICE — DEVON™ KNEE AND BODY STRAP 60" X 3" (1.5 M X 7.6 CM): Brand: DEVON

## (undated) DEVICE — AIRLIFE™ U/CONNECT-IT OXYGEN TUBING 7 FEET (2.1 M) CRUSH-RESISTANT OXYGEN TUBING, VINYL TIPPED: Brand: AIRLIFE™

## (undated) DEVICE — TRAY CATH W/ 16FR CATH URIN M CTRL FIT OUTLT TB F STATLOK

## (undated) DEVICE — 4-PORT MANIFOLD: Brand: NEPTUNE 2

## (undated) DEVICE — SCRUB DRY SURG EZ SCRUB BRUSH PREOPERATIVE GRN